# Patient Record
Sex: MALE | Race: WHITE | NOT HISPANIC OR LATINO
[De-identification: names, ages, dates, MRNs, and addresses within clinical notes are randomized per-mention and may not be internally consistent; named-entity substitution may affect disease eponyms.]

---

## 2017-07-27 ENCOUNTER — APPOINTMENT (OUTPATIENT)
Dept: GASTROENTEROLOGY | Facility: CLINIC | Age: 82
End: 2017-07-27
Payer: COMMERCIAL

## 2017-07-27 VITALS
BODY MASS INDEX: 34.53 KG/M2 | TEMPERATURE: 97.7 F | DIASTOLIC BLOOD PRESSURE: 70 MMHG | WEIGHT: 220 LBS | HEIGHT: 67 IN | SYSTOLIC BLOOD PRESSURE: 120 MMHG

## 2017-07-27 DIAGNOSIS — Z86.79 PERSONAL HISTORY OF OTHER DISEASES OF THE CIRCULATORY SYSTEM: ICD-10-CM

## 2017-07-27 DIAGNOSIS — Z23 ENCOUNTER FOR IMMUNIZATION: ICD-10-CM

## 2017-07-27 DIAGNOSIS — F15.90 OTHER STIMULANT USE, UNSPECIFIED, UNCOMPLICATED: ICD-10-CM

## 2017-07-27 PROCEDURE — 99204 OFFICE O/P NEW MOD 45 MIN: CPT

## 2017-08-08 ENCOUNTER — APPOINTMENT (OUTPATIENT)
Dept: GASTROENTEROLOGY | Facility: HOSPITAL | Age: 82
End: 2017-08-08
Payer: COMMERCIAL

## 2017-08-08 ENCOUNTER — RESULT REVIEW (OUTPATIENT)
Age: 82
End: 2017-08-08

## 2017-08-08 ENCOUNTER — OUTPATIENT (OUTPATIENT)
Dept: OUTPATIENT SERVICES | Facility: HOSPITAL | Age: 82
LOS: 1 days | Discharge: HOME | End: 2017-08-08
Payer: COMMERCIAL

## 2017-08-08 VITALS
TEMPERATURE: 97 F | SYSTOLIC BLOOD PRESSURE: 189 MMHG | DIASTOLIC BLOOD PRESSURE: 84 MMHG | RESPIRATION RATE: 17 BRPM | OXYGEN SATURATION: 97 % | HEART RATE: 77 BPM | HEIGHT: 67.5 IN | WEIGHT: 220.02 LBS

## 2017-08-08 VITALS
SYSTOLIC BLOOD PRESSURE: 152 MMHG | DIASTOLIC BLOOD PRESSURE: 76 MMHG | OXYGEN SATURATION: 100 % | HEART RATE: 62 BPM | RESPIRATION RATE: 15 BRPM

## 2017-08-08 DIAGNOSIS — I71.4 ABDOMINAL AORTIC ANEURYSM, WITHOUT RUPTURE: Chronic | ICD-10-CM

## 2017-08-08 DIAGNOSIS — Z90.79 ACQUIRED ABSENCE OF OTHER GENITAL ORGAN(S): Chronic | ICD-10-CM

## 2017-08-08 PROCEDURE — 88305 TISSUE EXAM BY PATHOLOGIST: CPT | Mod: 26

## 2017-08-08 PROCEDURE — 88312 SPECIAL STAINS GROUP 1: CPT | Mod: 26

## 2017-08-08 PROCEDURE — G0121 COLON CA SCRN NOT HI RSK IND: CPT | Mod: 53

## 2017-08-08 PROCEDURE — 43239 EGD BIOPSY SINGLE/MULTIPLE: CPT | Mod: 59

## 2017-08-08 PROCEDURE — G0105: CPT | Mod: 53

## 2017-08-08 RX ORDER — SODIUM CHLORIDE 9 MG/ML
1000 INJECTION, SOLUTION INTRAVENOUS
Qty: 0 | Refills: 0 | Status: DISCONTINUED | OUTPATIENT
Start: 2017-08-08 | End: 2017-08-08

## 2017-08-08 RX ADMIN — SODIUM CHLORIDE 75 MILLILITER(S): 9 INJECTION, SOLUTION INTRAVENOUS at 15:45

## 2017-08-08 NOTE — ASU DISCHARGE PLAN (ADULT/PEDIATRIC). - NOTIFY
Persistent Nausea and Vomiting/Inability to Tolerate Liquids or Foods/Fever greater than 101/Bleeding that does not stop

## 2017-08-10 LAB — SURGICAL PATHOLOGY FINAL REPORT - CH: SIGNIFICANT CHANGE UP

## 2017-08-11 DIAGNOSIS — K29.70 GASTRITIS, UNSPECIFIED, WITHOUT BLEEDING: ICD-10-CM

## 2017-08-11 DIAGNOSIS — R63.4 ABNORMAL WEIGHT LOSS: ICD-10-CM

## 2017-08-11 DIAGNOSIS — K29.80 DUODENITIS WITHOUT BLEEDING: ICD-10-CM

## 2017-08-30 ENCOUNTER — APPOINTMENT (OUTPATIENT)
Dept: GASTROENTEROLOGY | Facility: CLINIC | Age: 82
End: 2017-08-30
Payer: COMMERCIAL

## 2017-08-30 VITALS
SYSTOLIC BLOOD PRESSURE: 160 MMHG | HEIGHT: 67 IN | BODY MASS INDEX: 33.43 KG/M2 | DIASTOLIC BLOOD PRESSURE: 85 MMHG | TEMPERATURE: 97.5 F | WEIGHT: 213 LBS

## 2017-08-30 PROCEDURE — 99214 OFFICE O/P EST MOD 30 MIN: CPT

## 2017-11-06 ENCOUNTER — APPOINTMENT (OUTPATIENT)
Dept: GASTROENTEROLOGY | Facility: AMBULATORY MEDICAL SERVICES | Age: 82
End: 2017-11-06

## 2018-01-27 ENCOUNTER — INPATIENT (INPATIENT)
Facility: HOSPITAL | Age: 83
LOS: 11 days | Discharge: NURSING, REHAB OR LONG-TERM CARE | DRG: 235 | End: 2018-02-08
Attending: THORACIC SURGERY (CARDIOTHORACIC VASCULAR SURGERY) | Admitting: THORACIC SURGERY (CARDIOTHORACIC VASCULAR SURGERY)
Payer: COMMERCIAL

## 2018-01-27 VITALS — WEIGHT: 192.24 LBS | TEMPERATURE: 99 F

## 2018-01-27 DIAGNOSIS — Z98.890 OTHER SPECIFIED POSTPROCEDURAL STATES: Chronic | ICD-10-CM

## 2018-01-27 DIAGNOSIS — Z98.49 CATARACT EXTRACTION STATUS, UNSPECIFIED EYE: Chronic | ICD-10-CM

## 2018-01-27 DIAGNOSIS — Z90.79 ACQUIRED ABSENCE OF OTHER GENITAL ORGAN(S): Chronic | ICD-10-CM

## 2018-01-27 DIAGNOSIS — I71.4 ABDOMINAL AORTIC ANEURYSM, WITHOUT RUPTURE: Chronic | ICD-10-CM

## 2018-01-27 LAB
GLUCOSE BLDC GLUCOMTR-MCNC: 98 MG/DL — SIGNIFICANT CHANGE UP (ref 70–99)
HCT VFR BLD CALC: 44.1 % — SIGNIFICANT CHANGE UP (ref 39–50)
HGB BLD-MCNC: 14.6 G/DL — SIGNIFICANT CHANGE UP (ref 13–17)
MCHC RBC-ENTMCNC: 29 PG — SIGNIFICANT CHANGE UP (ref 27–34)
MCHC RBC-ENTMCNC: 33.1 G/DL — SIGNIFICANT CHANGE UP (ref 32–36)
MCV RBC AUTO: 87.7 FL — SIGNIFICANT CHANGE UP (ref 80–100)
PLATELET # BLD AUTO: 139 K/UL — LOW (ref 150–400)
RBC # BLD: 5.03 M/UL — SIGNIFICANT CHANGE UP (ref 4.2–5.8)
RBC # FLD: 14.5 % — SIGNIFICANT CHANGE UP (ref 10.3–16.9)
WBC # BLD: 11 K/UL — HIGH (ref 3.8–10.5)
WBC # FLD AUTO: 11 K/UL — HIGH (ref 3.8–10.5)

## 2018-01-27 PROCEDURE — 71045 X-RAY EXAM CHEST 1 VIEW: CPT | Mod: 26

## 2018-01-27 PROCEDURE — 99291 CRITICAL CARE FIRST HOUR: CPT

## 2018-01-27 PROCEDURE — 99292 CRITICAL CARE ADDL 30 MIN: CPT

## 2018-01-27 NOTE — H&P ADULT - PMH
Benign prostate hyperplasia    Hypercholesterolemia    Hypertension Benign prostate hyperplasia    Hemorrhoids    Hypercholesterolemia    Hypertension

## 2018-01-27 NOTE — H&P ADULT - NSHPREVIEWOFSYSTEMS_GEN_ALL_CORE
Review of Systems  CONSTITUTIONAL:  Denies Fevers / chills, sweats, fatigue, weight loss, weight gain                                      NEURO:  Denies parathesias, seizures, syncope, confusion                                                                                EYES:  Denies Blurry vision, discharge, pain, loss of vision                                                                                    ENMT:  Denies Difficulty hearing, vertigo, dysphagia, epistaxis, recent dental work                                       CV:  Denies Chest pain, palpitations, CHIANG, orthopnea                                                                                          RESPIRATORY:  Denies Wheezing, SOB, cough / sputum, hemoptysis                                                                GI:  Denies Nausea, vomiting, diarrhea, constipation, melena, difficulty swallowing                                               : Denies Hematuria, dysuria, urgency, incontinence                                                                                         MUSKULOSKELETAL:  Denies arthritis, joint swelling, muscle weakness                                                             SKIN/BREAST:  Denies rash, itching, hair loss, masses                                                                                            PSYCH:  Denies depression, anxiety, suicidal ideation                                                                                               HEME/LYMPH:  Denies bruises easily, enlarged lymph nodes, tender lymph nodes                                        ENDOCRINE:  Denies cold intolerance, heat intolerance, polydipsia Review of Systems  CONSTITUTIONAL:  Denies Fevers / chills, sweats, fatigue, weight loss, weight gain                                      NEURO:  Denies parathesias, seizures, syncope, confusion                                                                                EYES:  Denies Blurry vision, discharge, pain, loss of vision                                                                                    ENMT:  Denies Difficulty hearing, vertigo, dysphagia, epistaxis, recent dental work                                       CV:  Denies Chest pain, palpitations, CHIANG, orthopnea                                                                                          RESPIRATORY:  Denies Wheezing, SOB, cough / sputum, hemoptysis                                                                GI:  Denies Nausea, vomiting, diarrhea, melena, difficulty swallowing                admit constipation,                                              : Denies Hematuria, dysuria, urgency, incontinence                                                                                         MUSKULOSKELETAL:  Denies arthritis, joint swelling, muscle weakness                                                             SKIN/BREAST:  Denies rash, itching, hair loss, masses                                                                                            PSYCH:  Denies depression, anxiety, suicidal ideation                                                                                               HEME/LYMPH:  Denies bruises easily, enlarged lymph nodes, tender lymph nodes                                        ENDOCRINE:  Denies cold intolerance, heat intolerance, polydipsia Review of Systems  CONSTITUTIONAL:  Denies Fevers / chills, sweats, fatigue, weight loss, weight gain                                      NEURO:  Denies parathesias, seizures, syncope, confusion                                                                                EYES:  Denies Blurry vision, discharge, pain, loss of vision                                                                                    ENMT:  Denies Difficulty hearing, vertigo, dysphagia, epistaxis, recent dental work                                       CV:  Denies Chest pain, palpitations, CHIANG, orthopnea                                                                                          RESPIRATORY:  Denies Wheezing, SOB, cough / sputum, hemoptysis                                                                GI:  Denies Nausea, vomiting, diarrhea, melena, difficulty swallowing                Admit constipation,                                              : Denies Hematuria, dysuria, urgency, incontinence                                                                                         MUSKULOSKELETAL:  Denies arthritis, joint swelling, muscle weakness                                                             SKIN/BREAST:  Denies rash, itching, hair loss, masses                                                                                            PSYCH:  Denies depression, anxiety, suicidal ideation                                                                                               HEME/LYMPH:  Denies bruises easily, enlarged lymph nodes, tender lymph nodes                                        ENDOCRINE:  Denies cold intolerance, heat intolerance, polydipsia Review of Systems  CONSTITUTIONAL:  Denies Fevers / chills, sweats, fatigue, weight loss, weight gain                                      NEURO:  Denies parathesias, seizures, syncope, confusion                                                                                EYES:  Denies Blurry vision, discharge, pain, loss of vision                                                                                    ENMT:  Denies Difficulty hearing, vertigo, dysphagia, epistaxis, recent dental work                                       CV:  Denies Chest pain, palpitations, CHIANG, orthopnea                                                                                          RESPIRATORY:  Denies Wheezing, SOB, cough / sputum, hemoptysis                                                                GI:  Denies Nausea, vomiting, diarrhea, melena, difficulty swallowing                Admit constipation, BRBPR possibly 2/2 hemorrhoids                                             : Denies Hematuria, dysuria, urgency, incontinence                                                                                         MUSKULOSKELETAL:  Denies arthritis, joint swelling, muscle weakness                                                             SKIN/BREAST:  Denies rash, itching, hair loss, masses                                                                                            PSYCH:  Denies depression, anxiety, suicidal ideation                                                                                               HEME/LYMPH:  Denies bruises easily, enlarged lymph nodes, tender lymph nodes                                        ENDOCRINE:  Denies cold intolerance, heat intolerance, polydipsia

## 2018-01-27 NOTE — H&P ADULT - NSHPSOCIALHISTORY_GEN_ALL_CORE
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

## 2018-01-27 NOTE — H&P ADULT - NSHPLABSRESULTS_GEN_ALL_CORE
Cardiac Cath done showed pLAD 80% stenosis, mCirc diffuse 80% stenosis, OM1 99% in the third proximal segment, pRCA 100% stenosis, EF 50% apical hypokinesis, apical septal hypokinesis.

## 2018-01-27 NOTE — PROGRESS NOTE ADULT - SUBJECTIVE AND OBJECTIVE BOX
CTICU  CRITICAL  CARE  attending     Hand off received 					   Pertinent clinical, laboratory, radiographic, hemodynamic, echocardiographic, respiratory data, microbiologic data and chart were reviewed and analyzed frequently throughout the course of the day and night  Patient seen and examined with CTS/ SH attending at bedside  Pt is a 85y , Male, HEALTH ISSUES - PROBLEM Dx:      , FAMILY HISTORY:  PAST MEDICAL & SURGICAL HISTORY:  Hypercholesterolemia  Benign prostate hyperplasia  Hypertension  H/O transurethral resection of prostate  AAA (abdominal aortic aneurysm)    Patient is a 85y old  Male who presents with a chief complaint of 3 vessel CAD (27 Jan 2018 23:05)      14 system review was unremarkable  acute changes include acute respiratory failure  Vital signs, hemodynamic and respiratory parameters were reviewed from the bedside nursing flowsheet.  ICU Vital Signs Last 24 Hrs  T(C): 37.3 (27 Jan 2018 22:35), Max: 37.3 (27 Jan 2018 22:35)  T(F): 99.2 (27 Jan 2018 22:35), Max: 99.2 (27 Jan 2018 22:35)  HR: --  BP: --  BP(mean): --  ABP: --  ABP(mean): --  RR: --  SpO2: --    Adult Advanced Hemodynamics Last 24 Hrs  CVP(mm Hg): --  CVP(cm H2O): --  CO: --  CI: --  PA: --  PA(mean): --  PCWP: --  SVR: --  SVRI: --  PVR: --  PVRI: --,     Intake and output was reviewed and the fluid balance was calculated  Daily     Daily   I&O's Summary      All lines and drain sites were assessed  [facility]      POCT Blood Glucose.: 98 mg/dL (27 Jan 2018 22:57)    No acute change in mental status  Auscultation of the chest reveals equal bs  Abdomen is soft  Extremities are warm and well perfused  Wounds appear clean and unremarkable  Antibiotics are periop    labs            The current medications were reviewed   MEDICATIONS  (STANDING):    MEDICATIONS  (PRN):       PROBLEM LIST/ ASSESSMENT:  HEALTH ISSUES - PROBLEM Dx:      ,   Patient is a 85y old  Male who presents with a chief complaint of 3 vessel CAD (27 Jan 2018 23:05)     s/p   acute changes include acute respiratory failure    My plan includes :  close hemodynamic, ventilatory and drain monitoring and management per post op routine    Monitor for arrhythmias and monitor parameters for organ perfusion  monitor neurologic status  Head of the bed should remain elevated to 45 deg .   chest PT and IS will be encouraged  monitor adequacy of oxygenation and ventilation and attempt to wean oxygen  Nutritional goals will be met using po eventually , ensure adequate caloric intake and montior the same  Stress ulcer and VTE prophylaxis will be achieved    Glycemic control is satisfactory  Electrolytes have been repleted as necessary and wound care has been carried out. Pain control has been achieved.   agressive physical therapy and early mobility and ambulation goals will be met   The family was updated about the course and plan  CRITICAL CARE TIME SPENT in evaluation and management, reassessments, review and interpretation of labs and x-rays, ventilator and hemodynamic management, formulating a plan and coordinating care: ___90____ MIN.  Time does not include procedural time.    CTICU ATTENDING     					    Enoch Sharif MD CTICU  CRITICAL  CARE  attending     Hand off received 					   Pertinent clinical, laboratory, radiographic, hemodynamic, echocardiographic, respiratory data, microbiologic data and chart were reviewed and analyzed frequently throughout the course of the day and night  Patient seen and examined with CTS/ SH attending at bedside  Pt is a 85y , Male, admitted with NSTEMI; triple vessel disease     a poor historian with hypertension, hypercholesterolemia, BPH, s/p AAA repair,  BIBA to [facility] after feeling lightheaded and fell at home on Friday night. Lab work revealed troponin 23, no acute changes on EKG seen, patient was subsequently loaded with aspirin and plavix. Cardiac Cath done showed pLAD 80% stenosis, mCirc diffuse 80% stenosis, OM1 99% in the third proximal segment, pRCA 100% stenosis, EF 50% apical hypokinesis, apical septal hypokinesis.   Patient denies, syncope, chest pain, SOB, Orthopnea, palpitation, nausea, vomiting.  Blood culture sent was positive for gram negative rods. Patient was given a dose of Zosyn 3.375mg today before his transfer to [facility] ICU on heparin infusion , for evaluation for CABG.            , FAMILY HISTORY:  PAST MEDICAL & SURGICAL HISTORY:  Hypercholesterolemia  Benign prostate hyperplasia  Hypertension  H/O transurethral resection of prostate  AAA (abdominal aortic aneurysm)    Patient is a 85y old  Male who presents with a chief complaint of 3 vessel CAD (27 Jan 2018 23:05)      14 system review was unremarkable  acute changes include acute respiratory failure  Vital signs, hemodynamic and respiratory parameters were reviewed from the bedside nursing flowsheet.  ICU Vital Signs Last 24 Hrs  T(C): 37.3 (27 Jan 2018 22:35), Max: 37.3 (27 Jan 2018 22:35)  T(F): 99.2 (27 Jan 2018 22:35), Max: 99.2 (27 Jan 2018 22:35)  HR: --  BP: --  BP(mean): --  ABP: --  ABP(mean): --  RR: --  SpO2: --    Adult Advanced Hemodynamics Last 24 Hrs  CVP(mm Hg): --  CVP(cm H2O): --  CO: --  CI: --  PA: --  PA(mean): --  PCWP: --  SVR: --  SVRI: --  PVR: --  PVRI: --,     Intake and output was reviewed and the fluid balance was calculated  Daily     Daily   I&O's Summary      All lines and drain sites were assessed  Glycemic trend was [facility] BLOOD GLUCOSE      POCT Blood Glucose.: 98 mg/dL (27 Jan 2018 22:57)    No acute change in mental status  Auscultation of the chest reveals equal bs  Abdomen is soft  Extremities are warm and well perfused  Wounds appear clean and unremarkable  Antibiotics are periop    labs            The current medications were reviewed   MEDICATIONS  (STANDING):    MEDICATIONS  (PRN):       PROBLEM LIST/ ASSESSMENT:  HEALTH ISSUES - PROBLEM Dx:    NSTEMI  Unstable angina  CAD      ,   Patient is a 85y old  Male who presents with a chief complaint of 3 vessel CAD /NSTEMI      My plan includes :  close hemodynamic, ventilatory and drain monitoring and management per post op routine  Monitor for arrhythmias and monitor parameters for organ perfusion  monitor neurologic status  Head of the bed should remain elevated to 45 deg .   chest PT and IS will be encouraged  monitor adequacy of oxygenation and ventilation and attempt to wean oxygen  Nutritional goals will be met using po eventually , ensure adequate caloric intake and montior the same  Stress ulcer and VTE prophylaxis will be achieved    Glycemic control is satisfactory  Electrolytes have been repleted as necessary and wound care has been carried out. Pain control has been achieved.   agressive physical therapy and early mobility and ambulation goals will be met   The family was updated about the course and plan  CRITICAL CARE TIME SPENT in evaluation and management, reassessments, review and interpretation of labs and x-rays, ventilator and hemodynamic management, formulating a plan and coordinating care: ___90____ MIN.  Time does not include procedural time.    CTICU ATTENDING     					    Enoch Sharif MD

## 2018-01-27 NOTE — H&P ADULT - ASSESSMENT
This is an 85 year old male, a poor historian with hypertension, hypercholesterolemia, BPH, s/p AAA repair,  BIBA to [facility] after feeling lightheaded and fell at home on Friday night. Lab work revealed troponin 23, no acute changes on EKG seen, patient was subsequently loaded with aspirin and plavix. Cardiac Cath done showed pLAD 80% stenosis, mCirc diffuse 80% stenosis, OM1 99% in the third proximal segment, pRCA 100% stenosis, EF 50% apical hypokinesis, apical septal hypokinesis.   Patient denies, syncope, chest pain, SOB, Orthopnea, palpitation, nausea, vomiting.  Blood culture sent was positive for gram negative rods. Patient was given a dose of Zosyn 3.375mg today before his transfer to [facility] ICU on heparin infusion under Dr. Gilman for evaluation for CABG.    Plan:    Neurovascular: No delirium. No chest pain    Cardiovascular: Hemodynamically stable. HR controlled.  -BP. HR. EKG. TTE. Cardiac Panel. Lipid Panel. BNP.   -ASA. BBlocker. Statin.    -D/c heparin gtt per Dr. Gilman    Respiratory: 02 Sat = 98% on RA.  -If on oxygen wean to RA from for O2 Sat > 93%.  -Encourage C+DB and Use of IS 10x / hr while awake.  -CXR.  -PFT    GI: Stable.  -PPX.  -PO Diet.    Renal / :  -Monitor renal function. CARLOS creatine increase 1.5 to 1.7  -Monitor I/O's.    Endocrine:    -A1c.  -TSH.    Hematologic:  -CBC.  -Coagulation Panel.    ID:  -Temperature.  -CBC.  -blood culture x2 sets  -f/u positive blood culture for sensitivities at [facility]  -continue Zosyn 3.375mg q6h    Prophylaxis:  -DVT prophylaxis with 5000 SubQ Heparin q8h.  -SCD's    Disposition:  -ICU for frequent monitoring.

## 2018-01-27 NOTE — H&P ADULT - NSHPPHYSICALEXAM_GEN_ALL_CORE
Physical Exam  CONSTITUTIONAL:                                                              WNL  NEURO:                                                                       WNL                      EYES:                                                                                WNL  ENMT:                                                                               WNL  CV:                                                                                   WNL  RESPIRATORY:                                                                 WNL  GI:                                                                                     WNL  : LANE + / -                                                                  WNL  MUSKULOSKELETAL:                                                       WNL  SKIN / BREAST:                                                                  WNL  EXTREMITIES:                                                                     WNL  VASCULAR:                                                                        WNL Physical Exam    CONSTITUTIONAL: Patient seen bdside in [facility]                                                      NEURO:   Alert and Oriented. No gross deficits appreciated                      EYES:       PERRL,                                                            ENMT:     Supple, no lymphadenopathy  CV:     RRR, S1S2, no murmur, no gallop  RESPIRATORY: CTA bilateral, no wheeze, no rhonchi, no rales  GI:   soft, nontender, positive bowel sound  : LANE none  MUSKULOSKELETAL:   full ROM  SKIN / BREAST:  bilateral shin with healing scratch marks, no decubutus ulcer appreciated  EXTREMITIES: no edema, bilateral LE with hyperpigmentation 2/2 to vascular changes  VASCULAR:  no bruit bilateral carotids, all pulses 2+  bilateral (radial/femoral/PT/DP)

## 2018-01-27 NOTE — H&P ADULT - HISTORY OF PRESENT ILLNESS
This is an 85 year old male, a poor historian with hypertension, hypercholesterolemia, BPH, s/p AAA repair,  BIBA to [facility] after feeling lightheaded and fell at home on Friday night. Lab work revealed troponin 23, no acute changes on EKG seen, patient was subsequently loaded with aspirin and plavix. Cardiac Cath done showed pLAD 80% stenosis, mCirc diffuse 80% stenosis, OM1 99% in the third proximal segment, pRCA 100% stenosis, EF 50% apical hypokinesis, apical septal hypokinesis.   Patient denies, syncope, chest pain, SOB, Orthopnea, palpitation, nausea, vomiting.  Blood culture sent was positive for gram negative rods. Patient was given a dose of Zosyn 3.375mg today before his transfer to [facility] ICU on heparin infusion under Dr. Gilman for evaluation for CABG. This is an 85 year old male, a poor historian with hypertension, hypercholesterolemia, BPH, s/p AAA repair,  BIBA to [facility] after feeling lightheaded and fell at home on Friday night. Lab work revealed troponin 23, no acute changes on EKG seen, NSTEMI, patient was subsequently loaded with aspirin and plavix. Cardiac Cath done showed pLAD 80% stenosis, mCirc diffuse 80% stenosis, OM1 99% in the third proximal segment, pRCA 100% stenosis, EF 50% apical hypokinesis, apical septal hypokinesis.   Patient denies, syncope, chest pain, SOB, Orthopnea, palpitation, nausea, vomiting.  Blood culture sent was positive for gram negative rods. Patient was given a dose of Zosyn 3.375mg today before his transfer to [facility] ICU on heparin infusion under Dr. Gilman for evaluation for CABG.

## 2018-01-28 LAB
ALBUMIN SERPL ELPH-MCNC: 3.4 G/DL — SIGNIFICANT CHANGE UP (ref 3.3–5)
ALBUMIN SERPL ELPH-MCNC: 3.6 G/DL — SIGNIFICANT CHANGE UP (ref 3.3–5)
ALBUMIN SERPL ELPH-MCNC: 3.8 G/DL — SIGNIFICANT CHANGE UP (ref 3.3–5)
ALP SERPL-CCNC: 63 U/L — SIGNIFICANT CHANGE UP (ref 40–120)
ALP SERPL-CCNC: 74 U/L — SIGNIFICANT CHANGE UP (ref 40–120)
ALP SERPL-CCNC: 75 U/L — SIGNIFICANT CHANGE UP (ref 40–120)
ALT FLD-CCNC: 32 U/L — SIGNIFICANT CHANGE UP (ref 10–45)
ALT FLD-CCNC: 33 U/L — SIGNIFICANT CHANGE UP (ref 10–45)
ALT FLD-CCNC: 36 U/L — SIGNIFICANT CHANGE UP (ref 10–45)
ANION GAP SERPL CALC-SCNC: 13 MMOL/L — SIGNIFICANT CHANGE UP (ref 5–17)
ANION GAP SERPL CALC-SCNC: 14 MMOL/L — SIGNIFICANT CHANGE UP (ref 5–17)
ANION GAP SERPL CALC-SCNC: 16 MMOL/L — SIGNIFICANT CHANGE UP (ref 5–17)
APPEARANCE UR: CLEAR — SIGNIFICANT CHANGE UP
APTT BLD: 28.1 SEC — SIGNIFICANT CHANGE UP (ref 27.5–37.4)
APTT BLD: 31.4 SEC — SIGNIFICANT CHANGE UP (ref 27.5–37.4)
APTT BLD: 53.9 SEC — HIGH (ref 27.5–37.4)
AST SERPL-CCNC: 120 U/L — HIGH (ref 10–40)
AST SERPL-CCNC: 91 U/L — HIGH (ref 10–40)
AST SERPL-CCNC: 97 U/L — HIGH (ref 10–40)
BACTERIA # UR AUTO: PRESENT /HPF
BILIRUB DIRECT SERPL-MCNC: 0.4 MG/DL — HIGH (ref 0–0.2)
BILIRUB DIRECT SERPL-MCNC: 0.4 MG/DL — HIGH (ref 0–0.2)
BILIRUB INDIRECT FLD-MCNC: 1 MG/DL — SIGNIFICANT CHANGE UP (ref 0.2–1)
BILIRUB SERPL-MCNC: 1.4 MG/DL — HIGH (ref 0.2–1.2)
BILIRUB SERPL-MCNC: 1.5 MG/DL — HIGH (ref 0.2–1.2)
BILIRUB SERPL-MCNC: 1.7 MG/DL — HIGH (ref 0.2–1.2)
BILIRUB UR-MCNC: NEGATIVE — SIGNIFICANT CHANGE UP
BLD GP AB SCN SERPL QL: NEGATIVE — SIGNIFICANT CHANGE UP
BUN SERPL-MCNC: 35 MG/DL — HIGH (ref 7–23)
BUN SERPL-MCNC: 36 MG/DL — HIGH (ref 7–23)
BUN SERPL-MCNC: 38 MG/DL — HIGH (ref 7–23)
CALCIUM SERPL-MCNC: 8.8 MG/DL — SIGNIFICANT CHANGE UP (ref 8.4–10.5)
CALCIUM SERPL-MCNC: 9.5 MG/DL — SIGNIFICANT CHANGE UP (ref 8.4–10.5)
CALCIUM SERPL-MCNC: 9.9 MG/DL — SIGNIFICANT CHANGE UP (ref 8.4–10.5)
CHLORIDE SERPL-SCNC: 100 MMOL/L — SIGNIFICANT CHANGE UP (ref 96–108)
CHLORIDE SERPL-SCNC: 98 MMOL/L — SIGNIFICANT CHANGE UP (ref 96–108)
CHLORIDE SERPL-SCNC: 99 MMOL/L — SIGNIFICANT CHANGE UP (ref 96–108)
CHOLEST SERPL-MCNC: 110 MG/DL — SIGNIFICANT CHANGE UP (ref 10–199)
CK MB CFR SERPL CALC: 24.4 NG/ML — HIGH (ref 0–6.7)
CK SERPL-CCNC: 384 U/L — HIGH (ref 30–200)
CK SERPL-CCNC: 527 U/L — HIGH (ref 30–200)
CO2 SERPL-SCNC: 25 MMOL/L — SIGNIFICANT CHANGE UP (ref 22–31)
COLOR SPEC: YELLOW — SIGNIFICANT CHANGE UP
COMMENT - URINE: SIGNIFICANT CHANGE UP
CREAT SERPL-MCNC: 1.76 MG/DL — HIGH (ref 0.5–1.3)
CREAT SERPL-MCNC: 1.99 MG/DL — HIGH (ref 0.5–1.3)
CREAT SERPL-MCNC: 2.05 MG/DL — HIGH (ref 0.5–1.3)
DIFF PNL FLD: (no result)
EPI CELLS # UR: SIGNIFICANT CHANGE UP /HPF (ref 0–5)
GLUCOSE BLDC GLUCOMTR-MCNC: 113 MG/DL — HIGH (ref 70–99)
GLUCOSE SERPL-MCNC: 105 MG/DL — HIGH (ref 70–99)
GLUCOSE SERPL-MCNC: 106 MG/DL — HIGH (ref 70–99)
GLUCOSE SERPL-MCNC: 126 MG/DL — HIGH (ref 70–99)
GLUCOSE UR QL: NEGATIVE — SIGNIFICANT CHANGE UP
HBA1C BLD-MCNC: 5 % — SIGNIFICANT CHANGE UP (ref 4–5.6)
HCT VFR BLD CALC: 39.2 % — SIGNIFICANT CHANGE UP (ref 39–50)
HCT VFR BLD CALC: 41.1 % — SIGNIFICANT CHANGE UP (ref 39–50)
HDLC SERPL-MCNC: 41 MG/DL — SIGNIFICANT CHANGE UP (ref 40–125)
HGB BLD-MCNC: 13.3 G/DL — SIGNIFICANT CHANGE UP (ref 13–17)
HGB BLD-MCNC: 14.3 G/DL — SIGNIFICANT CHANGE UP (ref 13–17)
INR BLD: 1.18 — HIGH (ref 0.88–1.16)
INR BLD: 1.38 — HIGH (ref 0.88–1.16)
INR BLD: 1.41 — HIGH (ref 0.88–1.16)
KETONES UR-MCNC: NEGATIVE — SIGNIFICANT CHANGE UP
LEUKOCYTE ESTERASE UR-ACNC: NEGATIVE — SIGNIFICANT CHANGE UP
LIPID PNL WITH DIRECT LDL SERPL: 49 MG/DL — SIGNIFICANT CHANGE UP
MAGNESIUM SERPL-MCNC: 1.8 MG/DL — SIGNIFICANT CHANGE UP (ref 1.6–2.6)
MAGNESIUM SERPL-MCNC: 2 MG/DL — SIGNIFICANT CHANGE UP (ref 1.6–2.6)
MCHC RBC-ENTMCNC: 29.7 PG — SIGNIFICANT CHANGE UP (ref 27–34)
MCHC RBC-ENTMCNC: 30.4 PG — SIGNIFICANT CHANGE UP (ref 27–34)
MCHC RBC-ENTMCNC: 33.9 G/DL — SIGNIFICANT CHANGE UP (ref 32–36)
MCHC RBC-ENTMCNC: 34.8 G/DL — SIGNIFICANT CHANGE UP (ref 32–36)
MCV RBC AUTO: 87.4 FL — SIGNIFICANT CHANGE UP (ref 80–100)
MCV RBC AUTO: 87.5 FL — SIGNIFICANT CHANGE UP (ref 80–100)
NITRITE UR-MCNC: NEGATIVE — SIGNIFICANT CHANGE UP
NT-PROBNP SERPL-SCNC: HIGH PG/ML (ref 0–300)
PH UR: 5.5 — SIGNIFICANT CHANGE UP (ref 5–8)
PHOSPHATE SERPL-MCNC: 2.8 MG/DL — SIGNIFICANT CHANGE UP (ref 2.5–4.5)
PHOSPHATE SERPL-MCNC: 3.2 MG/DL — SIGNIFICANT CHANGE UP (ref 2.5–4.5)
PLATELET # BLD AUTO: 103 K/UL — LOW (ref 150–400)
PLATELET # BLD AUTO: 119 K/UL — LOW (ref 150–400)
POTASSIUM SERPL-MCNC: 3.6 MMOL/L — SIGNIFICANT CHANGE UP (ref 3.5–5.3)
POTASSIUM SERPL-MCNC: 4.1 MMOL/L — SIGNIFICANT CHANGE UP (ref 3.5–5.3)
POTASSIUM SERPL-MCNC: 4.2 MMOL/L — SIGNIFICANT CHANGE UP (ref 3.5–5.3)
POTASSIUM SERPL-SCNC: 3.6 MMOL/L — SIGNIFICANT CHANGE UP (ref 3.5–5.3)
POTASSIUM SERPL-SCNC: 4.1 MMOL/L — SIGNIFICANT CHANGE UP (ref 3.5–5.3)
POTASSIUM SERPL-SCNC: 4.2 MMOL/L — SIGNIFICANT CHANGE UP (ref 3.5–5.3)
PROT SERPL-MCNC: 6.3 G/DL — SIGNIFICANT CHANGE UP (ref 6–8.3)
PROT SERPL-MCNC: 7.2 G/DL — SIGNIFICANT CHANGE UP (ref 6–8.3)
PROT SERPL-MCNC: 7.5 G/DL — SIGNIFICANT CHANGE UP (ref 6–8.3)
PROT UR-MCNC: 100 MG/DL
PROTHROM AB SERPL-ACNC: 13.1 SEC — HIGH (ref 9.8–12.7)
PROTHROM AB SERPL-ACNC: 15.4 SEC — HIGH (ref 9.8–12.7)
PROTHROM AB SERPL-ACNC: 15.8 SEC — HIGH (ref 9.8–12.7)
RBC # BLD: 4.48 M/UL — SIGNIFICANT CHANGE UP (ref 4.2–5.8)
RBC # BLD: 4.7 M/UL — SIGNIFICANT CHANGE UP (ref 4.2–5.8)
RBC # FLD: 14.3 % — SIGNIFICANT CHANGE UP (ref 10.3–16.9)
RBC # FLD: 14.4 % — SIGNIFICANT CHANGE UP (ref 10.3–16.9)
RBC CASTS # UR COMP ASSIST: > 10 /HPF
RH IG SCN BLD-IMP: POSITIVE — SIGNIFICANT CHANGE UP
RH IG SCN BLD-IMP: POSITIVE — SIGNIFICANT CHANGE UP
SODIUM SERPL-SCNC: 138 MMOL/L — SIGNIFICANT CHANGE UP (ref 135–145)
SODIUM SERPL-SCNC: 138 MMOL/L — SIGNIFICANT CHANGE UP (ref 135–145)
SODIUM SERPL-SCNC: 139 MMOL/L — SIGNIFICANT CHANGE UP (ref 135–145)
SP GR SPEC: 1.01 — SIGNIFICANT CHANGE UP (ref 1–1.03)
TOTAL CHOLESTEROL/HDL RATIO MEASUREMENT: 2.7 RATIO — LOW (ref 3.4–9.6)
TRIGL SERPL-MCNC: 100 MG/DL — SIGNIFICANT CHANGE UP (ref 10–149)
TROPONIN T SERPL-MCNC: 3.74 NG/ML — CRITICAL HIGH (ref 0–0.01)
TROPONIN T SERPL-MCNC: 4 NG/ML — CRITICAL HIGH (ref 0–0.01)
TSH SERPL-MCNC: 0.28 UIU/ML — LOW (ref 0.35–4.94)
UROBILINOGEN FLD QL: 1 E.U./DL — SIGNIFICANT CHANGE UP
WBC # BLD: 7.6 K/UL — SIGNIFICANT CHANGE UP (ref 3.8–10.5)
WBC # BLD: 7.7 K/UL — SIGNIFICANT CHANGE UP (ref 3.8–10.5)
WBC # FLD AUTO: 7.6 K/UL — SIGNIFICANT CHANGE UP (ref 3.8–10.5)
WBC # FLD AUTO: 7.7 K/UL — SIGNIFICANT CHANGE UP (ref 3.8–10.5)
WBC UR QL: < 5 /HPF — SIGNIFICANT CHANGE UP

## 2018-01-28 PROCEDURE — 99291 CRITICAL CARE FIRST HOUR: CPT

## 2018-01-28 PROCEDURE — 93880 EXTRACRANIAL BILAT STUDY: CPT | Mod: 26

## 2018-01-28 PROCEDURE — 93010 ELECTROCARDIOGRAM REPORT: CPT | Mod: 76

## 2018-01-28 PROCEDURE — 76705 ECHO EXAM OF ABDOMEN: CPT | Mod: 26

## 2018-01-28 RX ORDER — HALOPERIDOL DECANOATE 100 MG/ML
2.5 INJECTION INTRAMUSCULAR ONCE
Qty: 0 | Refills: 0 | Status: COMPLETED | OUTPATIENT
Start: 2018-01-28 | End: 2018-01-28

## 2018-01-28 RX ORDER — HEPARIN SODIUM 5000 [USP'U]/ML
5000 INJECTION INTRAVENOUS; SUBCUTANEOUS EVERY 8 HOURS
Qty: 0 | Refills: 0 | Status: DISCONTINUED | OUTPATIENT
Start: 2018-01-28 | End: 2018-02-08

## 2018-01-28 RX ORDER — HYDRALAZINE HCL 50 MG
10 TABLET ORAL ONCE
Qty: 0 | Refills: 0 | Status: COMPLETED | OUTPATIENT
Start: 2018-01-28 | End: 2018-01-28

## 2018-01-28 RX ORDER — DEXMEDETOMIDINE HYDROCHLORIDE IN 0.9% SODIUM CHLORIDE 4 UG/ML
0.2 INJECTION INTRAVENOUS
Qty: 200 | Refills: 0 | Status: DISCONTINUED | OUTPATIENT
Start: 2018-01-28 | End: 2018-01-28

## 2018-01-28 RX ORDER — MAGNESIUM OXIDE 400 MG ORAL TABLET 241.3 MG
800 TABLET ORAL ONCE
Qty: 0 | Refills: 0 | Status: COMPLETED | OUTPATIENT
Start: 2018-01-28 | End: 2018-01-28

## 2018-01-28 RX ORDER — DOXAZOSIN MESYLATE 4 MG
4 TABLET ORAL AT BEDTIME
Qty: 0 | Refills: 0 | Status: DISCONTINUED | OUTPATIENT
Start: 2018-01-28 | End: 2018-01-28

## 2018-01-28 RX ORDER — PANTOPRAZOLE SODIUM 20 MG/1
40 TABLET, DELAYED RELEASE ORAL
Qty: 0 | Refills: 0 | Status: DISCONTINUED | OUTPATIENT
Start: 2018-01-28 | End: 2018-02-08

## 2018-01-28 RX ORDER — PIPERACILLIN AND TAZOBACTAM 4; .5 G/20ML; G/20ML
3.38 INJECTION, POWDER, LYOPHILIZED, FOR SOLUTION INTRAVENOUS EVERY 6 HOURS
Qty: 0 | Refills: 0 | Status: DISCONTINUED | OUTPATIENT
Start: 2018-01-28 | End: 2018-02-03

## 2018-01-28 RX ORDER — MULTIVIT-MIN/FERROUS GLUCONATE 9 MG/15 ML
1 LIQUID (ML) ORAL
Qty: 0 | Refills: 0 | COMMUNITY

## 2018-01-28 RX ORDER — ATORVASTATIN CALCIUM 80 MG/1
80 TABLET, FILM COATED ORAL AT BEDTIME
Qty: 0 | Refills: 0 | Status: DISCONTINUED | OUTPATIENT
Start: 2018-01-28 | End: 2018-02-08

## 2018-01-28 RX ORDER — AMLODIPINE BESYLATE 2.5 MG/1
5 TABLET ORAL ONCE
Qty: 0 | Refills: 0 | Status: COMPLETED | OUTPATIENT
Start: 2018-01-28 | End: 2018-01-28

## 2018-01-28 RX ORDER — ASPIRIN/CALCIUM CARB/MAGNESIUM 324 MG
81 TABLET ORAL DAILY
Qty: 0 | Refills: 0 | Status: DISCONTINUED | OUTPATIENT
Start: 2018-01-28 | End: 2018-02-08

## 2018-01-28 RX ORDER — POTASSIUM CHLORIDE 20 MEQ
40 PACKET (EA) ORAL ONCE
Qty: 0 | Refills: 0 | Status: COMPLETED | OUTPATIENT
Start: 2018-01-28 | End: 2018-01-28

## 2018-01-28 RX ORDER — ALPRAZOLAM 0.25 MG
0.5 TABLET ORAL ONCE
Qty: 0 | Refills: 0 | Status: DISCONTINUED | OUTPATIENT
Start: 2018-01-28 | End: 2018-01-28

## 2018-01-28 RX ORDER — METOPROLOL TARTRATE 50 MG
12.5 TABLET ORAL EVERY 12 HOURS
Qty: 0 | Refills: 0 | Status: DISCONTINUED | OUTPATIENT
Start: 2018-01-28 | End: 2018-01-28

## 2018-01-28 RX ADMIN — HEPARIN SODIUM 5000 UNIT(S): 5000 INJECTION INTRAVENOUS; SUBCUTANEOUS at 05:55

## 2018-01-28 RX ADMIN — DEXMEDETOMIDINE HYDROCHLORIDE IN 0.9% SODIUM CHLORIDE 4.36 MICROGRAM(S)/KG/HR: 4 INJECTION INTRAVENOUS at 03:49

## 2018-01-28 RX ADMIN — Medication 12.5 MILLIGRAM(S): at 00:26

## 2018-01-28 RX ADMIN — Medication 81 MILLIGRAM(S): at 11:13

## 2018-01-28 RX ADMIN — PIPERACILLIN AND TAZOBACTAM 200 GRAM(S): 4; .5 INJECTION, POWDER, LYOPHILIZED, FOR SOLUTION INTRAVENOUS at 03:49

## 2018-01-28 RX ADMIN — AMLODIPINE BESYLATE 5 MILLIGRAM(S): 2.5 TABLET ORAL at 23:30

## 2018-01-28 RX ADMIN — MAGNESIUM OXIDE 400 MG ORAL TABLET 800 MILLIGRAM(S): 241.3 TABLET ORAL at 00:58

## 2018-01-28 RX ADMIN — DEXMEDETOMIDINE HYDROCHLORIDE IN 0.9% SODIUM CHLORIDE 4.36 MICROGRAM(S)/KG/HR: 4 INJECTION INTRAVENOUS at 05:57

## 2018-01-28 RX ADMIN — Medication 0.5 MILLIGRAM(S): at 22:33

## 2018-01-28 RX ADMIN — PIPERACILLIN AND TAZOBACTAM 200 GRAM(S): 4; .5 INJECTION, POWDER, LYOPHILIZED, FOR SOLUTION INTRAVENOUS at 11:13

## 2018-01-28 RX ADMIN — HALOPERIDOL DECANOATE 2.5 MILLIGRAM(S): 100 INJECTION INTRAMUSCULAR at 05:56

## 2018-01-28 RX ADMIN — Medication 10 MILLIGRAM(S): at 21:17

## 2018-01-28 RX ADMIN — ATORVASTATIN CALCIUM 80 MILLIGRAM(S): 80 TABLET, FILM COATED ORAL at 21:06

## 2018-01-28 RX ADMIN — PANTOPRAZOLE SODIUM 40 MILLIGRAM(S): 20 TABLET, DELAYED RELEASE ORAL at 11:13

## 2018-01-28 RX ADMIN — PIPERACILLIN AND TAZOBACTAM 200 GRAM(S): 4; .5 INJECTION, POWDER, LYOPHILIZED, FOR SOLUTION INTRAVENOUS at 15:12

## 2018-01-28 RX ADMIN — Medication 10 MILLIGRAM(S): at 22:34

## 2018-01-28 RX ADMIN — ATORVASTATIN CALCIUM 80 MILLIGRAM(S): 80 TABLET, FILM COATED ORAL at 00:25

## 2018-01-28 RX ADMIN — PIPERACILLIN AND TAZOBACTAM 200 GRAM(S): 4; .5 INJECTION, POWDER, LYOPHILIZED, FOR SOLUTION INTRAVENOUS at 21:06

## 2018-01-28 RX ADMIN — Medication 40 MILLIEQUIVALENT(S): at 00:56

## 2018-01-28 RX ADMIN — HEPARIN SODIUM 5000 UNIT(S): 5000 INJECTION INTRAVENOUS; SUBCUTANEOUS at 14:54

## 2018-01-28 RX ADMIN — HEPARIN SODIUM 5000 UNIT(S): 5000 INJECTION INTRAVENOUS; SUBCUTANEOUS at 21:06

## 2018-01-28 NOTE — PROGRESS NOTE ADULT - ASSESSMENT
Problems  1. GNR bacteremia  2. NSTEMI   3. CARLOS     Plan   pt transferred to [facility] for surgical intervention for CAD  He will need management of Bactermia first  will follow cultures from [facility]  workup for source of bacteremia underway.   CARLOS - likely multifactorial - ATN and contrast induced     Critical care time spent 30 min

## 2018-01-28 NOTE — PROGRESS NOTE ADULT - SUBJECTIVE AND OBJECTIVE BOX
PMH :  TRIPLE VESSEL DISEASE  NSTEMI  No h/o HF  Unknown h/o HF  Family history of heart disease (Father)  No pertinent family history in first degree relatives  Handoff  Hemorrhoids  Hypercholesterolemia  Benign prostate hyperplasia  Hypertension  History of tonsillectomy  Status post cataract extraction  H/O transurethral resection of prostate  AAA (abdominal aortic aneurysm)    ROS  All other systems reviewed and negative.    ICU Vital Signs Last 24 Hrs  T(C): 36.9 (01-28-18 @ 17:03), Max: 37.3 (01-27-18 @ 22:35)  T(F): 98.5 (01-28-18 @ 17:03), Max: 99.2 (01-27-18 @ 22:35)  HR: 62 (01-28-18 @ 17:00) (52 - 74)  BP: 145/68 (01-28-18 @ 17:00) (77/45 - 156/61)  BP(mean): 97 (01-28-18 @ 17:00) (55 - 112)  ABP: --  ABP(mean): --  RR: 23 (01-28-18 @ 17:00) (20 - 29)  SpO2: 99% (01-28-18 @ 17:00) (84% - 99%)    I&O's Summary    27 Jan 2018 07:01  -  28 Jan 2018 07:00  --------------------------------------------------------  IN: 250.6 mL / OUT: 250 mL / NET: 0.6 mL    28 Jan 2018 07:01  -  28 Jan 2018 18:18  --------------------------------------------------------  IN: 1000 mL / OUT: 370 mL / NET: 630 mL                                14.3   7.7   )-----------( 119      ( 28 Jan 2018 14:08 )             41.1     28 Jan 2018 14:08    138    |  99     |  38     ----------------------------<  106    4.2     |  25     |  2.05     Ca    9.5        28 Jan 2018 14:08  Phos  3.2       28 Jan 2018 06:04  Mg     1.8       28 Jan 2018 06:04    TPro  7.2    /  Alb  3.6    /  TBili  1.4    /  DBili  0.4    /  AST  97     /  ALT  36     /  AlkPhos  75     28 Jan 2018 14:08    PT/INR - ( 28 Jan 2018 14:08 )   PT: 13.1 sec;   INR: 1.18          PTT - ( 28 Jan 2018 14:08 )  PTT:31.4 sec  MEDICATIONS  (STANDING):  aspirin enteric coated 81 milliGRAM(s) Oral daily  atorvastatin 80 milliGRAM(s) Oral at bedtime  heparin  Injectable 5000 Unit(s) SubCutaneous every 8 hours  pantoprazole    Tablet 40 milliGRAM(s) Oral before breakfast  piperacillin/tazobactam IVPB. 3.375 Gram(s) IV Intermittent every 6 hours    Home Medications:  Cardura 4 mg oral tablet: 1 tab(s) orally once a day (28 Jan 2018 00:09)  Centrum Silver Men&#x27;s: 1    (28 Jan 2018 00:09)  simvastatin 80 mg oral tablet: 1 tab(s) orally once a day (at bedtime) (28 Jan 2018 00:09)  verapamil 240 mg/24 hours oral capsule, extended release: 1 cap(s) orally once a day (28 Jan 2018 00:09)    PHYSICAL EXAM:  Gen : no acute distress  Neck: No LAD, No JVD  Respiratory: decreased in the bases  Cardiovascular: S1 and S2, RRR, no M/G/R  Gastrointestinal: BS+, soft, NT/ND  Extremities: No peripheral edema  Vascular: 2+ peripheral pulses  Neurological: A/O x 3, no focal deficits  Incision: clean dry/ no sign of infection  Lines: no sign of infection Transferred for [facility] for management of NSTEMI/3 vessel CAD  84yo admitted on 1/27 with weakness/gait imbalance w/u at [facility] + for Trop 35 pt s/p C   infectious w/u overnight now showing gram negative hector bacteremia  Pt confused overnight s/p enhanced supervision.  Mentation this AM normal      PMH :  TRIPLE VESSEL DISEASE  NSTEMI  Hemorrhoids  Hypercholesterolemia  Benign prostate hyperplasia  Hypertension  H/O transurethral resection of prostate  AAA (abdominal aortic aneurysm)    ROS no complaint   All other systems reviewed and negative.    ICU Vital Signs Last 24 Hrs  T(C): 36.9 (01-28-18 @ 17:03), Max: 37.3 (01-27-18 @ 22:35)  T(F): 98.5 (01-28-18 @ 17:03), Max: 99.2 (01-27-18 @ 22:35)  HR: 62 (01-28-18 @ 17:00) (52 - 74)  BP: 145/68 (01-28-18 @ 17:00) (77/45 - 156/61)  BP(mean): 97 (01-28-18 @ 17:00) (55 - 112)  RR: 23 (01-28-18 @ 17:00) (20 - 29)  SpO2: 99% (01-28-18 @ 17:00) (84% - 99%)    I&O's Summary    27 Jan 2018 07:01  -  28 Jan 2018 07:00  --------------------------------------------------------  IN: 250.6 mL / OUT: 250 mL / NET: 0.6 mL    28 Jan 2018 07:01  -  28 Jan 2018 18:18  --------------------------------------------------------  IN: 1000 mL / OUT: 370 mL / NET: 630 mL                        14.3   7.7   )-----------( 119      ( 28 Jan 2018 14:08 )             41.1     28 Jan 2018 14:08    138    |  99     |  38     ----------------------------<  106    4.2     |  25     |  2.05     Ca    9.5        28 Jan 2018 14:08  Phos  3.2       28 Jan 2018 06:04  Mg     1.8       28 Jan 2018 06:04    TPro  7.2    /  Alb  3.6    /  TBili  1.4    /  DBili  0.4    /  AST  97     /  ALT  36     /  AlkPhos  75     28 Jan 2018 14:08    PT/INR - ( 28 Jan 2018 14:08 )   PT: 13.1 sec;   INR: 1.18      PTT - ( 28 Jan 2018 14:08 )  PTT:31.4 sec    MEDICATIONS  (STANDING):  aspirin enteric coated 81 milliGRAM(s) Oral daily  atorvastatin 80 milliGRAM(s) Oral at bedtime  heparin  Injectable 5000 Unit(s) SubCutaneous every 8 hours  pantoprazole    Tablet 40 milliGRAM(s) Oral before breakfast  piperacillin/tazobactam IVPB. 3.375 Gram(s) IV Intermittent every 6 hours    Home Medications:  Cardura 4 mg oral tablet: 1 tab(s) orally once a day (28 Jan 2018 00:09)  Centrum Silver Men&#x27;s: 1    (28 Jan 2018 00:09)  simvastatin 80 mg oral tablet: 1 tab(s) orally once a day (at bedtime) (28 Jan 2018 00:09)  verapamil 240 mg/24 hours oral capsule, extended release: 1 cap(s) orally once a day (28 Jan 2018 00:09)    PHYSICAL EXAM:  Gen : no acute distress  Neck: No LAD, No JVD  Respiratory: decreased in the bases  Cardiovascular: S1 and S2, RRR, no M/G/R  Gastrointestinal: BS+, soft, NT/ND  Extremities: No peripheral edema  Vascular: 2+ peripheral pulses  Neurological: A/O x 3, no focal deficits

## 2018-01-29 LAB
ALBUMIN SERPL ELPH-MCNC: 3.4 G/DL — SIGNIFICANT CHANGE UP (ref 3.3–5)
ALP SERPL-CCNC: 71 U/L — SIGNIFICANT CHANGE UP (ref 40–120)
ALT FLD-CCNC: 31 U/L — SIGNIFICANT CHANGE UP (ref 10–45)
ANION GAP SERPL CALC-SCNC: 13 MMOL/L — SIGNIFICANT CHANGE UP (ref 5–17)
ANION GAP SERPL CALC-SCNC: 15 MMOL/L — SIGNIFICANT CHANGE UP (ref 5–17)
APTT BLD: 30.3 SEC — SIGNIFICANT CHANGE UP (ref 27.5–37.4)
AST SERPL-CCNC: 64 U/L — HIGH (ref 10–40)
BILIRUB SERPL-MCNC: 1.3 MG/DL — HIGH (ref 0.2–1.2)
BUN SERPL-MCNC: 36 MG/DL — HIGH (ref 7–23)
BUN SERPL-MCNC: 37 MG/DL — HIGH (ref 7–23)
CALCIUM SERPL-MCNC: 9.3 MG/DL — SIGNIFICANT CHANGE UP (ref 8.4–10.5)
CALCIUM SERPL-MCNC: 9.3 MG/DL — SIGNIFICANT CHANGE UP (ref 8.4–10.5)
CHLORIDE SERPL-SCNC: 101 MMOL/L — SIGNIFICANT CHANGE UP (ref 96–108)
CHLORIDE SERPL-SCNC: 99 MMOL/L — SIGNIFICANT CHANGE UP (ref 96–108)
CK MB CFR SERPL CALC: 9.9 NG/ML — HIGH (ref 0–6.7)
CK SERPL-CCNC: 134 U/L — SIGNIFICANT CHANGE UP (ref 30–200)
CK SERPL-CCNC: 200 U/L — SIGNIFICANT CHANGE UP (ref 30–200)
CO2 SERPL-SCNC: 25 MMOL/L — SIGNIFICANT CHANGE UP (ref 22–31)
CO2 SERPL-SCNC: 25 MMOL/L — SIGNIFICANT CHANGE UP (ref 22–31)
CREAT SERPL-MCNC: 2 MG/DL — HIGH (ref 0.5–1.3)
CREAT SERPL-MCNC: 2.04 MG/DL — HIGH (ref 0.5–1.3)
GLUCOSE SERPL-MCNC: 86 MG/DL — SIGNIFICANT CHANGE UP (ref 70–99)
GLUCOSE SERPL-MCNC: 98 MG/DL — SIGNIFICANT CHANGE UP (ref 70–99)
HCT VFR BLD CALC: 40.3 % — SIGNIFICANT CHANGE UP (ref 39–50)
HCT VFR BLD CALC: 40.4 % — SIGNIFICANT CHANGE UP (ref 39–50)
HGB BLD-MCNC: 13.5 G/DL — SIGNIFICANT CHANGE UP (ref 13–17)
HGB BLD-MCNC: 13.7 G/DL — SIGNIFICANT CHANGE UP (ref 13–17)
INR BLD: 1.15 — SIGNIFICANT CHANGE UP (ref 0.88–1.16)
MAGNESIUM SERPL-MCNC: 2 MG/DL — SIGNIFICANT CHANGE UP (ref 1.6–2.6)
MCHC RBC-ENTMCNC: 29.1 PG — SIGNIFICANT CHANGE UP (ref 27–34)
MCHC RBC-ENTMCNC: 29.5 PG — SIGNIFICANT CHANGE UP (ref 27–34)
MCHC RBC-ENTMCNC: 33.5 G/DL — SIGNIFICANT CHANGE UP (ref 32–36)
MCHC RBC-ENTMCNC: 33.9 G/DL — SIGNIFICANT CHANGE UP (ref 32–36)
MCV RBC AUTO: 86.9 FL — SIGNIFICANT CHANGE UP (ref 80–100)
MCV RBC AUTO: 87.1 FL — SIGNIFICANT CHANGE UP (ref 80–100)
PHOSPHATE SERPL-MCNC: 3.2 MG/DL — SIGNIFICANT CHANGE UP (ref 2.5–4.5)
PLATELET # BLD AUTO: 108 K/UL — LOW (ref 150–400)
PLATELET # BLD AUTO: 130 K/UL — LOW (ref 150–400)
POTASSIUM SERPL-MCNC: 4.2 MMOL/L — SIGNIFICANT CHANGE UP (ref 3.5–5.3)
POTASSIUM SERPL-MCNC: 4.4 MMOL/L — SIGNIFICANT CHANGE UP (ref 3.5–5.3)
POTASSIUM SERPL-SCNC: 4.2 MMOL/L — SIGNIFICANT CHANGE UP (ref 3.5–5.3)
POTASSIUM SERPL-SCNC: 4.4 MMOL/L — SIGNIFICANT CHANGE UP (ref 3.5–5.3)
PROT SERPL-MCNC: 6.8 G/DL — SIGNIFICANT CHANGE UP (ref 6–8.3)
PROTHROM AB SERPL-ACNC: 12.8 SEC — HIGH (ref 9.8–12.7)
RBC # BLD: 4.64 M/UL — SIGNIFICANT CHANGE UP (ref 4.2–5.8)
RBC # BLD: 4.64 M/UL — SIGNIFICANT CHANGE UP (ref 4.2–5.8)
RBC # FLD: 14.3 % — SIGNIFICANT CHANGE UP (ref 10.3–16.9)
RBC # FLD: 14.3 % — SIGNIFICANT CHANGE UP (ref 10.3–16.9)
SODIUM SERPL-SCNC: 139 MMOL/L — SIGNIFICANT CHANGE UP (ref 135–145)
SODIUM SERPL-SCNC: 139 MMOL/L — SIGNIFICANT CHANGE UP (ref 135–145)
T3FREE SERPL-MCNC: 1.72 PG/ML — SIGNIFICANT CHANGE UP (ref 1.71–3.71)
T4 FREE SERPL-MCNC: 1.07 NG/DL — SIGNIFICANT CHANGE UP (ref 0.7–1.48)
TROPONIN T SERPL-MCNC: 2.12 NG/ML — CRITICAL HIGH (ref 0–0.01)
TROPONIN T SERPL-MCNC: 3.3 NG/ML — CRITICAL HIGH (ref 0–0.01)
WBC # BLD: 5.4 K/UL — SIGNIFICANT CHANGE UP (ref 3.8–10.5)
WBC # BLD: 7 K/UL — SIGNIFICANT CHANGE UP (ref 3.8–10.5)
WBC # FLD AUTO: 5.4 K/UL — SIGNIFICANT CHANGE UP (ref 3.8–10.5)
WBC # FLD AUTO: 7 K/UL — SIGNIFICANT CHANGE UP (ref 3.8–10.5)

## 2018-01-29 PROCEDURE — 71045 X-RAY EXAM CHEST 1 VIEW: CPT | Mod: 26

## 2018-01-29 PROCEDURE — 71250 CT THORAX DX C-: CPT | Mod: 26

## 2018-01-29 PROCEDURE — 93306 TTE W/DOPPLER COMPLETE: CPT | Mod: 26

## 2018-01-29 PROCEDURE — 70450 CT HEAD/BRAIN W/O DYE: CPT | Mod: 26

## 2018-01-29 PROCEDURE — 94010 BREATHING CAPACITY TEST: CPT | Mod: 26

## 2018-01-29 PROCEDURE — 74176 CT ABD & PELVIS W/O CONTRAST: CPT | Mod: 26

## 2018-01-29 RX ORDER — POLYETHYLENE GLYCOL 3350 17 G/17G
17 POWDER, FOR SOLUTION ORAL DAILY
Qty: 0 | Refills: 0 | Status: DISCONTINUED | OUTPATIENT
Start: 2018-01-29 | End: 2018-02-08

## 2018-01-29 RX ORDER — KETOROLAC TROMETHAMINE 0.5 %
1 DROPS OPHTHALMIC (EYE) THREE TIMES A DAY
Qty: 0 | Refills: 0 | Status: DISCONTINUED | OUTPATIENT
Start: 2018-01-29 | End: 2018-02-08

## 2018-01-29 RX ORDER — HYDRALAZINE HCL 50 MG
10 TABLET ORAL ONCE
Qty: 0 | Refills: 0 | Status: COMPLETED | OUTPATIENT
Start: 2018-01-29 | End: 2018-01-29

## 2018-01-29 RX ORDER — OFLOXACIN 0.3 %
1 DROPS OPHTHALMIC (EYE) THREE TIMES A DAY
Qty: 0 | Refills: 0 | Status: DISCONTINUED | OUTPATIENT
Start: 2018-01-29 | End: 2018-02-08

## 2018-01-29 RX ORDER — SENNA PLUS 8.6 MG/1
2 TABLET ORAL AT BEDTIME
Qty: 0 | Refills: 0 | Status: DISCONTINUED | OUTPATIENT
Start: 2018-01-29 | End: 2018-02-08

## 2018-01-29 RX ORDER — SODIUM CHLORIDE 9 MG/ML
1000 INJECTION INTRAMUSCULAR; INTRAVENOUS; SUBCUTANEOUS
Qty: 0 | Refills: 0 | Status: DISCONTINUED | OUTPATIENT
Start: 2018-01-29 | End: 2018-01-31

## 2018-01-29 RX ORDER — DOXAZOSIN MESYLATE 4 MG
4 TABLET ORAL AT BEDTIME
Qty: 0 | Refills: 0 | Status: DISCONTINUED | OUTPATIENT
Start: 2018-01-29 | End: 2018-02-08

## 2018-01-29 RX ORDER — DOCUSATE SODIUM 100 MG
100 CAPSULE ORAL
Qty: 0 | Refills: 0 | Status: DISCONTINUED | OUTPATIENT
Start: 2018-01-29 | End: 2018-02-08

## 2018-01-29 RX ORDER — SODIUM CHLORIDE 9 MG/ML
3 INJECTION INTRAMUSCULAR; INTRAVENOUS; SUBCUTANEOUS EVERY 8 HOURS
Qty: 0 | Refills: 0 | Status: DISCONTINUED | OUTPATIENT
Start: 2018-01-29 | End: 2018-02-04

## 2018-01-29 RX ORDER — METOPROLOL TARTRATE 50 MG
12.5 TABLET ORAL EVERY 6 HOURS
Qty: 0 | Refills: 0 | Status: DISCONTINUED | OUTPATIENT
Start: 2018-01-29 | End: 2018-01-31

## 2018-01-29 RX ORDER — DIATRIZOATE MEGLUMINE 180 MG/ML
30 INJECTION, SOLUTION INTRAVESICAL ONCE
Qty: 0 | Refills: 0 | Status: COMPLETED | OUTPATIENT
Start: 2018-01-29 | End: 2018-01-29

## 2018-01-29 RX ORDER — PREDNISOLONE SODIUM PHOSPHATE 1 %
1 DROPS OPHTHALMIC (EYE) THREE TIMES A DAY
Qty: 0 | Refills: 0 | Status: DISCONTINUED | OUTPATIENT
Start: 2018-01-29 | End: 2018-02-08

## 2018-01-29 RX ADMIN — HEPARIN SODIUM 5000 UNIT(S): 5000 INJECTION INTRAVENOUS; SUBCUTANEOUS at 22:44

## 2018-01-29 RX ADMIN — Medication 1 DROP(S): at 15:06

## 2018-01-29 RX ADMIN — Medication 1 DROP(S): at 15:05

## 2018-01-29 RX ADMIN — PIPERACILLIN AND TAZOBACTAM 200 GRAM(S): 4; .5 INJECTION, POWDER, LYOPHILIZED, FOR SOLUTION INTRAVENOUS at 22:44

## 2018-01-29 RX ADMIN — PIPERACILLIN AND TAZOBACTAM 200 GRAM(S): 4; .5 INJECTION, POWDER, LYOPHILIZED, FOR SOLUTION INTRAVENOUS at 15:05

## 2018-01-29 RX ADMIN — PIPERACILLIN AND TAZOBACTAM 200 GRAM(S): 4; .5 INJECTION, POWDER, LYOPHILIZED, FOR SOLUTION INTRAVENOUS at 04:21

## 2018-01-29 RX ADMIN — HEPARIN SODIUM 5000 UNIT(S): 5000 INJECTION INTRAVENOUS; SUBCUTANEOUS at 06:39

## 2018-01-29 RX ADMIN — Medication 12.5 MILLIGRAM(S): at 10:46

## 2018-01-29 RX ADMIN — Medication 1 DROP(S): at 22:45

## 2018-01-29 RX ADMIN — Medication 81 MILLIGRAM(S): at 10:46

## 2018-01-29 RX ADMIN — SODIUM CHLORIDE 3 MILLILITER(S): 9 INJECTION INTRAMUSCULAR; INTRAVENOUS; SUBCUTANEOUS at 22:38

## 2018-01-29 RX ADMIN — PIPERACILLIN AND TAZOBACTAM 200 GRAM(S): 4; .5 INJECTION, POWDER, LYOPHILIZED, FOR SOLUTION INTRAVENOUS at 10:47

## 2018-01-29 RX ADMIN — Medication 10 MILLIGRAM(S): at 00:30

## 2018-01-29 RX ADMIN — HEPARIN SODIUM 5000 UNIT(S): 5000 INJECTION INTRAVENOUS; SUBCUTANEOUS at 13:49

## 2018-01-29 RX ADMIN — PANTOPRAZOLE SODIUM 40 MILLIGRAM(S): 20 TABLET, DELAYED RELEASE ORAL at 06:39

## 2018-01-29 RX ADMIN — ATORVASTATIN CALCIUM 80 MILLIGRAM(S): 80 TABLET, FILM COATED ORAL at 22:45

## 2018-01-29 RX ADMIN — Medication 12.5 MILLIGRAM(S): at 19:06

## 2018-01-29 RX ADMIN — SODIUM CHLORIDE 3 MILLILITER(S): 9 INJECTION INTRAMUSCULAR; INTRAVENOUS; SUBCUTANEOUS at 13:47

## 2018-01-29 RX ADMIN — POLYETHYLENE GLYCOL 3350 17 GRAM(S): 17 POWDER, FOR SOLUTION ORAL at 13:49

## 2018-01-29 RX ADMIN — DIATRIZOATE MEGLUMINE 30 MILLILITER(S): 180 INJECTION, SOLUTION INTRAVESICAL at 14:06

## 2018-01-29 NOTE — PROGRESS NOTE ADULT - SUBJECTIVE AND OBJECTIVE BOX
Transfer from      Operation / Date:  admit  NSTEMI, 3VCAD, GNR bacteremia    SUBJECTIVE ASSESSMENT:  85y Male seen and examined. Patients only c/o no BM in 5 days, otherwise feels well.. Denies fever, chest pain, palpitations, SOB, abdominal pain, n/v/c.         Vital Signs Last 24 Hrs  T(C): 36.9 (2018 09:14), Max: 37.3 (2018 14:39)  T(F): 98.5 (2018 09:14), Max: 99.1 (2018 14:39)  HR: 80 (2018 11:00) (56 - 96)  BP: 153/75 (2018 11:00) (126/63 - 184/71)  BP(mean): 103 (2018 11:00) (83 - 138)  RR: 26 (2018 11:00) (20 - 29)  SpO2: 97% (2018 11:00) (96% - 99%)  I&O's Detail    2018 07:01  -  2018 07:00  --------------------------------------------------------  IN:    0.9% NaCl: 700 mL    IV PiggyBack: 600 mL    Oral Fluid: 900 mL  Total IN: 2200 mL    OUT:    Indwelling Catheter - Urethral: 2330 mL  Total OUT: 2330 mL    Total NET: -130 mL      2018 07:01  -  2018 11:51  --------------------------------------------------------  IN:    0.9% NaCl: 250 mL    IV PiggyBack: 100 mL    Oral Fluid: 240 mL  Total IN: 590 mL    OUT:    Indwelling Catheter - Urethral: 170 mL  Total OUT: 170 mL    Total NET: 420 mL          CHEST TUBE:  NO   CHAD DRAIN: No.  EPICARDIAL WIRES:No.  TIE DOWNS:No.  LANE: Yes    PHYSICAL EXAM:    General:  Sitting comfortably in chair, no acute distress    Neurological: awake alert oriented, no neuro deficits, CN grossly intact    Cardiovascular: S1S2, RRR, no murmurs appreciated    Respiratory: CTA b/l, no wheeze/rhonchi/rales    Gastrointestinal: +BS, soft nontender nondistended    Extremities: warm and well perfused, no edema, no calf tenderness    Vascular: 2+ radial b/l, 1+ DP b/l    LABS:                        13.7   7.0   )-----------( 108      ( 2018 02:41 )             40.4       COUMADIN:  No    PT/INR - ( 2018 02:41 )   PT: 12.8 sec;   INR: 1.15          PTT - ( 2018 02:41 )  PTT:30.3 sec        139  |  101  |  37<H>  ----------------------------<  86  4.2   |  25  |  2.00<H>    Ca    9.3      2018 02:41  Phos  3.2       Mg     2.0         TPro  6.8  /  Alb  3.4  /  TBili  1.3<H>  /  DBili  x   /  AST  64<H>  /  ALT  31  /  AlkPhos  71        Urinalysis Basic - ( 2018 08:14 )    Color: Yellow / Appearance: Clear / S.010 / pH: x  Gluc: x / Ketone: NEGATIVE  / Bili: Negative / Urobili: 1.0 E.U./dL   Blood: x / Protein: 100 mg/dL / Nitrite: NEGATIVE   Leuk Esterase: NEGATIVE / RBC: > 10 /HPF / WBC < 5 /HPF   Sq Epi: x / Non Sq Epi: 0-5 /HPF / Bacteria: Present /HPF        MEDICATIONS  (STANDING):  aspirin enteric coated 81 milliGRAM(s) Oral daily  atorvastatin 80 milliGRAM(s) Oral at bedtime  diatrizoate meglumine/diatrizoate sodium. 30 milliLiter(s) Oral once  doxazosin 4 milliGRAM(s) Oral at bedtime  heparin  Injectable 5000 Unit(s) SubCutaneous every 8 hours  metoprolol     tartrate 12.5 milliGRAM(s) Oral every 6 hours  pantoprazole    Tablet 40 milliGRAM(s) Oral before breakfast  piperacillin/tazobactam IVPB. 3.375 Gram(s) IV Intermittent every 6 hours  polyethylene glycol 3350 17 Gram(s) Oral daily  sodium chloride 0.9% lock flush 3 milliLiter(s) IV Push every 8 hours    MEDICATIONS  (PRN):        RADIOLOGY & ADDITIONAL TESTS:  < from: Xray Chest 1 View AP -PORTABLE-Routine (18 @ 04:00) >  An AP portable view of the chest is compared to 2018. There is mild   prominence of the pulmonary vascularity, new finding. Linear atelectasis   in the left lobe of the liver again seen. Upper thoracic scoliosis.   Degenerative change of the left glenohumeral joint and left AC joint.    IMPRESSION:  Mild prominence of the pulmonary vascularity, new finding since prior   study.    < end of copied text >    < from: US Abdomen Limited (18 @ 19:48) >    IMPRESSION:    1. 0.9 cm round structure, most likely a stone, in the gallbladder neck.   Other round structures along the gallbladder wall may represent polyps.   Nonspecific, mild gallbladder wall thickening.  2. 1.9 cm exophytic complex cyst in the lower pole of the right kidney.  3. Possible small right pleural effusion.    < end of copied text >

## 2018-01-29 NOTE — ADVANCED PRACTICE NURSE CONSULT - RECOMMEDATIONS
Recommend moisture barrier ointment to site, do not cover. Spoke with MESHA Huber and house staff, pt's family.

## 2018-01-29 NOTE — ADVANCED PRACTICE NURSE CONSULT - ASSESSMENT
This is an 85 year old male, a poor historian with hypertension, hypercholesterolemia, BPH, s/p AAA repair,  BIBA to [facility] after feeling lightheaded and fell at home on Friday night. Lab work revealed troponin 23, no acute changes on EKG seen, patient was subsequently loaded with aspirin and plavix. Cardiac Cath done showed pLAD 80% stenosis, mCirc diffuse 80% stenosis, OM1 99% in the third proximal segment, pRCA 100% stenosis, EF 50% apical hypokinesis, apical septal hypokinesis.   Patient denies, syncope, chest pain, SOB, Orthopnea, palpitation, nausea, vomiting. Pt has moisture-associated dermatitis in gluteal cleft, no pressure injury. Area whitish in color, pt complains of pain, sitting on waffle cushion for pressure redistribution.

## 2018-01-29 NOTE — PROGRESS NOTE ADULT - ASSESSMENT
85 year old M, a poor historian with HTN HLD, BPH, s/p AAA repair (2008 at [place]),  BIBA to [facility] after feeling lightheaded and fell at home on Friday night. Lab work revealed troponin 23, no acute changes on EKG seen, NSTEMI, patient was subsequently loaded with aspirin and plavix. Cardiac Cath done showed pLAD 80% stenosis, mCirc diffuse 80% stenosis, OM1 99% in the third proximal segment, pRCA 100% stenosis, EF 50% apical hypokinesis, apical septal hypokinesis.  Blood culture sent was positive for gram negative rods. Patient was given a dose of Zosyn 3.375mg today before his transfer to [facility] ICU on heparin infusion under Dr. Gilman for evaluation for CABG. U/S abdomen over weekend suspicious for gall stone with mild thickening.  CT chest/head/abd today.    A/P:  Neurovascular: No delirium.   -CT head non con today, for agitation on admission    Cardiovascular: Hemodynamically stable. HR controlled.  -Hx HTN, HLD, hx AAA repair, NSTEMI, 3VCAD  -pre-op for CABG vs MIDCAB  -CT chest non con today  -Echo today, f/u results  -continue Asa 81mg daily, atorvastatin 80mg QHS, metoprolol 12.5 mg q12   -monitor HR/BP/tele    Respiratory: 02 Sat = 97% on RA.  -Encourage C+DB and Use of IS 10x / hr while awake.  -CXR- congestion, no obvious PTX/pleural effusion/infiltrate  -f/u AM CXR    GI: Stable. +/- Gall stone with thickening, f/u with Dr. Dee in GI should be consulted  -NPO for CT abd  -cont protonix for GI PPX.  -constipation- bowel regimen and miralax    Renal / : Cr 2.0, f/u Cr in afternoon  -Monitor renal function.  -Monitor I/O's.    Endocrine:    -A1c 5.0- monitor FS  -TSH low, f/u T3/T4    Hematologic: H&H stable  -f/u AM CBC.    ID: afebrile, WBC 7.0  -+ blood cx from OSH + GNR, continue zosyn. Getting Culture/sens from OSH.  -Blood cx here NGTD, U/A negative  -Observe for SIRS/Sepsis Syndrome.    Prophylaxis:  -DVT prophylaxis with 5000 SubQ Heparin q8h.  -SCD's    Disposition:  -pre-op 85 year old M, a poor historian with HTN HLD, BPH, s/p AAA repair (2008 at [place]),  BIBA to [facility] after feeling lightheaded and fell at home on Friday night. Lab work revealed troponin 23, no acute changes on EKG seen, NSTEMI, patient was subsequently loaded with aspirin and plavix. Cardiac Cath done showed pLAD 80% stenosis, mCirc diffuse 80% stenosis, OM1 99% in the third proximal segment, pRCA 100% stenosis, EF 50% apical hypokinesis, apical septal hypokinesis.  Blood culture sent was positive for gram negative rods. Patient was given a dose of Zosyn 3.375mg today before his transfer to [facility] ICU on heparin infusion under Dr. Gilman for evaluation for CABG. U/S abdomen over weekend suspicious for gall stone with mild thickening.  CT chest/head/abd today.    A/P:  Neurovascular: No delirium.   -CT head non con today, for agitation on admission    Cardiovascular: Hemodynamically stable. HR controlled.  -Hx HTN, HLD, hx AAA repair, NSTEMI, 3VCAD  -pre-op for CABG vs MIDCAB  -CT chest non con today  -Echo today, f/u results  -continue Asa 81mg daily, atorvastatin 80mg QHS, metoprolol 12.5 mg q12   -monitor HR/BP/tele    Respiratory: 02 Sat = 97% on RA.  -Encourage C+DB and Use of IS 10x / hr while awake.  -CXR- congestion, no obvious PTX/pleural effusion/infiltrate  -f/u AM CXR    GI: Stable. +/- Gall stone with thickening, f/u with Dr. Dee in GI should be consulted  -NPO for CT abd  -cont protonix for GI PPX.  -constipation- bowel regimen and miralax    Renal / : Cr 2.0, f/u Cr in afternoon  -Monitor renal function.  -Monitor I/O's.  -+ taylor for incontinence   -cont doxazosin 5mg qhs BPH  Endocrine:    -A1c 5.0- monitor FS  -TSH low, f/u T3/T4    Hematologic: H&H stable  -f/u AM CBC.    ID: afebrile, WBC 7.0  -+ blood cx from OSH + GNR, continue zosyn. Getting Culture/sens from OSH.  -Blood cx here NGTD, U/A negative  -Observe for SIRS/Sepsis Syndrome.    Prophylaxis:  -DVT prophylaxis with 5000 SubQ Heparin q8h.  -SCD's    Disposition:  -pre-op

## 2018-01-30 LAB
ALBUMIN SERPL ELPH-MCNC: 3.3 G/DL — SIGNIFICANT CHANGE UP (ref 3.3–5)
ALP SERPL-CCNC: 72 U/L — SIGNIFICANT CHANGE UP (ref 40–120)
ALT FLD-CCNC: 24 U/L — SIGNIFICANT CHANGE UP (ref 10–45)
ANION GAP SERPL CALC-SCNC: 12 MMOL/L — SIGNIFICANT CHANGE UP (ref 5–17)
AST SERPL-CCNC: 36 U/L — SIGNIFICANT CHANGE UP (ref 10–40)
BILIRUB SERPL-MCNC: 0.8 MG/DL — SIGNIFICANT CHANGE UP (ref 0.2–1.2)
BUN SERPL-MCNC: 31 MG/DL — HIGH (ref 7–23)
CALCIUM SERPL-MCNC: 9.4 MG/DL — SIGNIFICANT CHANGE UP (ref 8.4–10.5)
CHLORIDE SERPL-SCNC: 104 MMOL/L — SIGNIFICANT CHANGE UP (ref 96–108)
CO2 SERPL-SCNC: 24 MMOL/L — SIGNIFICANT CHANGE UP (ref 22–31)
CREAT SERPL-MCNC: 1.99 MG/DL — HIGH (ref 0.5–1.3)
GLUCOSE SERPL-MCNC: 98 MG/DL — SIGNIFICANT CHANGE UP (ref 70–99)
HCT VFR BLD CALC: 37.8 % — LOW (ref 39–50)
HGB BLD-MCNC: 12.8 G/DL — LOW (ref 13–17)
MAGNESIUM SERPL-MCNC: 2.3 MG/DL — SIGNIFICANT CHANGE UP (ref 1.6–2.6)
MCHC RBC-ENTMCNC: 29.1 PG — SIGNIFICANT CHANGE UP (ref 27–34)
MCHC RBC-ENTMCNC: 33.9 G/DL — SIGNIFICANT CHANGE UP (ref 32–36)
MCV RBC AUTO: 85.9 FL — SIGNIFICANT CHANGE UP (ref 80–100)
PLATELET # BLD AUTO: 133 K/UL — LOW (ref 150–400)
POTASSIUM SERPL-MCNC: 4.1 MMOL/L — SIGNIFICANT CHANGE UP (ref 3.5–5.3)
POTASSIUM SERPL-SCNC: 4.1 MMOL/L — SIGNIFICANT CHANGE UP (ref 3.5–5.3)
PROT SERPL-MCNC: 6.5 G/DL — SIGNIFICANT CHANGE UP (ref 6–8.3)
RBC # BLD: 4.4 M/UL — SIGNIFICANT CHANGE UP (ref 4.2–5.8)
RBC # FLD: 14.4 % — SIGNIFICANT CHANGE UP (ref 10.3–16.9)
SODIUM SERPL-SCNC: 140 MMOL/L — SIGNIFICANT CHANGE UP (ref 135–145)
TROPONIN T SERPL-MCNC: 2.28 NG/ML — CRITICAL HIGH (ref 0–0.01)
WBC # BLD: 5.1 K/UL — SIGNIFICANT CHANGE UP (ref 3.8–10.5)
WBC # FLD AUTO: 5.1 K/UL — SIGNIFICANT CHANGE UP (ref 3.8–10.5)

## 2018-01-30 PROCEDURE — 71045 X-RAY EXAM CHEST 1 VIEW: CPT | Mod: 26

## 2018-01-30 RX ORDER — HYDRALAZINE HCL 50 MG
10 TABLET ORAL ONCE
Qty: 0 | Refills: 0 | Status: COMPLETED | OUTPATIENT
Start: 2018-01-30 | End: 2018-01-30

## 2018-01-30 RX ORDER — CHLORHEXIDINE GLUCONATE 213 G/1000ML
1 SOLUTION TOPICAL ONCE
Qty: 0 | Refills: 0 | Status: COMPLETED | OUTPATIENT
Start: 2018-01-30 | End: 2018-01-30

## 2018-01-30 RX ORDER — ACETAMINOPHEN 500 MG
650 TABLET ORAL EVERY 6 HOURS
Qty: 0 | Refills: 0 | Status: DISCONTINUED | OUTPATIENT
Start: 2018-01-30 | End: 2018-02-08

## 2018-01-30 RX ORDER — CHLORHEXIDINE GLUCONATE 213 G/1000ML
1 SOLUTION TOPICAL ONCE
Qty: 0 | Refills: 0 | Status: COMPLETED | OUTPATIENT
Start: 2018-01-30 | End: 2018-01-31

## 2018-01-30 RX ORDER — SODIUM CHLORIDE 9 MG/ML
1000 INJECTION INTRAMUSCULAR; INTRAVENOUS; SUBCUTANEOUS
Qty: 0 | Refills: 0 | Status: DISCONTINUED | OUTPATIENT
Start: 2018-01-30 | End: 2018-02-08

## 2018-01-30 RX ORDER — CHLORHEXIDINE GLUCONATE 213 G/1000ML
10 SOLUTION TOPICAL ONCE
Qty: 0 | Refills: 0 | Status: DISCONTINUED | OUTPATIENT
Start: 2018-01-30 | End: 2018-01-31

## 2018-01-30 RX ORDER — CHLORHEXIDINE GLUCONATE 213 G/1000ML
1 SOLUTION TOPICAL ONCE
Qty: 0 | Refills: 0 | Status: COMPLETED | OUTPATIENT
Start: 2018-01-31 | End: 2018-01-31

## 2018-01-30 RX ADMIN — Medication 12.5 MILLIGRAM(S): at 00:08

## 2018-01-30 RX ADMIN — HEPARIN SODIUM 5000 UNIT(S): 5000 INJECTION INTRAVENOUS; SUBCUTANEOUS at 14:14

## 2018-01-30 RX ADMIN — Medication 4 MILLIGRAM(S): at 21:12

## 2018-01-30 RX ADMIN — Medication 1 DROP(S): at 06:23

## 2018-01-30 RX ADMIN — SODIUM CHLORIDE 3 MILLILITER(S): 9 INJECTION INTRAMUSCULAR; INTRAVENOUS; SUBCUTANEOUS at 14:54

## 2018-01-30 RX ADMIN — Medication 1 DROP(S): at 21:13

## 2018-01-30 RX ADMIN — PIPERACILLIN AND TAZOBACTAM 200 GRAM(S): 4; .5 INJECTION, POWDER, LYOPHILIZED, FOR SOLUTION INTRAVENOUS at 06:22

## 2018-01-30 RX ADMIN — CHLORHEXIDINE GLUCONATE 1 APPLICATION(S): 213 SOLUTION TOPICAL at 22:20

## 2018-01-30 RX ADMIN — Medication 10 MILLIGRAM(S): at 05:45

## 2018-01-30 RX ADMIN — Medication 100 MILLIGRAM(S): at 18:17

## 2018-01-30 RX ADMIN — HEPARIN SODIUM 5000 UNIT(S): 5000 INJECTION INTRAVENOUS; SUBCUTANEOUS at 21:13

## 2018-01-30 RX ADMIN — HEPARIN SODIUM 5000 UNIT(S): 5000 INJECTION INTRAVENOUS; SUBCUTANEOUS at 06:22

## 2018-01-30 RX ADMIN — Medication 1 DROP(S): at 14:15

## 2018-01-30 RX ADMIN — Medication 1 DROP(S): at 06:22

## 2018-01-30 RX ADMIN — Medication 650 MILLIGRAM(S): at 19:00

## 2018-01-30 RX ADMIN — Medication 4 MILLIGRAM(S): at 00:07

## 2018-01-30 RX ADMIN — Medication 12.5 MILLIGRAM(S): at 18:17

## 2018-01-30 RX ADMIN — Medication 650 MILLIGRAM(S): at 18:18

## 2018-01-30 RX ADMIN — SODIUM CHLORIDE 3 MILLILITER(S): 9 INJECTION INTRAMUSCULAR; INTRAVENOUS; SUBCUTANEOUS at 06:23

## 2018-01-30 RX ADMIN — SODIUM CHLORIDE 3 MILLILITER(S): 9 INJECTION INTRAMUSCULAR; INTRAVENOUS; SUBCUTANEOUS at 21:13

## 2018-01-30 RX ADMIN — PIPERACILLIN AND TAZOBACTAM 200 GRAM(S): 4; .5 INJECTION, POWDER, LYOPHILIZED, FOR SOLUTION INTRAVENOUS at 18:17

## 2018-01-30 RX ADMIN — ATORVASTATIN CALCIUM 80 MILLIGRAM(S): 80 TABLET, FILM COATED ORAL at 21:12

## 2018-01-30 RX ADMIN — SENNA PLUS 2 TABLET(S): 8.6 TABLET ORAL at 21:12

## 2018-01-30 RX ADMIN — Medication 12.5 MILLIGRAM(S): at 13:25

## 2018-01-30 RX ADMIN — Medication 81 MILLIGRAM(S): at 13:24

## 2018-01-30 RX ADMIN — SODIUM CHLORIDE 65 MILLILITER(S): 9 INJECTION INTRAMUSCULAR; INTRAVENOUS; SUBCUTANEOUS at 21:15

## 2018-01-30 RX ADMIN — PIPERACILLIN AND TAZOBACTAM 200 GRAM(S): 4; .5 INJECTION, POWDER, LYOPHILIZED, FOR SOLUTION INTRAVENOUS at 13:29

## 2018-01-30 RX ADMIN — Medication 12.5 MILLIGRAM(S): at 06:22

## 2018-01-30 RX ADMIN — PIPERACILLIN AND TAZOBACTAM 200 GRAM(S): 4; .5 INJECTION, POWDER, LYOPHILIZED, FOR SOLUTION INTRAVENOUS at 23:51

## 2018-01-30 NOTE — PROGRESS NOTE ADULT - SUBJECTIVE AND OBJECTIVE BOX
Planned Date of Surgery:       1/30/18                                                                                                           Surgeon: Dr. Galvez    Procedure: MIDCAB    HPI:  This is an 85 year old male, a poor historian with hypertension, hypercholesterolemia, BPH, s/p AAA repair,  BIBA to [facility] after feeling lightheaded and fell at home on Friday night. Lab work revealed troponin 23, no acute changes on EKG seen, NSTEMI, patient was subsequently loaded with aspirin and plavix. Cardiac Cath done showed pLAD 80% stenosis, mCirc diffuse 80% stenosis, OM1 99% in the third proximal segment, pRCA 100% stenosis, EF 50% apical hypokinesis, apical septal hypokinesis.   Patient denies, syncope, chest pain, SOB, Orthopnea, palpitation, nausea, vomiting.  Blood culture sent was positive for gram negative rods. Patient was given a dose of Zosyn 3.375mg today before his transfer to [facility] ICU on heparin infusion under Dr. Gilman for evaluation for CABG.    PAST MEDICAL & SURGICAL HISTORY:  Hemorrhoids  Hypercholesterolemia  Benign prostate hyperplasia  Hypertension  History of tonsillectomy  Status post cataract extraction: bilateral eyes  AAA (abdominal aortic aneurysm)      No Known Allergies      MEDICATIONS  (STANDING):  aspirin enteric coated 81 milliGRAM(s) Oral daily  atorvastatin 80 milliGRAM(s) Oral at bedtime  chlorhexidine 0.12% Liquid 10 milliLiter(s) Swish and Spit once  chlorhexidine 4% Liquid 1 Application(s) Topical once  chlorhexidine 4% Liquid 1 Application(s) Topical once  docusate sodium 100 milliGRAM(s) Oral two times a day  doxazosin 4 milliGRAM(s) Oral at bedtime  heparin  Injectable 5000 Unit(s) SubCutaneous every 8 hours  ketorolac 0.5% Ophthalmic Solution 1 Drop(s) Right EYE three times a day  metoprolol     tartrate 12.5 milliGRAM(s) Oral every 6 hours  ofloxacin 0.3% Solution 1 Drop(s) Right EYE three times a day  pantoprazole    Tablet 40 milliGRAM(s) Oral before breakfast  piperacillin/tazobactam IVPB. 3.375 Gram(s) IV Intermittent every 6 hours  polyethylene glycol 3350 17 Gram(s) Oral daily  prednisoLONE  forte 1% Suspension 1 Drop(s) Right EYE three times a day  senna 2 Tablet(s) Oral at bedtime  sodium chloride 0.9% lock flush 3 milliLiter(s) IV Push every 8 hours  sodium chloride 0.9%. 1000 milliLiter(s) (65 mL/Hr) IV Continuous <Continuous>    MEDICATIONS  (PRN):      On Beta Blocker? YES    Labs:                        12.8   5.1   )-----------( 133      ( 30 Jan 2018 10:39 )             37.8     01-30    140  |  104  |  31<H>  ----------------------------<  98  4.1   |  24  |  1.99<H>    Ca    9.4      30 Jan 2018 10:38  Phos  3.2     01-29  Mg     2.3     01-30    TPro  6.5  /  Alb  3.3  /  TBili  0.8  /  DBili  x   /  AST  36  /  ALT  24  /  AlkPhos  72  01-30    PT/INR - ( 29 Jan 2018 02:41 )   PT: 12.8 sec;   INR: 1.15          PTT - ( 29 Jan 2018 02:41 )  PTT:30.3 sec    Hemoglobin A1C, Whole Blood: 5.0 % (01-27-18 @ 23:37)      CARDIAC MARKERS ( 30 Jan 2018 10:38 )  x     / 2.28 ng/mL / x     / x     / x      CARDIAC MARKERS ( 29 Jan 2018 18:19 )  x     / 3.30 ng/mL / 134 U/L / x     / 9.9 ng/mL  CARDIAC MARKERS ( 29 Jan 2018 02:41 )  x     / 2.12 ng/mL / 200 U/L / x     / x              Hgb A1C: 5.0    EKG:  < from: 12 Lead ECG (01.28.18 @ 18:38) >    Ventricular Rate 64 BPM    Atrial Rate 70 BPM    P-R Interval 146 ms    QRS Duration 138 ms    Q-T Interval 500 ms    QTC Calculation(Bezet) 515 ms    P Axis 55 degrees    R Axis 29 degrees    T Axis 22 degrees    Diagnosis Line Normal sinus rhythm  Right bundle branch block  Abnormal ECG    Confirmed by NELIA GONZALEZ, [facility] ([id number]) on 1/29/2018 7:16:18 AM    < end of copied text >    CXR:  < from: Xray Chest 1 View AP -PORTABLE-Routine (01.29.18 @ 04:00) >  An AP portable view of the chest is compared to 1/27/2018. There is mild   prominence of the pulmonary vascularity, new finding. Linear atelectasis   in the left lobe of the liver again seen. Upper thoracic scoliosis.   Degenerative change of the left glenohumeral joint and left AC joint.    IMPRESSION:  Mild prominence of the pulmonary vascularity, new finding since prior   study.    < end of copied text >      CT Scans:  < from: CT Head No Cont (01.29.18 @ 18:58) >  IMPRESSION: Right basal ganglia/corona radiata subacute to chronic   infarct. Moderate small vessel ischemic disease. No intracranial   hemorrhage.    < end of copied text >        Cath Report:    Echo:    PFT's:    Carotid Duplex:    Consult in Chart?  YES / NO  Consent in Chart? YES / NO  Pre-op Orders Placed? YES / NO  Blood Prodeucts Ordered? YES / NO  NPO ordered? YES / NO Planned Date of Surgery:       1/30/18                                                                                                           Surgeon: Dr. Galvez    Procedure: MIDCAB    HPI:  This is an 85 year old male, a poor historian with hypertension, hypercholesterolemia, BPH, s/p AAA repair,  BIBA to [facility] after feeling lightheaded and fell at home on Friday night. Lab work revealed troponin 23, no acute changes on EKG seen, NSTEMI, patient was subsequently loaded with aspirin and plavix. Cardiac Cath done showed pLAD 80% stenosis, mCirc diffuse 80% stenosis, OM1 99% in the third proximal segment, pRCA 100% stenosis, EF 50% apical hypokinesis, apical septal hypokinesis.   Patient denies, syncope, chest pain, SOB, Orthopnea, palpitation, nausea, vomiting.  Blood culture sent was positive for gram negative rods. Patient was given a dose of Zosyn 3.375mg today before his transfer to [facility] ICU on heparin infusion under Dr. Gilman for evaluation for CABG.    PAST MEDICAL & SURGICAL HISTORY:  Hemorrhoids  Hypercholesterolemia  Benign prostate hyperplasia  Hypertension  History of tonsillectomy  Status post cataract extraction: bilateral eyes  AAA (abdominal aortic aneurysm)      No Known Allergies      MEDICATIONS  (STANDING):  aspirin enteric coated 81 milliGRAM(s) Oral daily  atorvastatin 80 milliGRAM(s) Oral at bedtime  chlorhexidine 0.12% Liquid 10 milliLiter(s) Swish and Spit once  chlorhexidine 4% Liquid 1 Application(s) Topical once  chlorhexidine 4% Liquid 1 Application(s) Topical once  docusate sodium 100 milliGRAM(s) Oral two times a day  doxazosin 4 milliGRAM(s) Oral at bedtime  heparin  Injectable 5000 Unit(s) SubCutaneous every 8 hours  ketorolac 0.5% Ophthalmic Solution 1 Drop(s) Right EYE three times a day  metoprolol     tartrate 12.5 milliGRAM(s) Oral every 6 hours  ofloxacin 0.3% Solution 1 Drop(s) Right EYE three times a day  pantoprazole    Tablet 40 milliGRAM(s) Oral before breakfast  piperacillin/tazobactam IVPB. 3.375 Gram(s) IV Intermittent every 6 hours  polyethylene glycol 3350 17 Gram(s) Oral daily  prednisoLONE  forte 1% Suspension 1 Drop(s) Right EYE three times a day  senna 2 Tablet(s) Oral at bedtime  sodium chloride 0.9% lock flush 3 milliLiter(s) IV Push every 8 hours  sodium chloride 0.9%. 1000 milliLiter(s) (65 mL/Hr) IV Continuous <Continuous>    MEDICATIONS  (PRN):      On Beta Blocker? YES    Labs:                        12.8   5.1   )-----------( 133      ( 30 Jan 2018 10:39 )             37.8     01-30    140  |  104  |  31<H>  ----------------------------<  98  4.1   |  24  |  1.99<H>    Ca    9.4      30 Jan 2018 10:38  Phos  3.2     01-29  Mg     2.3     01-30    TPro  6.5  /  Alb  3.3  /  TBili  0.8  /  DBili  x   /  AST  36  /  ALT  24  /  AlkPhos  72  01-30    PT/INR - ( 29 Jan 2018 02:41 )   PT: 12.8 sec;   INR: 1.15          PTT - ( 29 Jan 2018 02:41 )  PTT:30.3 sec    Hemoglobin A1C, Whole Blood: 5.0 % (01-27-18 @ 23:37)      CARDIAC MARKERS ( 30 Jan 2018 10:38 )  x     / 2.28 ng/mL / x     / x     / x      CARDIAC MARKERS ( 29 Jan 2018 18:19 )  x     / 3.30 ng/mL / 134 U/L / x     / 9.9 ng/mL  CARDIAC MARKERS ( 29 Jan 2018 02:41 )  x     / 2.12 ng/mL / 200 U/L / x     / x              Hgb A1C: 5.0    EKG:  < from: 12 Lead ECG (01.28.18 @ 18:38) >    Ventricular Rate 64 BPM    Atrial Rate 70 BPM    P-R Interval 146 ms    QRS Duration 138 ms    Q-T Interval 500 ms    QTC Calculation(Bezet) 515 ms    P Axis 55 degrees    R Axis 29 degrees    T Axis 22 degrees    Diagnosis Line Normal sinus rhythm  Right bundle branch block  Abnormal ECG    Confirmed by NELIA GONZALEZ, [facility] ([id number]) on 1/29/2018 7:16:18 AM    < end of copied text >    CXR:  < from: Xray Chest 1 View AP -PORTABLE-Routine (01.29.18 @ 04:00) >  An AP portable view of the chest is compared to 1/27/2018. There is mild   prominence of the pulmonary vascularity, new finding. Linear atelectasis   in the left lobe of the liver again seen. Upper thoracic scoliosis.   Degenerative change of the left glenohumeral joint and left AC joint.    IMPRESSION:  Mild prominence of the pulmonary vascularity, new finding since prior   study.    < end of copied text >      CT Scans:  < from: CT Head No Cont (01.29.18 @ 18:58) >  IMPRESSION: Right basal ganglia/corona radiata subacute to chronic   infarct. Moderate small vessel ischemic disease. No intracranial   hemorrhage.    < end of copied text >    < from: CT Chest No Cont (01.29.18 @ 18:58) >  IMPRESSION:  Probable mild sigmoid diverticulitis. No diverticular abscess or freeair.    1.9 cm right upper lobe subsolid, probably part solid nodule. Pulmonary   consultation and a follow-up chest CT in 3 months is recommended.    Severe emphysema.    Trace pericardial effusion.    Cholelithiasis.    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    Bilateral intermediate and high density renal lesions which may represent   solid renal neoplasms or hyperdense cysts. CT or MRI with renal mass   protocol is recommended..    Stranding of the Subcutaneous tissues of the buttocks at the level of the   sacrum/coccyx, correlation with physical exam is recommended to exclude   inflammation.    Status post endovascular stent grafting of 4.6 cm infrarenal abdominal   aortic aneurysm. Bilateral common iliac artery stent grafts. Severe   atherosclerotic calcifications of the thoracic and abdominal aorta and   bilateral common femoral arteries.    < end of copied text >        Cath Report: 2VCAD, reviewed by Dr. Dee and Dr. Galvez    Echo:  < from: Echocardiogram (01.29.18 @ 11:09) >  Left ventricular hypertrophy presentThe left ventricular ejection   fraction is   estimated to be 50-55%The right ventricle is not well visualized.Probably   normal right ventricular size and function.The left atrium is dilated.The   mitral inflow pattern is consistent with impaired left ventricular   relaxation   with mildly elevated left atrial pressure (8-14mmHg).  Calcified aortic   valve.No aortic regurgitation noted.No hemodynamically significant   valvular   aortic stenosis.Mitral valve thickening noted.No mitral regurgitation   noted.There is trace tricuspid regurgitation.There was insufficient TR   detected from which to calculate pulmonary artery systolic pressure.    There is   trace pulmonic regurgitation.The inferior vena cava was not well   visualized.There is no pericardial effusion.    < end of copied text >      PFT's:  FEV 50%, FVC 42%, FEV/%    Carotid Duplex:  < from: US Duplex Carotid Arteries Complete, Bilateral (01.28.18 @ 19:47) >  IMPRESSION:  Less than 50% stenosis of bilateral proximal internal carotid arteries,   based on plaque estimates.    < end of copied text >      Consult in Chart?  YES   Consent in Chart? YES   Pre-op Orders Placed? YES   Blood Prodeucts Ordered? YES   NPO ordered? YES

## 2018-01-31 ENCOUNTER — APPOINTMENT (OUTPATIENT)
Dept: CARDIOTHORACIC SURGERY | Facility: HOSPITAL | Age: 83
End: 2018-01-31

## 2018-01-31 DIAGNOSIS — N39.3 STRESS INCONTINENCE (FEMALE) (MALE): ICD-10-CM

## 2018-01-31 DIAGNOSIS — Z09 ENCOUNTER FOR FOLLOW-UP EXAMINATION AFTER COMPLETED TREATMENT FOR CONDITIONS OTHER THAN MALIGNANT NEOPLASM: ICD-10-CM

## 2018-01-31 DIAGNOSIS — I25.2 OLD MYOCARDIAL INFARCTION: ICD-10-CM

## 2018-01-31 DIAGNOSIS — Z12.11 ENCOUNTER FOR SCREENING FOR MALIGNANT NEOPLASM OF COLON: ICD-10-CM

## 2018-01-31 DIAGNOSIS — K92.1 MELENA: ICD-10-CM

## 2018-01-31 DIAGNOSIS — Z87.19 PERSONAL HISTORY OF OTHER DISEASES OF THE DIGESTIVE SYSTEM: ICD-10-CM

## 2018-01-31 DIAGNOSIS — Z87.438 PERSONAL HISTORY OF OTHER DISEASES OF MALE GENITAL ORGANS: ICD-10-CM

## 2018-01-31 DIAGNOSIS — Z87.898 PERSONAL HISTORY OF OTHER SPECIFIED CONDITIONS: ICD-10-CM

## 2018-01-31 DIAGNOSIS — Z86.79 PERSONAL HISTORY OF OTHER DISEASES OF THE CIRCULATORY SYSTEM: ICD-10-CM

## 2018-01-31 LAB
ALBUMIN SERPL ELPH-MCNC: 3.1 G/DL — LOW (ref 3.3–5)
ALBUMIN SERPL ELPH-MCNC: 3.3 G/DL — SIGNIFICANT CHANGE UP (ref 3.3–5)
ALP SERPL-CCNC: 102 U/L — SIGNIFICANT CHANGE UP (ref 40–120)
ALP SERPL-CCNC: 86 U/L — SIGNIFICANT CHANGE UP (ref 40–120)
ALT FLD-CCNC: 30 U/L — SIGNIFICANT CHANGE UP (ref 10–45)
ALT FLD-CCNC: 31 U/L — SIGNIFICANT CHANGE UP (ref 10–45)
ANION GAP SERPL CALC-SCNC: 13 MMOL/L — SIGNIFICANT CHANGE UP (ref 5–17)
ANION GAP SERPL CALC-SCNC: 13 MMOL/L — SIGNIFICANT CHANGE UP (ref 5–17)
ANION GAP SERPL CALC-SCNC: 14 MMOL/L — SIGNIFICANT CHANGE UP (ref 5–17)
APTT BLD: 30.5 SEC — SIGNIFICANT CHANGE UP (ref 27.5–37.4)
APTT BLD: 35.7 SEC — SIGNIFICANT CHANGE UP (ref 27.5–37.4)
APTT BLD: 36.6 SEC — SIGNIFICANT CHANGE UP (ref 27.5–37.4)
AST SERPL-CCNC: 38 U/L — SIGNIFICANT CHANGE UP (ref 10–40)
AST SERPL-CCNC: 40 U/L — SIGNIFICANT CHANGE UP (ref 10–40)
BASE EXCESS BLDA CALC-SCNC: -1.6 MMOL/L — SIGNIFICANT CHANGE UP (ref -2–3)
BASOPHILS NFR BLD AUTO: 0.4 % — SIGNIFICANT CHANGE UP (ref 0–2)
BILIRUB SERPL-MCNC: 0.5 MG/DL — SIGNIFICANT CHANGE UP (ref 0.2–1.2)
BILIRUB SERPL-MCNC: 0.6 MG/DL — SIGNIFICANT CHANGE UP (ref 0.2–1.2)
BUN SERPL-MCNC: 30 MG/DL — HIGH (ref 7–23)
BUN SERPL-MCNC: 31 MG/DL — HIGH (ref 7–23)
BUN SERPL-MCNC: 35 MG/DL — HIGH (ref 7–23)
CALCIUM SERPL-MCNC: 9 MG/DL — SIGNIFICANT CHANGE UP (ref 8.4–10.5)
CALCIUM SERPL-MCNC: 9.1 MG/DL — SIGNIFICANT CHANGE UP (ref 8.4–10.5)
CALCIUM SERPL-MCNC: 9.2 MG/DL — SIGNIFICANT CHANGE UP (ref 8.4–10.5)
CHLORIDE SERPL-SCNC: 106 MMOL/L — SIGNIFICANT CHANGE UP (ref 96–108)
CHLORIDE SERPL-SCNC: 106 MMOL/L — SIGNIFICANT CHANGE UP (ref 96–108)
CHLORIDE SERPL-SCNC: 108 MMOL/L — SIGNIFICANT CHANGE UP (ref 96–108)
CO2 SERPL-SCNC: 22 MMOL/L — SIGNIFICANT CHANGE UP (ref 22–31)
CO2 SERPL-SCNC: 22 MMOL/L — SIGNIFICANT CHANGE UP (ref 22–31)
CO2 SERPL-SCNC: 24 MMOL/L — SIGNIFICANT CHANGE UP (ref 22–31)
CREAT SERPL-MCNC: 1.76 MG/DL — HIGH (ref 0.5–1.3)
CREAT SERPL-MCNC: 1.92 MG/DL — HIGH (ref 0.5–1.3)
CREAT SERPL-MCNC: 2.13 MG/DL — HIGH (ref 0.5–1.3)
CULTURE RESULTS: NO GROWTH — SIGNIFICANT CHANGE UP
EOSINOPHIL NFR BLD AUTO: 2.9 % — SIGNIFICANT CHANGE UP (ref 0–6)
GAS PNL BLDA: SIGNIFICANT CHANGE UP
GLUCOSE BLDC GLUCOMTR-MCNC: 114 MG/DL — HIGH (ref 70–99)
GLUCOSE BLDC GLUCOMTR-MCNC: 90 MG/DL — SIGNIFICANT CHANGE UP (ref 70–99)
GLUCOSE BLDC GLUCOMTR-MCNC: 97 MG/DL — SIGNIFICANT CHANGE UP (ref 70–99)
GLUCOSE SERPL-MCNC: 137 MG/DL — HIGH (ref 70–99)
GLUCOSE SERPL-MCNC: 140 MG/DL — HIGH (ref 70–99)
GLUCOSE SERPL-MCNC: 92 MG/DL — SIGNIFICANT CHANGE UP (ref 70–99)
HCO3 BLDA-SCNC: 22 MMOL/L — SIGNIFICANT CHANGE UP (ref 21–28)
HCT VFR BLD CALC: 33.5 % — LOW (ref 39–50)
HCT VFR BLD CALC: 34.2 % — LOW (ref 39–50)
HCT VFR BLD CALC: 37.1 % — LOW (ref 39–50)
HGB BLD-MCNC: 11.1 G/DL — LOW (ref 13–17)
HGB BLD-MCNC: 11.2 G/DL — LOW (ref 13–17)
HGB BLD-MCNC: 12 G/DL — LOW (ref 13–17)
INR BLD: 1.04 — SIGNIFICANT CHANGE UP (ref 0.88–1.16)
INR BLD: 1.15 — SIGNIFICANT CHANGE UP (ref 0.88–1.16)
INR BLD: 1.19 — HIGH (ref 0.88–1.16)
LACTATE SERPL-SCNC: 0.6 MMOL/L — SIGNIFICANT CHANGE UP (ref 0.5–2)
LACTATE SERPL-SCNC: 0.8 MMOL/L — SIGNIFICANT CHANGE UP (ref 0.5–2)
LYMPHOCYTES # BLD AUTO: 15.9 % — SIGNIFICANT CHANGE UP (ref 13–44)
MAGNESIUM SERPL-MCNC: 1.9 MG/DL — SIGNIFICANT CHANGE UP (ref 1.6–2.6)
MAGNESIUM SERPL-MCNC: 2.1 MG/DL — SIGNIFICANT CHANGE UP (ref 1.6–2.6)
MAGNESIUM SERPL-MCNC: 2.2 MG/DL — SIGNIFICANT CHANGE UP (ref 1.6–2.6)
MCHC RBC-ENTMCNC: 28.7 PG — SIGNIFICANT CHANGE UP (ref 27–34)
MCHC RBC-ENTMCNC: 28.8 PG — SIGNIFICANT CHANGE UP (ref 27–34)
MCHC RBC-ENTMCNC: 29.3 PG — SIGNIFICANT CHANGE UP (ref 27–34)
MCHC RBC-ENTMCNC: 32.3 G/DL — SIGNIFICANT CHANGE UP (ref 32–36)
MCHC RBC-ENTMCNC: 32.7 G/DL — SIGNIFICANT CHANGE UP (ref 32–36)
MCHC RBC-ENTMCNC: 33.1 G/DL — SIGNIFICANT CHANGE UP (ref 32–36)
MCV RBC AUTO: 87.7 FL — SIGNIFICANT CHANGE UP (ref 80–100)
MCV RBC AUTO: 88.4 FL — SIGNIFICANT CHANGE UP (ref 80–100)
MCV RBC AUTO: 89 FL — SIGNIFICANT CHANGE UP (ref 80–100)
MONOCYTES NFR BLD AUTO: 8.3 % — SIGNIFICANT CHANGE UP (ref 2–14)
NEUTROPHILS NFR BLD AUTO: 72.5 % — SIGNIFICANT CHANGE UP (ref 43–77)
PCO2 BLDA: 35 MMHG — SIGNIFICANT CHANGE UP (ref 35–48)
PH BLDA: 7.42 — SIGNIFICANT CHANGE UP (ref 7.35–7.45)
PHOSPHATE SERPL-MCNC: 3.4 MG/DL — SIGNIFICANT CHANGE UP (ref 2.5–4.5)
PHOSPHATE SERPL-MCNC: 3.9 MG/DL — SIGNIFICANT CHANGE UP (ref 2.5–4.5)
PLATELET # BLD AUTO: 119 K/UL — LOW (ref 150–400)
PLATELET # BLD AUTO: 136 K/UL — LOW (ref 150–400)
PLATELET # BLD AUTO: 143 K/UL — LOW (ref 150–400)
PO2 BLDA: 211 MMHG — HIGH (ref 83–108)
POTASSIUM SERPL-MCNC: 3.6 MMOL/L — SIGNIFICANT CHANGE UP (ref 3.5–5.3)
POTASSIUM SERPL-MCNC: 3.8 MMOL/L — SIGNIFICANT CHANGE UP (ref 3.5–5.3)
POTASSIUM SERPL-MCNC: 3.8 MMOL/L — SIGNIFICANT CHANGE UP (ref 3.5–5.3)
POTASSIUM SERPL-SCNC: 3.6 MMOL/L — SIGNIFICANT CHANGE UP (ref 3.5–5.3)
POTASSIUM SERPL-SCNC: 3.8 MMOL/L — SIGNIFICANT CHANGE UP (ref 3.5–5.3)
POTASSIUM SERPL-SCNC: 3.8 MMOL/L — SIGNIFICANT CHANGE UP (ref 3.5–5.3)
PROT SERPL-MCNC: 5.8 G/DL — LOW (ref 6–8.3)
PROT SERPL-MCNC: 6.5 G/DL — SIGNIFICANT CHANGE UP (ref 6–8.3)
PROTHROM AB SERPL-ACNC: 11.6 SEC — SIGNIFICANT CHANGE UP (ref 9.8–12.7)
PROTHROM AB SERPL-ACNC: 12.8 SEC — HIGH (ref 9.8–12.7)
PROTHROM AB SERPL-ACNC: 13.3 SEC — HIGH (ref 9.8–12.7)
RBC # BLD: 3.79 M/UL — LOW (ref 4.2–5.8)
RBC # BLD: 3.9 M/UL — LOW (ref 4.2–5.8)
RBC # BLD: 4.17 M/UL — LOW (ref 4.2–5.8)
RBC # FLD: 14.2 % — SIGNIFICANT CHANGE UP (ref 10.3–16.9)
RBC # FLD: 14.4 % — SIGNIFICANT CHANGE UP (ref 10.3–16.9)
RBC # FLD: 14.7 % — SIGNIFICANT CHANGE UP (ref 10.3–16.9)
SAO2 % BLDA: 99 % — SIGNIFICANT CHANGE UP (ref 95–100)
SODIUM SERPL-SCNC: 141 MMOL/L — SIGNIFICANT CHANGE UP (ref 135–145)
SODIUM SERPL-SCNC: 143 MMOL/L — SIGNIFICANT CHANGE UP (ref 135–145)
SODIUM SERPL-SCNC: 144 MMOL/L — SIGNIFICANT CHANGE UP (ref 135–145)
SPECIMEN SOURCE: SIGNIFICANT CHANGE UP
WBC # BLD: 5.9 K/UL — SIGNIFICANT CHANGE UP (ref 3.8–10.5)
WBC # BLD: 7.7 K/UL — SIGNIFICANT CHANGE UP (ref 3.8–10.5)
WBC # BLD: 7.7 K/UL — SIGNIFICANT CHANGE UP (ref 3.8–10.5)
WBC # FLD AUTO: 5.9 K/UL — SIGNIFICANT CHANGE UP (ref 3.8–10.5)
WBC # FLD AUTO: 7.7 K/UL — SIGNIFICANT CHANGE UP (ref 3.8–10.5)
WBC # FLD AUTO: 7.7 K/UL — SIGNIFICANT CHANGE UP (ref 3.8–10.5)

## 2018-01-31 PROCEDURE — 33533 CABG ARTERIAL SINGLE: CPT

## 2018-01-31 PROCEDURE — 99292 CRITICAL CARE ADDL 30 MIN: CPT

## 2018-01-31 PROCEDURE — 71046 X-RAY EXAM CHEST 2 VIEWS: CPT | Mod: 26

## 2018-01-31 PROCEDURE — 93010 ELECTROCARDIOGRAM REPORT: CPT

## 2018-01-31 PROCEDURE — 99291 CRITICAL CARE FIRST HOUR: CPT

## 2018-01-31 PROCEDURE — 76998 US GUIDE INTRAOP: CPT | Mod: 26,59

## 2018-01-31 PROCEDURE — 71045 X-RAY EXAM CHEST 1 VIEW: CPT | Mod: 26,59

## 2018-01-31 RX ORDER — FENTANYL CITRATE 50 UG/ML
25 INJECTION INTRAVENOUS ONCE
Qty: 0 | Refills: 0 | Status: DISCONTINUED | OUTPATIENT
Start: 2018-01-31 | End: 2018-01-31

## 2018-01-31 RX ORDER — MAGNESIUM SULFATE 500 MG/ML
2 VIAL (ML) INJECTION ONCE
Qty: 0 | Refills: 0 | Status: COMPLETED | OUTPATIENT
Start: 2018-01-31 | End: 2018-01-31

## 2018-01-31 RX ORDER — ALBUMIN HUMAN 25 %
250 VIAL (ML) INTRAVENOUS ONCE
Qty: 0 | Refills: 0 | Status: COMPLETED | OUTPATIENT
Start: 2018-01-31 | End: 2018-01-31

## 2018-01-31 RX ORDER — MEPERIDINE HYDROCHLORIDE 50 MG/ML
25 INJECTION INTRAMUSCULAR; INTRAVENOUS; SUBCUTANEOUS ONCE
Qty: 0 | Refills: 0 | Status: DISCONTINUED | OUTPATIENT
Start: 2018-01-31 | End: 2018-01-31

## 2018-01-31 RX ORDER — INSULIN HUMAN 100 [IU]/ML
1 INJECTION, SOLUTION SUBCUTANEOUS
Qty: 50 | Refills: 0 | Status: DISCONTINUED | OUTPATIENT
Start: 2018-01-31 | End: 2018-02-01

## 2018-01-31 RX ORDER — HEPARIN SODIUM 5000 [USP'U]/ML
5000 INJECTION INTRAVENOUS; SUBCUTANEOUS EVERY 8 HOURS
Qty: 0 | Refills: 0 | Status: DISCONTINUED | OUTPATIENT
Start: 2018-01-31 | End: 2018-01-31

## 2018-01-31 RX ORDER — POTASSIUM CHLORIDE 20 MEQ
20 PACKET (EA) ORAL ONCE
Qty: 0 | Refills: 0 | Status: COMPLETED | OUTPATIENT
Start: 2018-01-31 | End: 2018-01-31

## 2018-01-31 RX ORDER — PANTOPRAZOLE SODIUM 20 MG/1
40 TABLET, DELAYED RELEASE ORAL DAILY
Qty: 0 | Refills: 0 | Status: DISCONTINUED | OUTPATIENT
Start: 2018-01-31 | End: 2018-01-31

## 2018-01-31 RX ORDER — SODIUM CHLORIDE 9 MG/ML
1000 INJECTION, SOLUTION INTRAVENOUS
Qty: 0 | Refills: 0 | Status: DISCONTINUED | OUTPATIENT
Start: 2018-01-31 | End: 2018-02-08

## 2018-01-31 RX ORDER — HYDRALAZINE HCL 50 MG
10 TABLET ORAL ONCE
Qty: 0 | Refills: 0 | Status: COMPLETED | OUTPATIENT
Start: 2018-01-31 | End: 2018-01-31

## 2018-01-31 RX ORDER — CEFAZOLIN SODIUM 1 G
2000 VIAL (EA) INJECTION EVERY 12 HOURS
Qty: 0 | Refills: 0 | Status: DISCONTINUED | OUTPATIENT
Start: 2018-01-31 | End: 2018-02-01

## 2018-01-31 RX ORDER — ASPIRIN/CALCIUM CARB/MAGNESIUM 324 MG
81 TABLET ORAL DAILY
Qty: 0 | Refills: 0 | Status: DISCONTINUED | OUTPATIENT
Start: 2018-01-31 | End: 2018-01-31

## 2018-01-31 RX ORDER — BUPIVACAINE 13.3 MG/ML
20 INJECTION, SUSPENSION, LIPOSOMAL INFILTRATION ONCE
Qty: 0 | Refills: 0 | Status: DISCONTINUED | OUTPATIENT
Start: 2018-01-31 | End: 2018-01-31

## 2018-01-31 RX ORDER — DEXTROSE 50 % IN WATER 50 %
25 SYRINGE (ML) INTRAVENOUS
Qty: 0 | Refills: 0 | Status: DISCONTINUED | OUTPATIENT
Start: 2018-01-31 | End: 2018-02-01

## 2018-01-31 RX ORDER — DEXMEDETOMIDINE HYDROCHLORIDE IN 0.9% SODIUM CHLORIDE 4 UG/ML
0.5 INJECTION INTRAVENOUS
Qty: 200 | Refills: 0 | Status: DISCONTINUED | OUTPATIENT
Start: 2018-01-31 | End: 2018-02-05

## 2018-01-31 RX ORDER — FENTANYL CITRATE 50 UG/ML
25 INJECTION INTRAVENOUS EVERY 6 HOURS
Qty: 0 | Refills: 0 | Status: DISCONTINUED | OUTPATIENT
Start: 2018-01-31 | End: 2018-02-05

## 2018-01-31 RX ORDER — DEXTROSE 50 % IN WATER 50 %
50 SYRINGE (ML) INTRAVENOUS
Qty: 0 | Refills: 0 | Status: DISCONTINUED | OUTPATIENT
Start: 2018-01-31 | End: 2018-02-01

## 2018-01-31 RX ORDER — ACETAMINOPHEN 500 MG
1000 TABLET ORAL ONCE
Qty: 0 | Refills: 0 | Status: COMPLETED | OUTPATIENT
Start: 2018-01-31 | End: 2018-01-31

## 2018-01-31 RX ADMIN — Medication 100 MILLIGRAM(S): at 05:50

## 2018-01-31 RX ADMIN — Medication 1 DROP(S): at 05:51

## 2018-01-31 RX ADMIN — PIPERACILLIN AND TAZOBACTAM 200 GRAM(S): 4; .5 INJECTION, POWDER, LYOPHILIZED, FOR SOLUTION INTRAVENOUS at 11:33

## 2018-01-31 RX ADMIN — Medication 125 MILLILITER(S): at 21:42

## 2018-01-31 RX ADMIN — Medication 12.5 MILLIGRAM(S): at 05:50

## 2018-01-31 RX ADMIN — FENTANYL CITRATE 25 MICROGRAM(S): 50 INJECTION INTRAVENOUS at 19:00

## 2018-01-31 RX ADMIN — Medication 10 MILLIGRAM(S): at 00:21

## 2018-01-31 RX ADMIN — Medication 125 MILLILITER(S): at 21:37

## 2018-01-31 RX ADMIN — Medication 1 DROP(S): at 06:00

## 2018-01-31 RX ADMIN — Medication 100 MILLIEQUIVALENT(S): at 19:00

## 2018-01-31 RX ADMIN — PIPERACILLIN AND TAZOBACTAM 200 GRAM(S): 4; .5 INJECTION, POWDER, LYOPHILIZED, FOR SOLUTION INTRAVENOUS at 21:49

## 2018-01-31 RX ADMIN — Medication 12.5 MILLIGRAM(S): at 11:33

## 2018-01-31 RX ADMIN — FENTANYL CITRATE 25 MICROGRAM(S): 50 INJECTION INTRAVENOUS at 18:30

## 2018-01-31 RX ADMIN — SODIUM CHLORIDE 3 MILLILITER(S): 9 INJECTION INTRAMUSCULAR; INTRAVENOUS; SUBCUTANEOUS at 21:20

## 2018-01-31 RX ADMIN — Medication 1000 MILLIGRAM(S): at 22:54

## 2018-01-31 RX ADMIN — PIPERACILLIN AND TAZOBACTAM 200 GRAM(S): 4; .5 INJECTION, POWDER, LYOPHILIZED, FOR SOLUTION INTRAVENOUS at 05:50

## 2018-01-31 RX ADMIN — Medication 100 MILLIGRAM(S): at 21:42

## 2018-01-31 RX ADMIN — Medication 100 MILLIEQUIVALENT(S): at 21:45

## 2018-01-31 RX ADMIN — HEPARIN SODIUM 5000 UNIT(S): 5000 INJECTION INTRAVENOUS; SUBCUTANEOUS at 05:50

## 2018-01-31 RX ADMIN — Medication 81 MILLIGRAM(S): at 11:33

## 2018-01-31 RX ADMIN — CHLORHEXIDINE GLUCONATE 1 APPLICATION(S): 213 SOLUTION TOPICAL at 05:52

## 2018-01-31 RX ADMIN — Medication 10 MILLIGRAM(S): at 05:51

## 2018-01-31 RX ADMIN — SODIUM CHLORIDE 3 MILLILITER(S): 9 INJECTION INTRAMUSCULAR; INTRAVENOUS; SUBCUTANEOUS at 05:51

## 2018-01-31 RX ADMIN — Medication 1 DROP(S): at 23:05

## 2018-01-31 RX ADMIN — HEPARIN SODIUM 5000 UNIT(S): 5000 INJECTION INTRAVENOUS; SUBCUTANEOUS at 21:44

## 2018-01-31 RX ADMIN — Medication 50 GRAM(S): at 21:45

## 2018-01-31 RX ADMIN — Medication 12.5 MILLIGRAM(S): at 00:07

## 2018-01-31 RX ADMIN — Medication 400 MILLIGRAM(S): at 22:54

## 2018-01-31 NOTE — PROGRESS NOTE ADULT - SUBJECTIVE AND OBJECTIVE BOX
CTICU  CRITICAL  CARE  attending     Hand off received 					   Pertinent clinical, laboratory, radiographic, hemodynamic, echocardiographic, respiratory data, microbiologic data and chart were reviewed and analyzed frequently throughout the course of the day and night  Patient seen and examined with CTS/ SH attending at bedside  This is an 85 year old male, a poor historian with hypertension, hypercholesterolemia, BPH, s/p AAA repair,  BIBA to [facility] after feeling lightheaded and fell at home on Friday night. Lab work revealed troponin 23, no acute changes on EKG seen, NSTEMI, patient was subsequently loaded with aspirin and plavix. Cardiac Cath done showed pLAD 80% stenosis, mCirc diffuse 80% stenosis, OM1 99% in the third proximal segment, pRCA 100% stenosis, EF 50% apical hypokinesis, apical septal hypokinesis.   Patient denies, syncope, chest pain, SOB, Orthopnea, palpitation, nausea, vomiting.  Blood culture sent was positive for gram negative rods. Patient was given a dose of Zosyn 3.375mg  before his transfer to [facility] ICU on heparin infusion.      FAMILY HISTORY:  Family history of heart disease (Father)  PAST MEDICAL & SURGICAL HISTORY:  Hemorrhoids  Hypercholesterolemia  Benign prostate hyperplasia  Hypertension  History of tonsillectomy  Status post cataract extraction: bilateral eyes  AAA (abdominal aortic aneurysm)    Patient is a 85y old  Male who presents with a chief complaint of 3 vessel CAD (27 Jan 2018 23:05)      14 system review was unremarkable  acute changes include acute respiratory failure  Vital signs, hemodynamic and respiratory parameters were reviewed from the bedside nursing flow sheet.  ICU Vital Signs Last 24 Hrs  T(C): 36 (31 Jan 2018 20:56), Max: 37.3 (31 Jan 2018 00:14)  T(F): 96.8 (31 Jan 2018 20:56), Max: 99.2 (31 Jan 2018 00:14)  HR: 58 (31 Jan 2018 21:00) (50 - 81)  BP: 158/67 (31 Jan 2018 17:30) (158/67 - 190/75)  BP(mean): 93 (31 Jan 2018 17:30) (91 - 139)  ABP: 156/48 (31 Jan 2018 21:00) (156/48 - 210/64)  ABP(mean): 88 (31 Jan 2018 21:00) (86 - 110)  RR: 16 (31 Jan 2018 21:00) (11 - 21)  SpO2: 100% (31 Jan 2018 21:00) (96% - 100%)    Adult Advanced Hemodynamics Last 24 Hrs  CVP(mm Hg): --  CVP(cm H2O): --  CO: --  CI: --  PA: --  PA(mean): --  PCWP: --  SVR: --  SVRI: --  PVR: --  PVRI: --, ABG - ( 31 Jan 2018 20:42 )  pH: 7.33  /  pCO2: 44    /  pO2: 180   / HCO3: 22    / Base Excess: -3.4  /  SaO2: 99                  Intake and output was reviewed and the fluid balance was calculated  Daily Height in cm: 172.72 (31 Jan 2018 07:03)    Daily   I&O's Summary    30 Jan 2018 07:01  -  31 Jan 2018 07:00  --------------------------------------------------------  IN: 925 mL / OUT: 1390 mL / NET: -465 mL    31 Jan 2018 07:01  -  31 Jan 2018 21:58  --------------------------------------------------------  IN: 2065.7 mL / OUT: 1310 mL / NET: 755.7 mL        All lines and drain sites were assessed  Glycemic trend was reviewed CAPILLARY BLOOD GLUCOSE      POCT Blood Glucose.: 114 mg/dL (31 Jan 2018 20:37)    NEURO: No acute change in mental status.  CVS: S1, S2, No S3.  RESPIRATORY: Auscultation of the chest reveals equal breath sounds.  GI: Abdomen is soft. No tenderness. + Bowel sounds.  Extremities are warm and well perfused  Wounds appear clean and unremarkable  Antibiotics are perioperative cefazolin.    labs  CBC Full  -  ( 31 Jan 2018 21:01 )  WBC Count : 7.7 K/uL  Hemoglobin : 11.1 g/dL  Hematocrit : 33.5 %  Platelet Count - Automated : 119 K/uL  Mean Cell Volume : 88.4 fL  Mean Cell Hemoglobin : 29.3 pg  Mean Cell Hemoglobin Concentration : 33.1 g/dL  Auto Neutrophil # : x  Auto Lymphocyte # : x  Auto Monocyte # : x  Auto Eosinophil # : x  Auto Basophil # : x  Auto Neutrophil % : x  Auto Lymphocyte % : x  Auto Monocyte % : x  Auto Eosinophil % : x  Auto Basophil % : x    01-31    141  |  106  |  30<H>  ----------------------------<  140<H>  3.8   |  22  |  1.76<H>    Ca    9.0      31 Jan 2018 21:01  Phos  3.9     01-31  Mg     1.9     01-31    TPro  5.8<L>  /  Alb  3.1<L>  /  TBili  0.5  /  DBili  x   /  AST  38  /  ALT  30  /  AlkPhos  86  01-31    PT/INR - ( 31 Jan 2018 21:01 )   PT: 12.8 sec;   INR: 1.15          PTT - ( 31 Jan 2018 21:01 )  PTT:36.6 sec  The current medications were reviewed   MEDICATIONS  (STANDING):  acetaminophen  IVPB. 1000 milliGRAM(s) IV Intermittent once  aspirin enteric coated 81 milliGRAM(s) Oral daily  atorvastatin 80 milliGRAM(s) Oral at bedtime  ceFAZolin   IVPB 2000 milliGRAM(s) IV Intermittent every 12 hours  dexmedetomidine Infusion 0.5 MICROgram(s)/kG/Hr (10.9 mL/Hr) IV Continuous <Continuous>  dextrose 50% Injectable 50 milliLiter(s) IV Push every 15 minutes  dextrose 50% Injectable 25 milliLiter(s) IV Push every 15 minutes  docusate sodium 100 milliGRAM(s) Oral two times a day  doxazosin 4 milliGRAM(s) Oral at bedtime  heparin  Injectable 5000 Unit(s) SubCutaneous every 8 hours  insulin Infusion 1 Unit(s)/Hr (1 mL/Hr) IV Continuous <Continuous>  ketorolac 0.5% Ophthalmic Solution 1 Drop(s) Right EYE three times a day  ofloxacin 0.3% Solution 1 Drop(s) Right EYE three times a day  pantoprazole    Tablet 40 milliGRAM(s) Oral before breakfast  piperacillin/tazobactam IVPB. 3.375 Gram(s) IV Intermittent every 6 hours  polyethylene glycol 3350 17 Gram(s) Oral daily  prednisoLONE  forte 1% Suspension 1 Drop(s) Right EYE three times a day  senna 2 Tablet(s) Oral at bedtime  sodium chloride 0.45%. 1000 milliLiter(s) (10 mL/Hr) IV Continuous <Continuous>  sodium chloride 0.9% lock flush 3 milliLiter(s) IV Push every 8 hours  sodium chloride 0.9%. 1000 milliLiter(s) (65 mL/Hr) IV Continuous <Continuous>    MEDICATIONS  (PRN):  acetaminophen   Tablet. 650 milliGRAM(s) Oral every 6 hours PRN Mild Pain (1 - 3)  fentaNYL    Injectable 25 MICROGram(s) IV Push every 6 hours PRN Severe Pain (7 - 10)       PROBLEM LIST/ ASSESSMENT:  HEALTH ISSUES - PROBLEM Dx:        Patient is a 85y old  Male who presents with  3 vessel CAD.  S/P CABG.  Hemodynamically stable.  Mild metabolic acidosis.  Fair urine output.  Good oxygenation.         My plan includes :  Close hemodynamic, ventilatory and drain monitoring and management.  D/C lines in AM.  Monitor for arrhythmias and monitor parameters for organ perfusion  Monitor neurologic status  Head of the bed should remain elevated to 45 deg .   Chest PT and IS will be encouraged  Monitor adequacy of oxygenation and ventilation and attempt to wean oxygen  Nutritional goals will be met using po eventually , ensure adequate caloric intake and monitor  the same  Stress ulcer and VTE prophylaxis will be achieved    Glycemic control is satisfactory  Electrolytes have been repleted as necessary and wound care has been carried out. Pain control has been achieved.   Aggressive  physical therapy and early mobility and ambulation goals will be met   The family was updated about the course and plan  CRITICAL CARE TIME SPENT in evaluation and management, reassessments, review and interpretation of labs and x-rays, ventilator and hemodynamic management, formulating a plan and coordinating care: ___30____ MIN.  Time does not include procedural time.  CTICU ATTENDING     					    Kevin Hussein MD

## 2018-01-31 NOTE — PROGRESS NOTE ADULT - SUBJECTIVE AND OBJECTIVE BOX
CTICU  CRITICAL  CARE  attending     Hand off received 					   Pertinent clinical, laboratory, radiographic, hemodynamic, echocardiographic, respiratory data, microbiologic data and chart were reviewed and analyzed frequently throughout the course of the day and night  Patient seen and examined with CTS/ SH attending at bedside  Pt is a 85y , Male, HEALTH ISSUES - PROBLEM Dx:      , FAMILY HISTORY:  Family history of heart disease (Father)  PAST MEDICAL & SURGICAL HISTORY:  Hemorrhoids  Hypercholesterolemia  Benign prostate hyperplasia  Hypertension  History of tonsillectomy  Status post cataract extraction: bilateral eyes  AAA (abdominal aortic aneurysm)    Patient is a 85y old  Male who presents with a chief complaint of 3 vessel CAD (27 Jan 2018 23:05)      14 system review was unremarkable  acute changes include acute respiratory failure  Vital signs, hemodynamic and respiratory parameters were reviewed from the bedside nursing flowsheet.  ICU Vital Signs Last 24 Hrs  T(C): 36 (31 Jan 2018 20:56), Max: 37.3 (31 Jan 2018 00:14)  T(F): 96.8 (31 Jan 2018 20:56), Max: 99.2 (31 Jan 2018 00:14)  HR: 58 (31 Jan 2018 21:00) (50 - 81)  BP: 158/67 (31 Jan 2018 17:30) (158/67 - 190/75)  BP(mean): 93 (31 Jan 2018 17:30) (91 - 139)  ABP: 156/48 (31 Jan 2018 21:00) (156/48 - 210/64)  ABP(mean): 88 (31 Jan 2018 21:00) (86 - 110)  RR: 16 (31 Jan 2018 21:00) (11 - 21)  SpO2: 100% (31 Jan 2018 21:00) (96% - 100%)    Adult Advanced Hemodynamics Last 24 Hrs  CVP(mm Hg): --  CVP(cm H2O): --  CO: --  CI: --  PA: --  PA(mean): --  PCWP: --  SVR: --  SVRI: --  PVR: --  PVRI: --, ABG - ( 31 Jan 2018 20:42 )  pH: 7.33  /  pCO2: 44    /  pO2: 180   / HCO3: 22    / Base Excess: -3.4  /  SaO2: 99                  Intake and output was reviewed and the fluid balance was calculated  Daily Height in cm: 172.72 (31 Jan 2018 07:03)    Daily   I&O's Summary    30 Jan 2018 07:01  -  31 Jan 2018 07:00  --------------------------------------------------------  IN: 925 mL / OUT: 1390 mL / NET: -465 mL    31 Jan 2018 07:01  -  31 Jan 2018 21:14  --------------------------------------------------------  IN: 2040.4 mL / OUT: 1250 mL / NET: 790.4 mL        All lines and drain sites were assessed  [facility]      POCT Blood Glucose.: 114 mg/dL (31 Jan 2018 20:37)    No acute change in mental status  Auscultation of the chest reveals equal bs  Abdomen is soft  Extremities are warm and well perfused  Wounds appear clean and unremarkable  Antibiotics are periop    labs  CBC Full  -  ( 31 Jan 2018 21:01 )  WBC Count : 7.7 K/uL  Hemoglobin : 11.1 g/dL  Hematocrit : 33.5 %  Platelet Count - Automated : 119 K/uL  Mean Cell Volume : 88.4 fL  Mean Cell Hemoglobin : 29.3 pg  Mean Cell Hemoglobin Concentration : 33.1 g/dL  Auto Neutrophil # : x  Auto Lymphocyte # : x  Auto Monocyte # : x  Auto Eosinophil # : x  Auto Basophil # : x  Auto Neutrophil % : x  Auto Lymphocyte % : x  Auto Monocyte % : x  Auto Eosinophil % : x  Auto Basophil % : x    01-31    143  |  108  |  31<H>  ----------------------------<  137<H>  3.6   |  22  |  1.92<H>    Ca    9.1      31 Jan 2018 17:12  Phos  3.4     01-31  Mg     2.1     01-31    TPro  6.5  /  Alb  3.3  /  TBili  0.6  /  DBili  x   /  AST  40  /  ALT  31  /  AlkPhos  102  01-31    PT/INR - ( 31 Jan 2018 17:12 )   PT: 13.3 sec;   INR: 1.19          PTT - ( 31 Jan 2018 17:12 )  PTT:35.7 sec  The current medications were reviewed   MEDICATIONS  (STANDING):  acetaminophen  IVPB. 1000 milliGRAM(s) IV Intermittent once  albumin human  5% IVPB 250 milliLiter(s) IV Intermittent once  albumin human  5% IVPB 250 milliLiter(s) IV Intermittent once  aspirin enteric coated 81 milliGRAM(s) Oral daily  atorvastatin 80 milliGRAM(s) Oral at bedtime  ceFAZolin   IVPB 2000 milliGRAM(s) IV Intermittent every 12 hours  dexmedetomidine Infusion 0.5 MICROgram(s)/kG/Hr (10.9 mL/Hr) IV Continuous <Continuous>  dextrose 50% Injectable 50 milliLiter(s) IV Push every 15 minutes  dextrose 50% Injectable 25 milliLiter(s) IV Push every 15 minutes  docusate sodium 100 milliGRAM(s) Oral two times a day  doxazosin 4 milliGRAM(s) Oral at bedtime  heparin  Injectable 5000 Unit(s) SubCutaneous every 8 hours  insulin Infusion 1 Unit(s)/Hr (1 mL/Hr) IV Continuous <Continuous>  ketorolac 0.5% Ophthalmic Solution 1 Drop(s) Right EYE three times a day  ofloxacin 0.3% Solution 1 Drop(s) Right EYE three times a day  pantoprazole    Tablet 40 milliGRAM(s) Oral before breakfast  piperacillin/tazobactam IVPB. 3.375 Gram(s) IV Intermittent every 6 hours  polyethylene glycol 3350 17 Gram(s) Oral daily  prednisoLONE  forte 1% Suspension 1 Drop(s) Right EYE three times a day  senna 2 Tablet(s) Oral at bedtime  sodium chloride 0.45%. 1000 milliLiter(s) (10 mL/Hr) IV Continuous <Continuous>  sodium chloride 0.9% lock flush 3 milliLiter(s) IV Push every 8 hours  sodium chloride 0.9%. 1000 milliLiter(s) (65 mL/Hr) IV Continuous <Continuous>    MEDICATIONS  (PRN):  acetaminophen   Tablet. 650 milliGRAM(s) Oral every 6 hours PRN Mild Pain (1 - 3)  fentaNYL    Injectable 25 MICROGram(s) IV Push every 6 hours PRN Severe Pain (7 - 10)       PROBLEM LIST/ ASSESSMENT:  HEALTH ISSUES - PROBLEM Dx:      ,   Patient is a 85y old  Male who presents with a chief complaint of 3 vessel CAD (27 Jan 2018 23:05)     s/p ohs  acute changes include acute respiratory failure    My plan includes :  close hemodynamic, ventilatory and drain monitoring and management per post op routine    Monitor for arrhythmias and monitor parameters for organ perfusion  monitor neurologic status  Head of the bed should remain elevated to 45 deg .   chest PT and IS will be encouraged  monitor adequacy of oxygenation and ventilation and attempt to wean oxygen  Nutritional goals will be met using po eventually , ensure adequate caloric intake and montior the same  Stress ulcer and VTE prophylaxis will be achieved    Glycemic control is satisfactory  Electrolytes have been repleted as necessary and wound care has been carried out. Pain control has been achieved.   agressive physical therapy and early mobility and ambulation goals will be met   The family was updated about the course and plan  CRITICAL CARE TIME SPENT in evaluation and management, reassessments, review and interpretation of labs and x-rays, ventilator and hemodynamic management, formulating a plan and coordinating care: ___90____ MIN.  Time does not include procedural time.  CTICU ATTENDING     					    Walker Soto MD

## 2018-01-31 NOTE — BRIEF OPERATIVE NOTE - PRE-OP DX
Coronary artery disease involving native coronary artery with other forms of angina pectoris, unspecified whether native or transplanted heart  01/31/2018    Active  Jannie Raygoza

## 2018-01-31 NOTE — BRIEF OPERATIVE NOTE - PROCEDURE
<<-----Click on this checkbox to enter Procedure CABG  01/31/2018    Active  MODONOGUE CABG  01/31/2018  Jannie Lynch

## 2018-02-01 PROBLEM — Z12.11 ENCOUNTER FOR SCREENING COLONOSCOPY: Status: RESOLVED | Noted: 2017-07-27 | Resolved: 2018-02-01

## 2018-02-01 PROBLEM — Z86.79 HISTORY OF HYPERTENSION: Status: RESOLVED | Noted: 2018-02-01 | Resolved: 2018-02-01

## 2018-02-01 PROBLEM — Z87.438 HISTORY OF BPH: Status: RESOLVED | Noted: 2018-02-01 | Resolved: 2018-02-01

## 2018-02-01 PROBLEM — Z87.19 HISTORY OF DUODENAL ULCER: Status: RESOLVED | Noted: 2017-08-08 | Resolved: 2018-02-01

## 2018-02-01 PROBLEM — Z09 POSTOP CHECK: Status: ACTIVE | Noted: 2018-02-01

## 2018-02-01 PROBLEM — K92.1 GASTROINTESTINAL HEMORRHAGE WITH MELENA: Status: RESOLVED | Noted: 2017-07-27 | Resolved: 2018-02-01

## 2018-02-01 PROBLEM — Z87.898 HISTORY OF ABNORMAL WEIGHT LOSS: Status: RESOLVED | Noted: 2017-07-27 | Resolved: 2018-02-01

## 2018-02-01 PROBLEM — I25.2 HISTORY OF NON-ST ELEVATION MYOCARDIAL INFARCTION (NSTEMI): Status: RESOLVED | Noted: 2018-02-01 | Resolved: 2018-02-01

## 2018-02-01 PROBLEM — N39.3 URINARY STRESS INCONTINENCE, MALE: Status: RESOLVED | Noted: 2017-07-27 | Resolved: 2018-02-01

## 2018-02-01 LAB
ALBUMIN SERPL ELPH-MCNC: 3.2 G/DL — LOW (ref 3.3–5)
ALBUMIN SERPL ELPH-MCNC: 3.3 G/DL — SIGNIFICANT CHANGE UP (ref 3.3–5)
ALP SERPL-CCNC: 74 U/L — SIGNIFICANT CHANGE UP (ref 40–120)
ALP SERPL-CCNC: 75 U/L — SIGNIFICANT CHANGE UP (ref 40–120)
ALP SERPL-CCNC: 75 U/L — SIGNIFICANT CHANGE UP (ref 40–120)
ALP SERPL-CCNC: 92 U/L — SIGNIFICANT CHANGE UP (ref 40–120)
ALT FLD-CCNC: 23 U/L — SIGNIFICANT CHANGE UP (ref 10–45)
ALT FLD-CCNC: 27 U/L — SIGNIFICANT CHANGE UP (ref 10–45)
ALT FLD-CCNC: 27 U/L — SIGNIFICANT CHANGE UP (ref 10–45)
ALT FLD-CCNC: 29 U/L — SIGNIFICANT CHANGE UP (ref 10–45)
ANION GAP SERPL CALC-SCNC: 13 MMOL/L — SIGNIFICANT CHANGE UP (ref 5–17)
ANION GAP SERPL CALC-SCNC: 13 MMOL/L — SIGNIFICANT CHANGE UP (ref 5–17)
ANION GAP SERPL CALC-SCNC: 14 MMOL/L — SIGNIFICANT CHANGE UP (ref 5–17)
ANION GAP SERPL CALC-SCNC: 16 MMOL/L — SIGNIFICANT CHANGE UP (ref 5–17)
APTT BLD: 29 SEC — SIGNIFICANT CHANGE UP (ref 27.5–37.4)
APTT BLD: 30.1 SEC — SIGNIFICANT CHANGE UP (ref 27.5–37.4)
APTT BLD: 30.7 SEC — SIGNIFICANT CHANGE UP (ref 27.5–37.4)
APTT BLD: 38.4 SEC — HIGH (ref 27.5–37.4)
AST SERPL-CCNC: 35 U/L — SIGNIFICANT CHANGE UP (ref 10–40)
AST SERPL-CCNC: 36 U/L — SIGNIFICANT CHANGE UP (ref 10–40)
AST SERPL-CCNC: 37 U/L — SIGNIFICANT CHANGE UP (ref 10–40)
AST SERPL-CCNC: 40 U/L — SIGNIFICANT CHANGE UP (ref 10–40)
BILIRUB SERPL-MCNC: 0.5 MG/DL — SIGNIFICANT CHANGE UP (ref 0.2–1.2)
BILIRUB SERPL-MCNC: 0.6 MG/DL — SIGNIFICANT CHANGE UP (ref 0.2–1.2)
BUN SERPL-MCNC: 25 MG/DL — HIGH (ref 7–23)
BUN SERPL-MCNC: 25 MG/DL — HIGH (ref 7–23)
BUN SERPL-MCNC: 27 MG/DL — HIGH (ref 7–23)
BUN SERPL-MCNC: 27 MG/DL — HIGH (ref 7–23)
CALCIUM SERPL-MCNC: 8.9 MG/DL — SIGNIFICANT CHANGE UP (ref 8.4–10.5)
CALCIUM SERPL-MCNC: 8.9 MG/DL — SIGNIFICANT CHANGE UP (ref 8.4–10.5)
CALCIUM SERPL-MCNC: 9.1 MG/DL — SIGNIFICANT CHANGE UP (ref 8.4–10.5)
CALCIUM SERPL-MCNC: 9.1 MG/DL — SIGNIFICANT CHANGE UP (ref 8.4–10.5)
CHLORIDE SERPL-SCNC: 105 MMOL/L — SIGNIFICANT CHANGE UP (ref 96–108)
CHLORIDE SERPL-SCNC: 106 MMOL/L — SIGNIFICANT CHANGE UP (ref 96–108)
CHLORIDE SERPL-SCNC: 106 MMOL/L — SIGNIFICANT CHANGE UP (ref 96–108)
CHLORIDE SERPL-SCNC: 108 MMOL/L — SIGNIFICANT CHANGE UP (ref 96–108)
CO2 SERPL-SCNC: 19 MMOL/L — LOW (ref 22–31)
CO2 SERPL-SCNC: 20 MMOL/L — LOW (ref 22–31)
CO2 SERPL-SCNC: 21 MMOL/L — LOW (ref 22–31)
CO2 SERPL-SCNC: 23 MMOL/L — SIGNIFICANT CHANGE UP (ref 22–31)
CREAT SERPL-MCNC: 1.56 MG/DL — HIGH (ref 0.5–1.3)
CREAT SERPL-MCNC: 1.59 MG/DL — HIGH (ref 0.5–1.3)
CREAT SERPL-MCNC: 1.72 MG/DL — HIGH (ref 0.5–1.3)
CREAT SERPL-MCNC: 1.85 MG/DL — HIGH (ref 0.5–1.3)
GAS PNL BLDA: SIGNIFICANT CHANGE UP
GLUCOSE BLDC GLUCOMTR-MCNC: 107 MG/DL — HIGH (ref 70–99)
GLUCOSE BLDC GLUCOMTR-MCNC: 108 MG/DL — HIGH (ref 70–99)
GLUCOSE BLDC GLUCOMTR-MCNC: 110 MG/DL — HIGH (ref 70–99)
GLUCOSE BLDC GLUCOMTR-MCNC: 134 MG/DL — HIGH (ref 70–99)
GLUCOSE BLDC GLUCOMTR-MCNC: 179 MG/DL — HIGH (ref 70–99)
GLUCOSE SERPL-MCNC: 115 MG/DL — HIGH (ref 70–99)
GLUCOSE SERPL-MCNC: 134 MG/DL — HIGH (ref 70–99)
GLUCOSE SERPL-MCNC: 138 MG/DL — HIGH (ref 70–99)
GLUCOSE SERPL-MCNC: 222 MG/DL — HIGH (ref 70–99)
HCT VFR BLD CALC: 32.7 % — LOW (ref 39–50)
HCT VFR BLD CALC: 33 % — LOW (ref 39–50)
HCT VFR BLD CALC: 33.9 % — LOW (ref 39–50)
HCT VFR BLD CALC: 35.2 % — LOW (ref 39–50)
HGB BLD-MCNC: 11 G/DL — LOW (ref 13–17)
HGB BLD-MCNC: 11 G/DL — LOW (ref 13–17)
HGB BLD-MCNC: 11.2 G/DL — LOW (ref 13–17)
HGB BLD-MCNC: 11.2 G/DL — LOW (ref 13–17)
INR BLD: 1.19 — HIGH (ref 0.88–1.16)
INR BLD: 1.19 — HIGH (ref 0.88–1.16)
INR BLD: 1.28 — HIGH (ref 0.88–1.16)
INR BLD: 1.33 — HIGH (ref 0.88–1.16)
LACTATE SERPL-SCNC: 0.6 MMOL/L — SIGNIFICANT CHANGE UP (ref 0.5–2)
LACTATE SERPL-SCNC: 0.6 MMOL/L — SIGNIFICANT CHANGE UP (ref 0.5–2)
LACTATE SERPL-SCNC: 0.9 MMOL/L — SIGNIFICANT CHANGE UP (ref 0.5–2)
MAGNESIUM SERPL-MCNC: 2.1 MG/DL — SIGNIFICANT CHANGE UP (ref 1.6–2.6)
MAGNESIUM SERPL-MCNC: 2.2 MG/DL — SIGNIFICANT CHANGE UP (ref 1.6–2.6)
MAGNESIUM SERPL-MCNC: 2.4 MG/DL — SIGNIFICANT CHANGE UP (ref 1.6–2.6)
MAGNESIUM SERPL-MCNC: 2.4 MG/DL — SIGNIFICANT CHANGE UP (ref 1.6–2.6)
MCHC RBC-ENTMCNC: 28 PG — SIGNIFICANT CHANGE UP (ref 27–34)
MCHC RBC-ENTMCNC: 28.9 PG — SIGNIFICANT CHANGE UP (ref 27–34)
MCHC RBC-ENTMCNC: 29.2 PG — SIGNIFICANT CHANGE UP (ref 27–34)
MCHC RBC-ENTMCNC: 29.2 PG — SIGNIFICANT CHANGE UP (ref 27–34)
MCHC RBC-ENTMCNC: 31.8 G/DL — LOW (ref 32–36)
MCHC RBC-ENTMCNC: 33 G/DL — SIGNIFICANT CHANGE UP (ref 32–36)
MCHC RBC-ENTMCNC: 33.3 G/DL — SIGNIFICANT CHANGE UP (ref 32–36)
MCHC RBC-ENTMCNC: 33.6 G/DL — SIGNIFICANT CHANGE UP (ref 32–36)
MCV RBC AUTO: 86.7 FL — SIGNIFICANT CHANGE UP (ref 80–100)
MCV RBC AUTO: 87.5 FL — SIGNIFICANT CHANGE UP (ref 80–100)
MCV RBC AUTO: 87.6 FL — SIGNIFICANT CHANGE UP (ref 80–100)
MCV RBC AUTO: 88 FL — SIGNIFICANT CHANGE UP (ref 80–100)
PHOSPHATE SERPL-MCNC: 3.1 MG/DL — SIGNIFICANT CHANGE UP (ref 2.5–4.5)
PHOSPHATE SERPL-MCNC: 3.1 MG/DL — SIGNIFICANT CHANGE UP (ref 2.5–4.5)
PHOSPHATE SERPL-MCNC: 3.8 MG/DL — SIGNIFICANT CHANGE UP (ref 2.5–4.5)
PHOSPHATE SERPL-MCNC: 3.9 MG/DL — SIGNIFICANT CHANGE UP (ref 2.5–4.5)
PLATELET # BLD AUTO: 103 K/UL — LOW (ref 150–400)
PLATELET # BLD AUTO: 133 K/UL — LOW (ref 150–400)
PLATELET # BLD AUTO: 138 K/UL — LOW (ref 150–400)
PLATELET # BLD AUTO: 99 K/UL — LOW (ref 150–400)
POTASSIUM SERPL-MCNC: 3.9 MMOL/L — SIGNIFICANT CHANGE UP (ref 3.5–5.3)
POTASSIUM SERPL-MCNC: 4.1 MMOL/L — SIGNIFICANT CHANGE UP (ref 3.5–5.3)
POTASSIUM SERPL-SCNC: 3.9 MMOL/L — SIGNIFICANT CHANGE UP (ref 3.5–5.3)
POTASSIUM SERPL-SCNC: 4.1 MMOL/L — SIGNIFICANT CHANGE UP (ref 3.5–5.3)
PROT SERPL-MCNC: 5.5 G/DL — LOW (ref 6–8.3)
PROT SERPL-MCNC: 5.8 G/DL — LOW (ref 6–8.3)
PROT SERPL-MCNC: 6 G/DL — SIGNIFICANT CHANGE UP (ref 6–8.3)
PROT SERPL-MCNC: 6.2 G/DL — SIGNIFICANT CHANGE UP (ref 6–8.3)
PROTHROM AB SERPL-ACNC: 13.3 SEC — HIGH (ref 9.8–12.7)
PROTHROM AB SERPL-ACNC: 13.3 SEC — HIGH (ref 9.8–12.7)
PROTHROM AB SERPL-ACNC: 14.3 SEC — HIGH (ref 9.8–12.7)
PROTHROM AB SERPL-ACNC: 14.9 SEC — HIGH (ref 9.8–12.7)
RBC # BLD: 3.77 M/UL — LOW (ref 4.2–5.8)
RBC # BLD: 3.77 M/UL — LOW (ref 4.2–5.8)
RBC # BLD: 3.87 M/UL — LOW (ref 4.2–5.8)
RBC # BLD: 4 M/UL — LOW (ref 4.2–5.8)
RBC # FLD: 14.6 % — SIGNIFICANT CHANGE UP (ref 10.3–16.9)
RBC # FLD: 14.8 % — SIGNIFICANT CHANGE UP (ref 10.3–16.9)
SODIUM SERPL-SCNC: 140 MMOL/L — SIGNIFICANT CHANGE UP (ref 135–145)
SODIUM SERPL-SCNC: 141 MMOL/L — SIGNIFICANT CHANGE UP (ref 135–145)
SODIUM SERPL-SCNC: 141 MMOL/L — SIGNIFICANT CHANGE UP (ref 135–145)
SODIUM SERPL-SCNC: 142 MMOL/L — SIGNIFICANT CHANGE UP (ref 135–145)
WBC # BLD: 11.9 K/UL — HIGH (ref 3.8–10.5)
WBC # BLD: 14.1 K/UL — HIGH (ref 3.8–10.5)
WBC # BLD: 7.7 K/UL — SIGNIFICANT CHANGE UP (ref 3.8–10.5)
WBC # BLD: 8.5 K/UL — SIGNIFICANT CHANGE UP (ref 3.8–10.5)
WBC # FLD AUTO: 11.9 K/UL — HIGH (ref 3.8–10.5)
WBC # FLD AUTO: 14.1 K/UL — HIGH (ref 3.8–10.5)
WBC # FLD AUTO: 7.7 K/UL — SIGNIFICANT CHANGE UP (ref 3.8–10.5)
WBC # FLD AUTO: 8.5 K/UL — SIGNIFICANT CHANGE UP (ref 3.8–10.5)

## 2018-02-01 PROCEDURE — 99291 CRITICAL CARE FIRST HOUR: CPT

## 2018-02-01 PROCEDURE — 71045 X-RAY EXAM CHEST 1 VIEW: CPT | Mod: 26

## 2018-02-01 RX ORDER — PANTOPRAZOLE 40 MG/1
40 TABLET, DELAYED RELEASE ORAL TWICE DAILY
Qty: 60 | Refills: 10 | Status: COMPLETED | COMMUNITY
Start: 2017-08-08 | End: 2018-02-01

## 2018-02-01 RX ORDER — DEXTROSE 50 % IN WATER 50 %
25 SYRINGE (ML) INTRAVENOUS ONCE
Qty: 0 | Refills: 0 | Status: DISCONTINUED | OUTPATIENT
Start: 2018-02-01 | End: 2018-02-08

## 2018-02-01 RX ORDER — POTASSIUM CHLORIDE 20 MEQ
20 PACKET (EA) ORAL ONCE
Qty: 0 | Refills: 0 | Status: COMPLETED | OUTPATIENT
Start: 2018-02-01 | End: 2018-02-01

## 2018-02-01 RX ORDER — DEXTROSE 50 % IN WATER 50 %
1 SYRINGE (ML) INTRAVENOUS ONCE
Qty: 0 | Refills: 0 | Status: DISCONTINUED | OUTPATIENT
Start: 2018-02-01 | End: 2018-02-08

## 2018-02-01 RX ORDER — VERAPAMIL HYDROCHLORIDE 240 MG/1
240 TABLET ORAL
Refills: 0 | Status: COMPLETED | COMMUNITY
End: 2018-02-01

## 2018-02-01 RX ORDER — METOPROLOL TARTRATE 50 MG
25 TABLET ORAL
Qty: 0 | Refills: 0 | Status: DISCONTINUED | OUTPATIENT
Start: 2018-02-01 | End: 2018-02-02

## 2018-02-01 RX ORDER — MULTIVIT-MIN/FA/LYCOPEN/LUTEIN .4-300-25
TABLET ORAL
Refills: 0 | Status: COMPLETED | COMMUNITY
End: 2018-02-01

## 2018-02-01 RX ORDER — INSULIN LISPRO 100/ML
VIAL (ML) SUBCUTANEOUS
Qty: 0 | Refills: 0 | Status: DISCONTINUED | OUTPATIENT
Start: 2018-02-01 | End: 2018-02-08

## 2018-02-01 RX ORDER — ALBUMIN HUMAN 25 %
250 VIAL (ML) INTRAVENOUS ONCE
Qty: 0 | Refills: 0 | Status: COMPLETED | OUTPATIENT
Start: 2018-02-01 | End: 2018-02-01

## 2018-02-01 RX ORDER — SODIUM CHLORIDE 9 MG/ML
1000 INJECTION, SOLUTION INTRAVENOUS
Qty: 0 | Refills: 0 | Status: DISCONTINUED | OUTPATIENT
Start: 2018-02-01 | End: 2018-02-08

## 2018-02-01 RX ORDER — ACETAMINOPHEN 500 MG
1000 TABLET ORAL ONCE
Qty: 0 | Refills: 0 | Status: COMPLETED | OUTPATIENT
Start: 2018-02-01 | End: 2018-02-01

## 2018-02-01 RX ORDER — FUROSEMIDE 40 MG
40 TABLET ORAL ONCE
Qty: 0 | Refills: 0 | Status: COMPLETED | OUTPATIENT
Start: 2018-02-01 | End: 2018-02-01

## 2018-02-01 RX ORDER — SODIUM SULFATE, POTASSIUM SULFATE, MAGNESIUM SULFATE 17.5; 3.13; 1.6 G/ML; G/ML; G/ML
17.5-3.13-1.6 SOLUTION, CONCENTRATE ORAL
Qty: 1 | Refills: 0 | Status: COMPLETED | COMMUNITY
Start: 2017-07-27 | End: 2018-02-01

## 2018-02-01 RX ORDER — DEXTROSE 50 % IN WATER 50 %
12.5 SYRINGE (ML) INTRAVENOUS ONCE
Qty: 0 | Refills: 0 | Status: DISCONTINUED | OUTPATIENT
Start: 2018-02-01 | End: 2018-02-08

## 2018-02-01 RX ORDER — LABETALOL HCL 100 MG
10 TABLET ORAL ONCE
Qty: 0 | Refills: 0 | Status: COMPLETED | OUTPATIENT
Start: 2018-02-01 | End: 2018-02-01

## 2018-02-01 RX ORDER — GLUCAGON INJECTION, SOLUTION 0.5 MG/.1ML
1 INJECTION, SOLUTION SUBCUTANEOUS ONCE
Qty: 0 | Refills: 0 | Status: DISCONTINUED | OUTPATIENT
Start: 2018-02-01 | End: 2018-02-08

## 2018-02-01 RX ORDER — HYDRALAZINE HCL 50 MG
10 TABLET ORAL ONCE
Qty: 0 | Refills: 0 | Status: COMPLETED | OUTPATIENT
Start: 2018-02-01 | End: 2018-02-01

## 2018-02-01 RX ORDER — SIMVASTATIN 80 MG/1
80 TABLET, FILM COATED ORAL
Refills: 0 | Status: COMPLETED | COMMUNITY
End: 2018-02-01

## 2018-02-01 RX ADMIN — FENTANYL CITRATE 25 MICROGRAM(S): 50 INJECTION INTRAVENOUS at 22:40

## 2018-02-01 RX ADMIN — HEPARIN SODIUM 5000 UNIT(S): 5000 INJECTION INTRAVENOUS; SUBCUTANEOUS at 14:32

## 2018-02-01 RX ADMIN — Medication 125 MILLILITER(S): at 01:30

## 2018-02-01 RX ADMIN — PIPERACILLIN AND TAZOBACTAM 200 GRAM(S): 4; .5 INJECTION, POWDER, LYOPHILIZED, FOR SOLUTION INTRAVENOUS at 12:04

## 2018-02-01 RX ADMIN — Medication 100 MILLIGRAM(S): at 22:26

## 2018-02-01 RX ADMIN — PIPERACILLIN AND TAZOBACTAM 200 GRAM(S): 4; .5 INJECTION, POWDER, LYOPHILIZED, FOR SOLUTION INTRAVENOUS at 23:46

## 2018-02-01 RX ADMIN — Medication 1 DROP(S): at 05:34

## 2018-02-01 RX ADMIN — Medication 100 MILLIGRAM(S): at 09:20

## 2018-02-01 RX ADMIN — SODIUM CHLORIDE 3 MILLILITER(S): 9 INJECTION INTRAMUSCULAR; INTRAVENOUS; SUBCUTANEOUS at 22:34

## 2018-02-01 RX ADMIN — Medication 10 MILLIGRAM(S): at 20:21

## 2018-02-01 RX ADMIN — Medication 25 MILLIGRAM(S): at 18:53

## 2018-02-01 RX ADMIN — Medication 1 DROP(S): at 05:35

## 2018-02-01 RX ADMIN — Medication 1000 MILLIGRAM(S): at 05:52

## 2018-02-01 RX ADMIN — FENTANYL CITRATE 25 MICROGRAM(S): 50 INJECTION INTRAVENOUS at 22:55

## 2018-02-01 RX ADMIN — HEPARIN SODIUM 5000 UNIT(S): 5000 INJECTION INTRAVENOUS; SUBCUTANEOUS at 05:34

## 2018-02-01 RX ADMIN — Medication 100 MILLIGRAM(S): at 18:04

## 2018-02-01 RX ADMIN — POLYETHYLENE GLYCOL 3350 17 GRAM(S): 17 POWDER, FOR SOLUTION ORAL at 12:12

## 2018-02-01 RX ADMIN — PIPERACILLIN AND TAZOBACTAM 200 GRAM(S): 4; .5 INJECTION, POWDER, LYOPHILIZED, FOR SOLUTION INTRAVENOUS at 05:51

## 2018-02-01 RX ADMIN — PIPERACILLIN AND TAZOBACTAM 200 GRAM(S): 4; .5 INJECTION, POWDER, LYOPHILIZED, FOR SOLUTION INTRAVENOUS at 18:04

## 2018-02-01 RX ADMIN — FENTANYL CITRATE 25 MICROGRAM(S): 50 INJECTION INTRAVENOUS at 03:54

## 2018-02-01 RX ADMIN — ATORVASTATIN CALCIUM 80 MILLIGRAM(S): 80 TABLET, FILM COATED ORAL at 22:30

## 2018-02-01 RX ADMIN — Medication 400 MILLIGRAM(S): at 05:51

## 2018-02-01 RX ADMIN — HEPARIN SODIUM 5000 UNIT(S): 5000 INJECTION INTRAVENOUS; SUBCUTANEOUS at 22:29

## 2018-02-01 RX ADMIN — Medication 1 DROP(S): at 15:59

## 2018-02-01 RX ADMIN — Medication 1 DROP(S): at 22:31

## 2018-02-01 RX ADMIN — Medication 1000 MILLIGRAM(S): at 12:10

## 2018-02-01 RX ADMIN — Medication 20 MILLIEQUIVALENT(S): at 20:42

## 2018-02-01 RX ADMIN — Medication 10 MILLIGRAM(S): at 02:45

## 2018-02-01 RX ADMIN — Medication 400 MILLIGRAM(S): at 11:54

## 2018-02-01 RX ADMIN — SODIUM CHLORIDE 3 MILLILITER(S): 9 INJECTION INTRAMUSCULAR; INTRAVENOUS; SUBCUTANEOUS at 05:32

## 2018-02-01 RX ADMIN — Medication 2: at 12:12

## 2018-02-01 RX ADMIN — Medication 4 MILLIGRAM(S): at 22:31

## 2018-02-01 RX ADMIN — SENNA PLUS 2 TABLET(S): 8.6 TABLET ORAL at 22:31

## 2018-02-01 RX ADMIN — SODIUM CHLORIDE 3 MILLILITER(S): 9 INJECTION INTRAMUSCULAR; INTRAVENOUS; SUBCUTANEOUS at 13:33

## 2018-02-01 RX ADMIN — Medication 40 MILLIGRAM(S): at 12:43

## 2018-02-01 RX ADMIN — Medication 81 MILLIGRAM(S): at 12:04

## 2018-02-01 NOTE — DIETITIAN INITIAL EVALUATION ADULT. - ENERGY NEEDS
Height: 68" Weight: 192lbs, IBW 154lbs+/-10%, %%, BMI - 29.4  IBW used to calculate energy needs due to pt's current body weight exceeding 120% of IBW   Nutrient needs based on [facility] standards of care for maintenance in older adults, protein adjusted 2/2 pressure ulcer and surgery, increased lisa 2/2 suspected wt loss

## 2018-02-01 NOTE — PROGRESS NOTE ADULT - ASSESSMENT
84 yo admitted with NSTEMI in the setting of Ecoli bacteremia  s/p MIDCAB. Pt with pre-op renal insufficiency, episodes of delirium/sundowning since admission     problem   1. CAD s/p MIDCAB  2. E coli bacteremia   3. Acute and CRI   4. Sundowning/delirium     Plan   Neuro -- pain control, trying to avoid narcotics  CVS - BP management.  ASA/Plavix  chest tube management - serous drainage  Pulm - IS/pulm toileting   GI - GI proph/diet   - monitor UOP  Endo - glycemic control  Heme - DVT proph  ID - Zosyn for E coli coverage.        Critical post op.    Critical care time spent 40 min

## 2018-02-01 NOTE — DIETITIAN INITIAL EVALUATION ADULT. - OTHER INFO
Patient is a 85y old  Male who presents with a chief complaint of 3 vessel CAD, s/p MID CAB. Pt with poor PO intake and appettie at this time. Reports decreased PO intake at home 2/2 distaste for meals on wheels options. Pt also reports ~50 lb wt loss yet unsure of time frame. Denies any n/v/d/c. Appears to be in some slight pain/discomfort at the time visit. Not an approrapte candidate for diet edcation at this time Patient is a 85y old  Male who presents with a chief complaint of 3 vessel CAD, s/p MID CAB. Pt with poor PO intake and appettie at this time. Reports decreased PO intake at home 2/2 distaste for meals on wheels options. Pt also reports ~50 lb wt loss yet unsure of time frame. Denies any n/v/d/c. Appears to be in some slight pain/discomfort at the time visit. Not an appropriate candidate for diet education at this time 2/2 confusion. NKFA noted. Unstageable pressure ulcer noted on coccyx.

## 2018-02-01 NOTE — PHYSICAL THERAPY INITIAL EVALUATION ADULT - GENERAL OBSERVATIONS, REHAB EVAL
Pt received seated, +TPM, +CT to suction (removed by RN Tarik prior to ambulation), +R radial a-line, +2L NC O2, +taylor, +telemetry, +pulse ox, +b/l SCDs, +heplock, NAD, agreeable to PT.

## 2018-02-01 NOTE — PROGRESS NOTE ADULT - SUBJECTIVE AND OBJECTIVE BOX
POD #1 s/p MIDCAB    84 yo admitted with NSTEMI in the setting of E coli bacteremia likely from diverticulitis.  LHC with 3 vessel CAD pt s/p CABGX1  Pt with PVD, admitted with CKD.    PMH :  TRIPLE VESSEL DISEASE  NSTEMI  Hemorrhoids  Hypercholesterolemia  Benign prostate hyperplasia  Hypertension  Coronary artery disease involving native coronary artery with other forms of angina pectoris, unspecified whether native or transplanted heart  CABG    ROS no issue   All other systems reviewed and negative.    ICU Vital Signs Last 24 Hrs  T(C): 37.3 (02-01-18 @ 21:18), Max: 37.3 (02-01-18 @ 17:58)  T(F): 99.2 (02-01-18 @ 21:18), Max: 99.2 (02-01-18 @ 17:58)  HR: 80 (02-01-18 @ 23:00) (58 - 88)  BP: 147/65 (02-01-18 @ 23:00) (147/65 - 160/66)  BP(mean): 80 (02-01-18 @ 23:00) (80 - 108)  ABP: 165/56 (02-01-18 @ 23:00) (152/48 - 204/54)  ABP(mean): 99 (02-01-18 @ 22:00) (78 - 108)  RR: 25 (02-01-18 @ 23:00) (14 - 29)  SpO2: 97% (02-01-18 @ 23:00) (95% - 100%)    I&O's Summary    31 Jan 2018 07:01  -  01 Feb 2018 07:00  --------------------------------------------------------  IN: 3274.4 mL / OUT: 2655 mL / NET: 619.4 mL    01 Feb 2018 07:01  -  01 Feb 2018 23:31  --------------------------------------------------------  IN: 925 mL / OUT: 1498 mL / NET: -573 mL        ABG - ( 01 Feb 2018 22:25 )  pH: 7.44  /  pCO2: 34    /  pO2: 124   / HCO3: 22    / Base Excess: -1.2  /  SaO2: 99                              11.0   14.1  )-----------( 133      ( 01 Feb 2018 22:13 )             32.7     01 Feb 2018 22:13    141    |  105    |  25     ----------------------------<  115    4.1     |  23     |  1.85     Ca    8.9        01 Feb 2018 22:13  Phos  3.1       01 Feb 2018 22:13  Mg     2.1       01 Feb 2018 22:13    TPro  6.0    /  Alb  3.3    /  TBili  0.6    /  DBili  x      /  AST  37     /  ALT  23     /  AlkPhos  92     01 Feb 2018 22:13    PT/INR - ( 01 Feb 2018 22:13 )   PT: 14.9 sec;   INR: 1.33          PTT - ( 01 Feb 2018 22:13 )  PTT:30.1 sec  MEDICATIONS  (STANDING):  aspirin enteric coated 81 milliGRAM(s) Oral daily  atorvastatin 80 milliGRAM(s) Oral at bedtime  ceFAZolin   IVPB 2000 milliGRAM(s) IV Intermittent every 12 hours  docusate sodium 100 milliGRAM(s) Oral two times a day  doxazosin 4 milliGRAM(s) Oral at bedtime  heparin  Injectable 5000 Unit(s) SubCutaneous every 8 hours  insulin lispro (HumaLOG) corrective regimen sliding scale   SubCutaneous Before meals and at bedtime  ketorolac 0.5% Ophthalmic Solution 1 Drop(s) Right EYE three times a day  metoprolol     tartrate 25 milliGRAM(s) Oral two times a day  ofloxacin 0.3% Solution 1 Drop(s) Right EYE three times a day  pantoprazole    Tablet 40 milliGRAM(s) Oral before breakfast  piperacillin/tazobactam IVPB. 3.375 Gram(s) IV Intermittent every 6 hours  polyethylene glycol 3350 17 Gram(s) Oral daily  prednisoLONE  forte 1% Suspension 1 Drop(s) Right EYE three times a day  senna 2 Tablet(s) Oral at bedtime      Home Medications:  Cardura 4 mg oral tablet: 1 tab(s) orally once a day (28 Jan 2018 00:09)  Centrum Silver Men&#x27;s: 1    (28 Jan 2018 00:09)  simvastatin 80 mg oral tablet: 1 tab(s) orally once a day (at bedtime) (28 Jan 2018 00:09)  verapamil 240 mg/24 hours oral capsule, extended release: 1 cap(s) orally once a day (28 Jan 2018 00:09)    PHYSICAL EXAM:  Gen : no acute distress  Neck: No LAD, No JVD  Respiratory: decreased in the bases  Cardiovascular: S1 and S2, RRR, no M/G/R  Gastrointestinal: BS+, soft, NT/ND  Extremities: No peripheral edema  Vascular: 2+ peripheral pulses  Neurological: A/O x 3, no focal deficits  Incision: clean dry/ no sign of infection  Lines: no sign of infection

## 2018-02-01 NOTE — PHYSICAL THERAPY INITIAL EVALUATION ADULT - PERTINENT HX OF CURRENT PROBLEM, REHAB EVAL
This is an 85 year old male, a poor historian with hypertension, hypercholesterolemia, BPH, s/p AAA repair,  BIBA to [facility] after feeling lightheaded and fell at home on Friday night.

## 2018-02-01 NOTE — PHYSICAL THERAPY INITIAL EVALUATION ADULT - CRITERIA FOR SKILLED THERAPEUTIC INTERVENTIONS
impairments found/rehab potential/functional limitations in following categories/anticipated discharge recommendation

## 2018-02-01 NOTE — PHYSICAL THERAPY INITIAL EVALUATION ADULT - ADDITIONAL COMMENTS
Pt lives alone in elevator access apartment. Ambulates with RW at baseline and per daughter's report lived a sedentary lifestyle.

## 2018-02-02 LAB
ALBUMIN SERPL ELPH-MCNC: 2.9 G/DL — LOW (ref 3.3–5)
ALP SERPL-CCNC: 99 U/L — SIGNIFICANT CHANGE UP (ref 40–120)
ALT FLD-CCNC: 19 U/L — SIGNIFICANT CHANGE UP (ref 10–45)
ANION GAP SERPL CALC-SCNC: 13 MMOL/L — SIGNIFICANT CHANGE UP (ref 5–17)
APTT BLD: 36.4 SEC — SIGNIFICANT CHANGE UP (ref 27.5–37.4)
AST SERPL-CCNC: 35 U/L — SIGNIFICANT CHANGE UP (ref 10–40)
BILIRUB SERPL-MCNC: 0.8 MG/DL — SIGNIFICANT CHANGE UP (ref 0.2–1.2)
BUN SERPL-MCNC: 25 MG/DL — HIGH (ref 7–23)
CALCIUM SERPL-MCNC: 8.7 MG/DL — SIGNIFICANT CHANGE UP (ref 8.4–10.5)
CHLORIDE SERPL-SCNC: 105 MMOL/L — SIGNIFICANT CHANGE UP (ref 96–108)
CO2 SERPL-SCNC: 23 MMOL/L — SIGNIFICANT CHANGE UP (ref 22–31)
CREAT SERPL-MCNC: 1.93 MG/DL — HIGH (ref 0.5–1.3)
CULTURE RESULTS: SIGNIFICANT CHANGE UP
CULTURE RESULTS: SIGNIFICANT CHANGE UP
GAS PNL BLDA: SIGNIFICANT CHANGE UP
GLUCOSE BLDC GLUCOMTR-MCNC: 123 MG/DL — HIGH (ref 70–99)
GLUCOSE BLDC GLUCOMTR-MCNC: 146 MG/DL — HIGH (ref 70–99)
GLUCOSE BLDC GLUCOMTR-MCNC: 154 MG/DL — HIGH (ref 70–99)
GLUCOSE BLDC GLUCOMTR-MCNC: 91 MG/DL — SIGNIFICANT CHANGE UP (ref 70–99)
GLUCOSE SERPL-MCNC: 122 MG/DL — HIGH (ref 70–99)
HCT VFR BLD CALC: 31.1 % — LOW (ref 39–50)
HGB BLD-MCNC: 10.5 G/DL — LOW (ref 13–17)
INR BLD: 1.39 — HIGH (ref 0.88–1.16)
LACTATE SERPL-SCNC: 0.7 MMOL/L — SIGNIFICANT CHANGE UP (ref 0.5–2)
MAGNESIUM SERPL-MCNC: 2 MG/DL — SIGNIFICANT CHANGE UP (ref 1.6–2.6)
MCHC RBC-ENTMCNC: 29.3 PG — SIGNIFICANT CHANGE UP (ref 27–34)
MCHC RBC-ENTMCNC: 33.8 G/DL — SIGNIFICANT CHANGE UP (ref 32–36)
MCV RBC AUTO: 86.9 FL — SIGNIFICANT CHANGE UP (ref 80–100)
PHOSPHATE SERPL-MCNC: 2.8 MG/DL — SIGNIFICANT CHANGE UP (ref 2.5–4.5)
PLATELET # BLD AUTO: 141 K/UL — LOW (ref 150–400)
POTASSIUM SERPL-MCNC: 3.8 MMOL/L — SIGNIFICANT CHANGE UP (ref 3.5–5.3)
POTASSIUM SERPL-SCNC: 3.8 MMOL/L — SIGNIFICANT CHANGE UP (ref 3.5–5.3)
PROT SERPL-MCNC: 5.9 G/DL — LOW (ref 6–8.3)
PROTHROM AB SERPL-ACNC: 15.5 SEC — HIGH (ref 9.8–12.7)
RBC # BLD: 3.58 M/UL — LOW (ref 4.2–5.8)
RBC # FLD: 14.2 % — SIGNIFICANT CHANGE UP (ref 10.3–16.9)
SODIUM SERPL-SCNC: 141 MMOL/L — SIGNIFICANT CHANGE UP (ref 135–145)
SPECIMEN SOURCE: SIGNIFICANT CHANGE UP
SPECIMEN SOURCE: SIGNIFICANT CHANGE UP
WBC # BLD: 13.2 K/UL — HIGH (ref 3.8–10.5)
WBC # FLD AUTO: 13.2 K/UL — HIGH (ref 3.8–10.5)

## 2018-02-02 PROCEDURE — 71045 X-RAY EXAM CHEST 1 VIEW: CPT | Mod: 26

## 2018-02-02 PROCEDURE — 99291 CRITICAL CARE FIRST HOUR: CPT

## 2018-02-02 PROCEDURE — 93010 ELECTROCARDIOGRAM REPORT: CPT

## 2018-02-02 RX ORDER — HYDROCORTISONE 1 %
1 OINTMENT (GRAM) TOPICAL
Qty: 0 | Refills: 0 | Status: COMPLETED | OUTPATIENT
Start: 2018-02-02 | End: 2018-02-03

## 2018-02-02 RX ORDER — METOPROLOL TARTRATE 50 MG
25 TABLET ORAL EVERY 12 HOURS
Qty: 0 | Refills: 0 | Status: DISCONTINUED | OUTPATIENT
Start: 2018-02-02 | End: 2018-02-02

## 2018-02-02 RX ORDER — METOPROLOL TARTRATE 50 MG
25 TABLET ORAL EVERY 6 HOURS
Qty: 0 | Refills: 0 | Status: DISCONTINUED | OUTPATIENT
Start: 2018-02-02 | End: 2018-02-08

## 2018-02-02 RX ORDER — DIPHENHYDRAMINE HCL 50 MG
25 CAPSULE ORAL ONCE
Qty: 0 | Refills: 0 | Status: COMPLETED | OUTPATIENT
Start: 2018-02-02 | End: 2018-02-02

## 2018-02-02 RX ORDER — SODIUM CHLORIDE 9 MG/ML
500 INJECTION, SOLUTION INTRAVENOUS ONCE
Qty: 0 | Refills: 0 | Status: COMPLETED | OUTPATIENT
Start: 2018-02-02 | End: 2018-02-02

## 2018-02-02 RX ADMIN — Medication 1 DROP(S): at 15:31

## 2018-02-02 RX ADMIN — Medication 1 DROP(S): at 05:44

## 2018-02-02 RX ADMIN — SENNA PLUS 2 TABLET(S): 8.6 TABLET ORAL at 21:15

## 2018-02-02 RX ADMIN — Medication 1 DROP(S): at 21:16

## 2018-02-02 RX ADMIN — Medication 100 MILLIGRAM(S): at 05:44

## 2018-02-02 RX ADMIN — Medication 1 DROP(S): at 15:32

## 2018-02-02 RX ADMIN — PANTOPRAZOLE SODIUM 40 MILLIGRAM(S): 20 TABLET, DELAYED RELEASE ORAL at 05:44

## 2018-02-02 RX ADMIN — HEPARIN SODIUM 5000 UNIT(S): 5000 INJECTION INTRAVENOUS; SUBCUTANEOUS at 15:31

## 2018-02-02 RX ADMIN — PIPERACILLIN AND TAZOBACTAM 200 GRAM(S): 4; .5 INJECTION, POWDER, LYOPHILIZED, FOR SOLUTION INTRAVENOUS at 11:08

## 2018-02-02 RX ADMIN — PIPERACILLIN AND TAZOBACTAM 200 GRAM(S): 4; .5 INJECTION, POWDER, LYOPHILIZED, FOR SOLUTION INTRAVENOUS at 05:44

## 2018-02-02 RX ADMIN — Medication 25 MILLIGRAM(S): at 21:08

## 2018-02-02 RX ADMIN — Medication 25 MILLIGRAM(S): at 05:44

## 2018-02-02 RX ADMIN — SODIUM CHLORIDE 3 MILLILITER(S): 9 INJECTION INTRAMUSCULAR; INTRAVENOUS; SUBCUTANEOUS at 13:38

## 2018-02-02 RX ADMIN — SODIUM CHLORIDE 3 MILLILITER(S): 9 INJECTION INTRAMUSCULAR; INTRAVENOUS; SUBCUTANEOUS at 21:11

## 2018-02-02 RX ADMIN — Medication 1 DROP(S): at 05:43

## 2018-02-02 RX ADMIN — Medication 25 MILLIGRAM(S): at 21:15

## 2018-02-02 RX ADMIN — POLYETHYLENE GLYCOL 3350 17 GRAM(S): 17 POWDER, FOR SOLUTION ORAL at 11:08

## 2018-02-02 RX ADMIN — Medication 650 MILLIGRAM(S): at 16:23

## 2018-02-02 RX ADMIN — SODIUM CHLORIDE 500 MILLILITER(S): 9 INJECTION, SOLUTION INTRAVENOUS at 01:38

## 2018-02-02 RX ADMIN — Medication 100 MILLIGRAM(S): at 18:03

## 2018-02-02 RX ADMIN — SODIUM CHLORIDE 3 MILLILITER(S): 9 INJECTION INTRAMUSCULAR; INTRAVENOUS; SUBCUTANEOUS at 05:44

## 2018-02-02 RX ADMIN — PIPERACILLIN AND TAZOBACTAM 200 GRAM(S): 4; .5 INJECTION, POWDER, LYOPHILIZED, FOR SOLUTION INTRAVENOUS at 18:03

## 2018-02-02 RX ADMIN — Medication 650 MILLIGRAM(S): at 17:13

## 2018-02-02 RX ADMIN — Medication 4 MILLIGRAM(S): at 21:16

## 2018-02-02 RX ADMIN — Medication 25 MILLIGRAM(S): at 15:46

## 2018-02-02 RX ADMIN — ATORVASTATIN CALCIUM 80 MILLIGRAM(S): 80 TABLET, FILM COATED ORAL at 21:15

## 2018-02-02 RX ADMIN — HEPARIN SODIUM 5000 UNIT(S): 5000 INJECTION INTRAVENOUS; SUBCUTANEOUS at 21:16

## 2018-02-02 RX ADMIN — Medication 81 MILLIGRAM(S): at 11:08

## 2018-02-02 RX ADMIN — HEPARIN SODIUM 5000 UNIT(S): 5000 INJECTION INTRAVENOUS; SUBCUTANEOUS at 05:44

## 2018-02-02 NOTE — PROGRESS NOTE ADULT - SUBJECTIVE AND OBJECTIVE BOX
PMH :  TRIPLE VESSEL DISEASE  NSTEMI  No h/o HF  Unknown h/o HF  Family history of heart disease (Father)  No pertinent family history in first degree relatives  Handoff  MEWS Score  Hemorrhoids  Hypercholesterolemia  Benign prostate hyperplasia  Hypertension  Coronary artery disease involving native coronary artery with other forms of angina pectoris, unspecified whether native or transplanted heart  Coronary artery disease involving native coronary artery with other forms of angina pectoris, unspecified whether native or transplanted heart  CABG  History of tonsillectomy  Status post cataract extraction  H/O transurethral resection of prostate  AAA (abdominal aortic aneurysm)    ROS  All other systems reviewed and negative.    ICU Vital Signs Last 24 Hrs  T(C): 36.9 (02-02-18 @ 17:44), Max: 37.3 (02-02-18 @ 14:00)  T(F): 98.4 (02-02-18 @ 17:44), Max: 99.2 (02-02-18 @ 14:00)  HR: 86 (02-02-18 @ 21:00) (76 - 100)  BP: 140/74 (02-02-18 @ 21:00) (119/46 - 180/83)  BP(mean): 81 (02-02-18 @ 21:00) (76 - 125)  ABP: 167/54 (02-02-18 @ 07:00) (136/58 - 182/56)  ABP(mean): 86 (02-02-18 @ 07:00) (86 - 102)  RR: 18 (02-02-18 @ 21:00) (17 - 28)  SpO2: 95% (02-02-18 @ 21:00) (92% - 99%)    I&O's Summary    01 Feb 2018 07:01  -  02 Feb 2018 07:00  --------------------------------------------------------  IN: 2025 mL / OUT: 2093 mL / NET: -68 mL    02 Feb 2018 07:01  -  02 Feb 2018 21:36  --------------------------------------------------------  IN: 655 mL / OUT: 555 mL / NET: 100 mL        ABG - ( 02 Feb 2018 03:04 )  pH: 7.45  /  pCO2: 34    /  pO2: 81    / HCO3: 24    / Base Excess: x     /  SaO2: 97                                      10.5   13.2  )-----------( 141      ( 02 Feb 2018 03:10 )             31.1     02 Feb 2018 03:10    141    |  105    |  25     ----------------------------<  122    3.8     |  23     |  1.93     Ca    8.7        02 Feb 2018 03:10  Phos  2.8       02 Feb 2018 03:10  Mg     2.0       02 Feb 2018 03:10    TPro  5.9    /  Alb  2.9    /  TBili  0.8    /  DBili  x      /  AST  35     /  ALT  19     /  AlkPhos  99     02 Feb 2018 03:10    PT/INR - ( 02 Feb 2018 03:10 )   PT: 15.5 sec;   INR: 1.39          PTT - ( 02 Feb 2018 03:10 )  PTT:36.4 sec  MEDICATIONS  (STANDING):  aspirin enteric coated 81 milliGRAM(s) Oral daily  atorvastatin 80 milliGRAM(s) Oral at bedtime  dexmedetomidine Infusion 0.5 MICROgram(s)/kG/Hr IV Continuous <Continuous>  dextrose 5%. 1000 milliLiter(s) IV Continuous <Continuous>  dextrose 50% Injectable 12.5 Gram(s) IV Push once  dextrose 50% Injectable 25 Gram(s) IV Push once  dextrose 50% Injectable 25 Gram(s) IV Push once  docusate sodium 100 milliGRAM(s) Oral two times a day  doxazosin 4 milliGRAM(s) Oral at bedtime  heparin  Injectable 5000 Unit(s) SubCutaneous every 8 hours  insulin lispro (HumaLOG) corrective regimen sliding scale   SubCutaneous Before meals and at bedtime  ketorolac 0.5% Ophthalmic Solution 1 Drop(s) Right EYE three times a day  metoprolol     tartrate 25 milliGRAM(s) Oral every 6 hours  ofloxacin 0.3% Solution 1 Drop(s) Right EYE three times a day  pantoprazole    Tablet 40 milliGRAM(s) Oral before breakfast  piperacillin/tazobactam IVPB. 3.375 Gram(s) IV Intermittent every 6 hours  polyethylene glycol 3350 17 Gram(s) Oral daily  prednisoLONE  forte 1% Suspension 1 Drop(s) Right EYE three times a day  senna 2 Tablet(s) Oral at bedtime  sodium chloride 0.45%. 1000 milliLiter(s) IV Continuous <Continuous>  sodium chloride 0.9% lock flush 3 milliLiter(s) IV Push every 8 hours  sodium chloride 0.9%. 1000 milliLiter(s) IV Continuous <Continuous>    Home Medications:  Cardura 4 mg oral tablet: 1 tab(s) orally once a day (28 Jan 2018 00:09)  Centrum Silver Men&#x27;s: 1    (28 Jan 2018 00:09)  simvastatin 80 mg oral tablet: 1 tab(s) orally once a day (at bedtime) (28 Jan 2018 00:09)  verapamil 240 mg/24 hours oral capsule, extended release: 1 cap(s) orally once a day (28 Jan 2018 00:09)    PHYSICAL EXAM:  Gen : no acute distress  Neck: No LAD, No JVD  Respiratory: decreased in the bases  Cardiovascular: S1 and S2, RRR, no M/G/R  Gastrointestinal: BS+, soft, NT/ND  Extremities: No peripheral edema  Vascular: 2+ peripheral pulses  Neurological: A/O x 3, no focal deficits  Incision: clean dry/ no sign of infection  Lines: no sign of infection POD 2 s/p MIDCAB    86 yo admitted with NSTEMI in the setting of E coli bacteremia likely from diverticulitis.  LHC with 3 vessel CAD pt s/p CABGX1  Pt with PVD, admitted with CKD.    PMH :  TRIPLE VESSEL DISEASE  NSTEMI  No h/o HF  Unknown h/o HF  Family history of heart disease (Father)  No pertinent family history in first degree relatives  Handoff  MEWS Score  Hemorrhoids  Hypercholesterolemia  Benign prostate hyperplasia  Hypertension  Coronary artery disease involving native coronary artery with other forms of angina pectoris, unspecified whether native or transplanted heart  Coronary artery disease involving native coronary artery with other forms of angina pectoris, unspecified whether native or transplanted heart  CABG  History of tonsillectomy  Status post cataract extraction  H/O transurethral resection of prostate  AAA (abdominal aortic aneurysm)    ROS  All other systems reviewed and negative.    ICU Vital Signs Last 24 Hrs  T(C): 36.9 (02-02-18 @ 17:44), Max: 37.3 (02-02-18 @ 14:00)  T(F): 98.4 (02-02-18 @ 17:44), Max: 99.2 (02-02-18 @ 14:00)  HR: 86 (02-02-18 @ 21:00) (76 - 100)  BP: 140/74 (02-02-18 @ 21:00) (119/46 - 180/83)  BP(mean): 81 (02-02-18 @ 21:00) (76 - 125)  ABP: 167/54 (02-02-18 @ 07:00) (136/58 - 182/56)  ABP(mean): 86 (02-02-18 @ 07:00) (86 - 102)  RR: 18 (02-02-18 @ 21:00) (17 - 28)  SpO2: 95% (02-02-18 @ 21:00) (92% - 99%)    I&O's Summary    01 Feb 2018 07:01  -  02 Feb 2018 07:00  --------------------------------------------------------  IN: 2025 mL / OUT: 2093 mL / NET: -68 mL    02 Feb 2018 07:01  -  02 Feb 2018 21:36  --------------------------------------------------------  IN: 655 mL / OUT: 555 mL / NET: 100 mL        ABG - ( 02 Feb 2018 03:04 )  pH: 7.45  /  pCO2: 34    /  pO2: 81    / HCO3: 24    / Base Excess: x     /  SaO2: 97                                      10.5   13.2  )-----------( 141      ( 02 Feb 2018 03:10 )             31.1     02 Feb 2018 03:10    141    |  105    |  25     ----------------------------<  122    3.8     |  23     |  1.93     Ca    8.7        02 Feb 2018 03:10  Phos  2.8       02 Feb 2018 03:10  Mg     2.0       02 Feb 2018 03:10    TPro  5.9    /  Alb  2.9    /  TBili  0.8    /  DBili  x      /  AST  35     /  ALT  19     /  AlkPhos  99     02 Feb 2018 03:10    PT/INR - ( 02 Feb 2018 03:10 )   PT: 15.5 sec;   INR: 1.39          PTT - ( 02 Feb 2018 03:10 )  PTT:36.4 sec  MEDICATIONS  (STANDING):  aspirin enteric coated 81 milliGRAM(s) Oral daily  atorvastatin 80 milliGRAM(s) Oral at bedtime  dexmedetomidine Infusion 0.5 MICROgram(s)/kG/Hr IV Continuous <Continuous>  dextrose 5%. 1000 milliLiter(s) IV Continuous <Continuous>  dextrose 50% Injectable 12.5 Gram(s) IV Push once  dextrose 50% Injectable 25 Gram(s) IV Push once  dextrose 50% Injectable 25 Gram(s) IV Push once  docusate sodium 100 milliGRAM(s) Oral two times a day  doxazosin 4 milliGRAM(s) Oral at bedtime  heparin  Injectable 5000 Unit(s) SubCutaneous every 8 hours  insulin lispro (HumaLOG) corrective regimen sliding scale   SubCutaneous Before meals and at bedtime  ketorolac 0.5% Ophthalmic Solution 1 Drop(s) Right EYE three times a day  metoprolol     tartrate 25 milliGRAM(s) Oral every 6 hours  ofloxacin 0.3% Solution 1 Drop(s) Right EYE three times a day  pantoprazole    Tablet 40 milliGRAM(s) Oral before breakfast  piperacillin/tazobactam IVPB. 3.375 Gram(s) IV Intermittent every 6 hours  polyethylene glycol 3350 17 Gram(s) Oral daily  prednisoLONE  forte 1% Suspension 1 Drop(s) Right EYE three times a day  senna 2 Tablet(s) Oral at bedtime  sodium chloride 0.45%. 1000 milliLiter(s) IV Continuous <Continuous>  sodium chloride 0.9% lock flush 3 milliLiter(s) IV Push every 8 hours  sodium chloride 0.9%. 1000 milliLiter(s) IV Continuous <Continuous>    Home Medications:  Cardura 4 mg oral tablet: 1 tab(s) orally once a day (28 Jan 2018 00:09)  Centrum Silver Men&#x27;s: 1    (28 Jan 2018 00:09)  simvastatin 80 mg oral tablet: 1 tab(s) orally once a day (at bedtime) (28 Jan 2018 00:09)  verapamil 240 mg/24 hours oral capsule, extended release: 1 cap(s) orally once a day (28 Jan 2018 00:09)    PHYSICAL EXAM:  Gen : no acute distress  Neck: No LAD, No JVD  Respiratory: decreased in the bases  Cardiovascular: S1 and S2, RRR, no M/G/R  Gastrointestinal: BS+, soft, NT/ND  Extremities: No peripheral edema  Vascular: 2+ peripheral pulses  Neurological: A/O x 3, no focal deficits  Incision: clean dry/ no sign of infection  Lines: no sign of infection

## 2018-02-02 NOTE — PROGRESS NOTE ADULT - ASSESSMENT
84 yo admitted with NSTEMI in the setting of Ecoli bacteremia  s/p MIDCAB. Pt with pre-op renal insufficiency, episodes of delirium/sundowning since admission     problem   1. CAD s/p MIDCAB  2. E coli bacteremia   3. Acute and CRI   4. Sundowning/delirium     Plan   Neuro -- pain control, trying to avoid narcotics  CVS - BP management.  ASA/Plavix  Pulm - IS/pulm toileting   GI - GI proph/diet   - monitor UOP, CKD Cr slight trend up. will monitor  Endo - glycemic control  Heme - DVT proph  ID - Zosyn for E coli coverage.        Critical post op.    Critical care time spent 40 min

## 2018-02-03 LAB
ALBUMIN SERPL ELPH-MCNC: 3.2 G/DL — LOW (ref 3.3–5)
ALP SERPL-CCNC: 104 U/L — SIGNIFICANT CHANGE UP (ref 40–120)
ALT FLD-CCNC: 8 U/L — LOW (ref 10–45)
ANION GAP SERPL CALC-SCNC: 13 MMOL/L — SIGNIFICANT CHANGE UP (ref 5–17)
APTT BLD: 36.2 SEC — SIGNIFICANT CHANGE UP (ref 27.5–37.4)
AST SERPL-CCNC: 23 U/L — SIGNIFICANT CHANGE UP (ref 10–40)
BILIRUB SERPL-MCNC: 0.9 MG/DL — SIGNIFICANT CHANGE UP (ref 0.2–1.2)
BUN SERPL-MCNC: 31 MG/DL — HIGH (ref 7–23)
CALCIUM SERPL-MCNC: 9.4 MG/DL — SIGNIFICANT CHANGE UP (ref 8.4–10.5)
CHLORIDE SERPL-SCNC: 104 MMOL/L — SIGNIFICANT CHANGE UP (ref 96–108)
CO2 SERPL-SCNC: 25 MMOL/L — SIGNIFICANT CHANGE UP (ref 22–31)
CREAT SERPL-MCNC: 1.91 MG/DL — HIGH (ref 0.5–1.3)
GLUCOSE BLDC GLUCOMTR-MCNC: 105 MG/DL — HIGH (ref 70–99)
GLUCOSE BLDC GLUCOMTR-MCNC: 159 MG/DL — HIGH (ref 70–99)
GLUCOSE BLDC GLUCOMTR-MCNC: 187 MG/DL — HIGH (ref 70–99)
GLUCOSE SERPL-MCNC: 178 MG/DL — HIGH (ref 70–99)
HCT VFR BLD CALC: 30.2 % — LOW (ref 39–50)
HGB BLD-MCNC: 10.2 G/DL — LOW (ref 13–17)
INR BLD: 1.2 — HIGH (ref 0.88–1.16)
LACTATE SERPL-SCNC: 2 MMOL/L — SIGNIFICANT CHANGE UP (ref 0.5–2)
MAGNESIUM SERPL-MCNC: 2.2 MG/DL — SIGNIFICANT CHANGE UP (ref 1.6–2.6)
MCHC RBC-ENTMCNC: 29.2 PG — SIGNIFICANT CHANGE UP (ref 27–34)
MCHC RBC-ENTMCNC: 33.8 G/DL — SIGNIFICANT CHANGE UP (ref 32–36)
MCV RBC AUTO: 86.5 FL — SIGNIFICANT CHANGE UP (ref 80–100)
PHOSPHATE SERPL-MCNC: 2.6 MG/DL — SIGNIFICANT CHANGE UP (ref 2.5–4.5)
PLATELET # BLD AUTO: 148 K/UL — LOW (ref 150–400)
POTASSIUM SERPL-MCNC: 3.9 MMOL/L — SIGNIFICANT CHANGE UP (ref 3.5–5.3)
POTASSIUM SERPL-SCNC: 3.9 MMOL/L — SIGNIFICANT CHANGE UP (ref 3.5–5.3)
PROT SERPL-MCNC: 6 G/DL — SIGNIFICANT CHANGE UP (ref 6–8.3)
PROTHROM AB SERPL-ACNC: 13.4 SEC — HIGH (ref 9.8–12.7)
RBC # BLD: 3.49 M/UL — LOW (ref 4.2–5.8)
RBC # FLD: 14.6 % — SIGNIFICANT CHANGE UP (ref 10.3–16.9)
SODIUM SERPL-SCNC: 142 MMOL/L — SIGNIFICANT CHANGE UP (ref 135–145)
WBC # BLD: 9.5 K/UL — SIGNIFICANT CHANGE UP (ref 3.8–10.5)
WBC # FLD AUTO: 9.5 K/UL — SIGNIFICANT CHANGE UP (ref 3.8–10.5)

## 2018-02-03 PROCEDURE — 99291 CRITICAL CARE FIRST HOUR: CPT

## 2018-02-03 PROCEDURE — 71045 X-RAY EXAM CHEST 1 VIEW: CPT | Mod: 26

## 2018-02-03 RX ORDER — FUROSEMIDE 40 MG
20 TABLET ORAL ONCE
Qty: 0 | Refills: 0 | Status: DISCONTINUED | OUTPATIENT
Start: 2018-02-03 | End: 2018-02-03

## 2018-02-03 RX ORDER — AMIODARONE HYDROCHLORIDE 400 MG/1
150 TABLET ORAL ONCE
Qty: 0 | Refills: 0 | Status: COMPLETED | OUTPATIENT
Start: 2018-02-03 | End: 2018-02-03

## 2018-02-03 RX ORDER — AMIODARONE HYDROCHLORIDE 400 MG/1
200 TABLET ORAL DAILY
Qty: 0 | Refills: 0 | Status: DISCONTINUED | OUTPATIENT
Start: 2018-02-07 | End: 2018-02-08

## 2018-02-03 RX ORDER — ALBUMIN HUMAN 25 %
250 VIAL (ML) INTRAVENOUS ONCE
Qty: 0 | Refills: 0 | Status: COMPLETED | OUTPATIENT
Start: 2018-02-03 | End: 2018-02-03

## 2018-02-03 RX ORDER — FUROSEMIDE 40 MG
20 TABLET ORAL ONCE
Qty: 0 | Refills: 0 | Status: COMPLETED | OUTPATIENT
Start: 2018-02-03 | End: 2018-02-03

## 2018-02-03 RX ORDER — CEFTRIAXONE 500 MG/1
2000 INJECTION, POWDER, FOR SOLUTION INTRAMUSCULAR; INTRAVENOUS EVERY 24 HOURS
Qty: 0 | Refills: 0 | Status: DISCONTINUED | OUTPATIENT
Start: 2018-02-03 | End: 2018-02-08

## 2018-02-03 RX ORDER — POTASSIUM CHLORIDE 20 MEQ
40 PACKET (EA) ORAL ONCE
Qty: 0 | Refills: 0 | Status: DISCONTINUED | OUTPATIENT
Start: 2018-02-03 | End: 2018-02-03

## 2018-02-03 RX ORDER — POTASSIUM CHLORIDE 20 MEQ
20 PACKET (EA) ORAL ONCE
Qty: 0 | Refills: 0 | Status: DISCONTINUED | OUTPATIENT
Start: 2018-02-03 | End: 2018-02-03

## 2018-02-03 RX ORDER — POTASSIUM CHLORIDE 20 MEQ
40 PACKET (EA) ORAL ONCE
Qty: 0 | Refills: 0 | Status: COMPLETED | OUTPATIENT
Start: 2018-02-03 | End: 2018-02-03

## 2018-02-03 RX ORDER — CEFTRIAXONE 500 MG/1
2 INJECTION, POWDER, FOR SOLUTION INTRAMUSCULAR; INTRAVENOUS EVERY 24 HOURS
Qty: 0 | Refills: 0 | Status: DISCONTINUED | OUTPATIENT
Start: 2018-02-03 | End: 2018-02-03

## 2018-02-03 RX ORDER — METOPROLOL TARTRATE 50 MG
5 TABLET ORAL ONCE
Qty: 0 | Refills: 0 | Status: DISCONTINUED | OUTPATIENT
Start: 2018-02-03 | End: 2018-02-03

## 2018-02-03 RX ORDER — AMIODARONE HYDROCHLORIDE 400 MG/1
400 TABLET ORAL EVERY 8 HOURS
Qty: 0 | Refills: 0 | Status: COMPLETED | OUTPATIENT
Start: 2018-02-03 | End: 2018-02-07

## 2018-02-03 RX ADMIN — Medication 81 MILLIGRAM(S): at 11:34

## 2018-02-03 RX ADMIN — Medication 4 MILLIGRAM(S): at 21:26

## 2018-02-03 RX ADMIN — Medication 1 DROP(S): at 21:26

## 2018-02-03 RX ADMIN — Medication 1 DROP(S): at 15:30

## 2018-02-03 RX ADMIN — PIPERACILLIN AND TAZOBACTAM 200 GRAM(S): 4; .5 INJECTION, POWDER, LYOPHILIZED, FOR SOLUTION INTRAVENOUS at 06:18

## 2018-02-03 RX ADMIN — Medication 2: at 12:17

## 2018-02-03 RX ADMIN — HEPARIN SODIUM 5000 UNIT(S): 5000 INJECTION INTRAVENOUS; SUBCUTANEOUS at 21:26

## 2018-02-03 RX ADMIN — AMIODARONE HYDROCHLORIDE 600 MILLIGRAM(S): 400 TABLET ORAL at 07:00

## 2018-02-03 RX ADMIN — Medication 1 APPLICATION(S): at 18:08

## 2018-02-03 RX ADMIN — CEFTRIAXONE 2000 MILLIGRAM(S): 500 INJECTION, POWDER, FOR SOLUTION INTRAMUSCULAR; INTRAVENOUS at 21:25

## 2018-02-03 RX ADMIN — AMIODARONE HYDROCHLORIDE 300 MILLIGRAM(S): 400 TABLET ORAL at 05:00

## 2018-02-03 RX ADMIN — Medication 20 MILLIGRAM(S): at 13:47

## 2018-02-03 RX ADMIN — Medication 25 MILLIGRAM(S): at 18:09

## 2018-02-03 RX ADMIN — Medication 125 MILLILITER(S): at 04:20

## 2018-02-03 RX ADMIN — Medication 1 DROP(S): at 06:15

## 2018-02-03 RX ADMIN — PIPERACILLIN AND TAZOBACTAM 200 GRAM(S): 4; .5 INJECTION, POWDER, LYOPHILIZED, FOR SOLUTION INTRAVENOUS at 11:51

## 2018-02-03 RX ADMIN — Medication 25 MILLIGRAM(S): at 01:08

## 2018-02-03 RX ADMIN — Medication 1 DROP(S): at 15:28

## 2018-02-03 RX ADMIN — SODIUM CHLORIDE 3 MILLILITER(S): 9 INJECTION INTRAMUSCULAR; INTRAVENOUS; SUBCUTANEOUS at 21:26

## 2018-02-03 RX ADMIN — HEPARIN SODIUM 5000 UNIT(S): 5000 INJECTION INTRAVENOUS; SUBCUTANEOUS at 15:30

## 2018-02-03 RX ADMIN — SODIUM CHLORIDE 3 MILLILITER(S): 9 INJECTION INTRAMUSCULAR; INTRAVENOUS; SUBCUTANEOUS at 15:10

## 2018-02-03 RX ADMIN — AMIODARONE HYDROCHLORIDE 400 MILLIGRAM(S): 400 TABLET ORAL at 15:25

## 2018-02-03 RX ADMIN — Medication 2: at 18:13

## 2018-02-03 RX ADMIN — SODIUM CHLORIDE 3 MILLILITER(S): 9 INJECTION INTRAMUSCULAR; INTRAVENOUS; SUBCUTANEOUS at 06:21

## 2018-02-03 RX ADMIN — Medication 1 DROP(S): at 15:27

## 2018-02-03 RX ADMIN — Medication 25 MILLIGRAM(S): at 23:52

## 2018-02-03 RX ADMIN — Medication 25 MILLIGRAM(S): at 11:34

## 2018-02-03 RX ADMIN — Medication 1 APPLICATION(S): at 06:18

## 2018-02-03 RX ADMIN — AMIODARONE HYDROCHLORIDE 400 MILLIGRAM(S): 400 TABLET ORAL at 21:25

## 2018-02-03 RX ADMIN — AMIODARONE HYDROCHLORIDE 600 MILLIGRAM(S): 400 TABLET ORAL at 11:16

## 2018-02-03 RX ADMIN — ATORVASTATIN CALCIUM 80 MILLIGRAM(S): 80 TABLET, FILM COATED ORAL at 21:25

## 2018-02-03 RX ADMIN — Medication 125 MILLILITER(S): at 04:45

## 2018-02-03 RX ADMIN — PIPERACILLIN AND TAZOBACTAM 200 GRAM(S): 4; .5 INJECTION, POWDER, LYOPHILIZED, FOR SOLUTION INTRAVENOUS at 18:08

## 2018-02-03 RX ADMIN — Medication 40 MILLIEQUIVALENT(S): at 13:47

## 2018-02-03 NOTE — PROGRESS NOTE ADULT - SUBJECTIVE AND OBJECTIVE BOX
CTICU  CRITICAL  CARE  attending     Hand off received 					   Pertinent clinical, laboratory, radiographic, hemodynamic, echocardiographic, respiratory data, microbiologic data and chart were reviewed and analyzed frequently throughout the course of the day and night  Patient seen and examined with CTS/ SH attending at bedside  Pt is a 85y , Male, HEALTH ISSUES - PROBLEM Dx:      , FAMILY HISTORY:  Family history of heart disease (Father)  PAST MEDICAL & SURGICAL HISTORY:  Hemorrhoids  Hypercholesterolemia  Benign prostate hyperplasia  Hypertension  History of tonsillectomy  Status post cataract extraction: bilateral eyes  AAA (abdominal aortic aneurysm)    Patient is a 85y old  Male who presents with a chief complaint of 3 vessel CAD (27 Jan 2018 23:05)      14 system review was unremarkable  acute changes include acute respiratory failure  Vital signs, hemodynamic and respiratory parameters were reviewed from the bedside nursing flowsheet.  ICU Vital Signs Last 24 Hrs  T(C): 37.2 (04 Feb 2018 14:00), Max: 37.6 (03 Feb 2018 18:02)  T(F): 98.9 (04 Feb 2018 14:00), Max: 99.7 (03 Feb 2018 18:02)  HR: 66 (04 Feb 2018 15:00) (66 - 80)  BP: 136/47 (04 Feb 2018 15:00) (111/60 - 167/70)  BP(mean): 75 (04 Feb 2018 15:00) (73 - 116)  ABP: --  ABP(mean): --  RR: 18 (04 Feb 2018 11:00) (16 - 22)  SpO2: 96% (04 Feb 2018 15:00) (92% - 98%)    Adult Advanced Hemodynamics Last 24 Hrs  CVP(mm Hg): --  CVP(cm H2O): --  CO: --  CI: --  PA: --  PA(mean): --  PCWP: --  SVR: --  SVRI: --  PVR: --  PVRI: --,     Intake and output was reviewed and the fluid balance was calculated  Daily     Daily   I&O's Summary    03 Feb 2018 07:01  -  04 Feb 2018 07:00  --------------------------------------------------------  IN: 700 mL / OUT: 1665 mL / NET: -965 mL    04 Feb 2018 07:01  -  04 Feb 2018 16:01  --------------------------------------------------------  IN: 0 mL / OUT: 875 mL / NET: -875 mL        All lines and drain sites were assessed  [facility]      POCT Blood Glucose.: 110 mg/dL (04 Feb 2018 12:59)    No acute change in mental status  Auscultation of the chest reveals equal bs  Abdomen is soft  Extremities are warm and well perfused  Wounds appear clean and unremarkable  Antibiotics are periop    labs  CBC Full  -  ( 04 Feb 2018 03:26 )  WBC Count : 9.6 K/uL  Hemoglobin : 9.5 g/dL  Hematocrit : 28.0 %  Platelet Count - Automated : 205 K/uL  Mean Cell Volume : 86.7 fL  Mean Cell Hemoglobin : 29.4 pg  Mean Cell Hemoglobin Concentration : 33.9 g/dL  Auto Neutrophil # : x  Auto Lymphocyte # : x  Auto Monocyte # : x  Auto Eosinophil # : x  Auto Basophil # : x  Auto Neutrophil % : x  Auto Lymphocyte % : x  Auto Monocyte % : x  Auto Eosinophil % : x  Auto Basophil % : x    02-04    143  |  104  |  35<H>  ----------------------------<  108<H>  4.4   |  23  |  2.10<H>    Ca    9.0      04 Feb 2018 03:26  Phos  3.0     02-04  Mg     2.3     02-04    TPro  5.9<L>  /  Alb  2.8<L>  /  TBili  0.8  /  DBili  x   /  AST  32  /  ALT  9<L>  /  AlkPhos  139<H>  02-04    PT/INR - ( 04 Feb 2018 03:26 )   PT: 12.9 sec;   INR: 1.16          PTT - ( 04 Feb 2018 03:26 )  PTT:31.9 sec  The current medications were reviewed   MEDICATIONS  (STANDING):  amiodarone    Tablet 400 milliGRAM(s) Oral every 8 hours  aspirin enteric coated 81 milliGRAM(s) Oral daily  atorvastatin 80 milliGRAM(s) Oral at bedtime  cefTRIAXone Injectable. 2000 milliGRAM(s) IV Push every 24 hours  dexmedetomidine Infusion 0.5 MICROgram(s)/kG/Hr (10.9 mL/Hr) IV Continuous <Continuous>  dextrose 5%. 1000 milliLiter(s) (50 mL/Hr) IV Continuous <Continuous>  dextrose 50% Injectable 12.5 Gram(s) IV Push once  dextrose 50% Injectable 25 Gram(s) IV Push once  dextrose 50% Injectable 25 Gram(s) IV Push once  docusate sodium 100 milliGRAM(s) Oral two times a day  doxazosin 4 milliGRAM(s) Oral at bedtime  heparin  Injectable 5000 Unit(s) SubCutaneous every 8 hours  insulin lispro (HumaLOG) corrective regimen sliding scale   SubCutaneous Before meals and at bedtime  ketorolac 0.5% Ophthalmic Solution 1 Drop(s) Right EYE three times a day  metoprolol     tartrate 25 milliGRAM(s) Oral every 6 hours  ofloxacin 0.3% Solution 1 Drop(s) Right EYE three times a day  pantoprazole    Tablet 40 milliGRAM(s) Oral before breakfast  polyethylene glycol 3350 17 Gram(s) Oral daily  prednisoLONE  forte 1% Suspension 1 Drop(s) Right EYE three times a day  senna 2 Tablet(s) Oral at bedtime  sodium chloride 0.45%. 1000 milliLiter(s) (10 mL/Hr) IV Continuous <Continuous>  sodium chloride 0.9% lock flush 3 milliLiter(s) IV Push every 8 hours  sodium chloride 0.9%. 1000 milliLiter(s) (65 mL/Hr) IV Continuous <Continuous>    MEDICATIONS  (PRN):  acetaminophen   Tablet. 650 milliGRAM(s) Oral every 6 hours PRN Mild Pain (1 - 3)  dextrose Gel 1 Dose(s) Oral once PRN Blood Glucose LESS THAN 70 milliGRAM(s)/deciliter  fentaNYL    Injectable 25 MICROGram(s) IV Push every 6 hours PRN Severe Pain (7 - 10)  glucagon  Injectable 1 milliGRAM(s) IntraMuscular once PRN Glucose LESS THAN 70 milligrams/deciliter       PROBLEM LIST/ ASSESSMENT:  HEALTH ISSUES - PROBLEM Dx:      ,   Patient is a 85y old  Male who presents with a chief complaint of 3 vessel CAD (27 Jan 2018 23:05)     s/p ohs  acute changes include acute respiratory failure    My plan includes :  close hemodynamic, ventilatory and drain monitoring and management per post op routine    Monitor for arrhythmias and monitor parameters for organ perfusion  monitor neurologic status  Head of the bed should remain elevated to 45 deg .   chest PT and IS will be encouraged  monitor adequacy of oxygenation and ventilation and attempt to wean oxygen  Nutritional goals will be met using po eventually , ensure adequate caloric intake and montior the same  Stress ulcer and VTE prophylaxis will be achieved    Glycemic control is satisfactory  Electrolytes have been repleted as necessary and wound care has been carried out. Pain control has been achieved.   agressive physical therapy and early mobility and ambulation goals will be met   The family was updated about the course and plan  CRITICAL CARE TIME SPENT in evaluation and management, reassessments, review and interpretation of labs and x-rays, ventilator and hemodynamic management, formulating a plan and coordinating care: ___90____ MIN.  Time does not include procedural time.  CTICU ATTENDING     					    Walker Soto MD

## 2018-02-04 LAB
ALBUMIN SERPL ELPH-MCNC: 2.8 G/DL — LOW (ref 3.3–5)
ALP SERPL-CCNC: 139 U/L — HIGH (ref 40–120)
ALT FLD-CCNC: 9 U/L — LOW (ref 10–45)
ANION GAP SERPL CALC-SCNC: 16 MMOL/L — SIGNIFICANT CHANGE UP (ref 5–17)
APTT BLD: 31.9 SEC — SIGNIFICANT CHANGE UP (ref 27.5–37.4)
AST SERPL-CCNC: 32 U/L — SIGNIFICANT CHANGE UP (ref 10–40)
BILIRUB SERPL-MCNC: 0.8 MG/DL — SIGNIFICANT CHANGE UP (ref 0.2–1.2)
BUN SERPL-MCNC: 35 MG/DL — HIGH (ref 7–23)
CALCIUM SERPL-MCNC: 9 MG/DL — SIGNIFICANT CHANGE UP (ref 8.4–10.5)
CHLORIDE SERPL-SCNC: 104 MMOL/L — SIGNIFICANT CHANGE UP (ref 96–108)
CO2 SERPL-SCNC: 23 MMOL/L — SIGNIFICANT CHANGE UP (ref 22–31)
CREAT SERPL-MCNC: 2.1 MG/DL — HIGH (ref 0.5–1.3)
GLUCOSE BLDC GLUCOMTR-MCNC: 102 MG/DL — HIGH (ref 70–99)
GLUCOSE BLDC GLUCOMTR-MCNC: 106 MG/DL — HIGH (ref 70–99)
GLUCOSE BLDC GLUCOMTR-MCNC: 110 MG/DL — HIGH (ref 70–99)
GLUCOSE BLDC GLUCOMTR-MCNC: 99 MG/DL — SIGNIFICANT CHANGE UP (ref 70–99)
GLUCOSE SERPL-MCNC: 108 MG/DL — HIGH (ref 70–99)
HCT VFR BLD CALC: 28 % — LOW (ref 39–50)
HGB BLD-MCNC: 9.5 G/DL — LOW (ref 13–17)
INR BLD: 1.16 — SIGNIFICANT CHANGE UP (ref 0.88–1.16)
MAGNESIUM SERPL-MCNC: 2.3 MG/DL — SIGNIFICANT CHANGE UP (ref 1.6–2.6)
MCHC RBC-ENTMCNC: 29.4 PG — SIGNIFICANT CHANGE UP (ref 27–34)
MCHC RBC-ENTMCNC: 33.9 G/DL — SIGNIFICANT CHANGE UP (ref 32–36)
MCV RBC AUTO: 86.7 FL — SIGNIFICANT CHANGE UP (ref 80–100)
PHOSPHATE SERPL-MCNC: 3 MG/DL — SIGNIFICANT CHANGE UP (ref 2.5–4.5)
PLATELET # BLD AUTO: 205 K/UL — SIGNIFICANT CHANGE UP (ref 150–400)
POTASSIUM SERPL-MCNC: 4.4 MMOL/L — SIGNIFICANT CHANGE UP (ref 3.5–5.3)
POTASSIUM SERPL-SCNC: 4.4 MMOL/L — SIGNIFICANT CHANGE UP (ref 3.5–5.3)
PROT SERPL-MCNC: 5.9 G/DL — LOW (ref 6–8.3)
PROTHROM AB SERPL-ACNC: 12.9 SEC — HIGH (ref 9.8–12.7)
RBC # BLD: 3.23 M/UL — LOW (ref 4.2–5.8)
RBC # FLD: 14.8 % — SIGNIFICANT CHANGE UP (ref 10.3–16.9)
SODIUM SERPL-SCNC: 143 MMOL/L — SIGNIFICANT CHANGE UP (ref 135–145)
WBC # BLD: 9.6 K/UL — SIGNIFICANT CHANGE UP (ref 3.8–10.5)
WBC # FLD AUTO: 9.6 K/UL — SIGNIFICANT CHANGE UP (ref 3.8–10.5)

## 2018-02-04 PROCEDURE — 32555 ASPIRATE PLEURA W/ IMAGING: CPT | Mod: LT

## 2018-02-04 PROCEDURE — 71045 X-RAY EXAM CHEST 1 VIEW: CPT | Mod: 26,76

## 2018-02-04 RX ORDER — FUROSEMIDE 40 MG
40 TABLET ORAL ONCE
Qty: 0 | Refills: 0 | Status: COMPLETED | OUTPATIENT
Start: 2018-02-04 | End: 2018-02-04

## 2018-02-04 RX ORDER — SODIUM CHLORIDE 9 MG/ML
3 INJECTION INTRAMUSCULAR; INTRAVENOUS; SUBCUTANEOUS EVERY 8 HOURS
Qty: 0 | Refills: 0 | Status: DISCONTINUED | OUTPATIENT
Start: 2018-02-04 | End: 2018-02-08

## 2018-02-04 RX ADMIN — PANTOPRAZOLE SODIUM 40 MILLIGRAM(S): 20 TABLET, DELAYED RELEASE ORAL at 07:12

## 2018-02-04 RX ADMIN — HEPARIN SODIUM 5000 UNIT(S): 5000 INJECTION INTRAVENOUS; SUBCUTANEOUS at 15:17

## 2018-02-04 RX ADMIN — CEFTRIAXONE 2000 MILLIGRAM(S): 500 INJECTION, POWDER, FOR SOLUTION INTRAMUSCULAR; INTRAVENOUS at 22:16

## 2018-02-04 RX ADMIN — Medication 40 MILLIGRAM(S): at 07:12

## 2018-02-04 RX ADMIN — Medication 25 MILLIGRAM(S): at 13:21

## 2018-02-04 RX ADMIN — HEPARIN SODIUM 5000 UNIT(S): 5000 INJECTION INTRAVENOUS; SUBCUTANEOUS at 07:12

## 2018-02-04 RX ADMIN — Medication 1 DROP(S): at 15:18

## 2018-02-04 RX ADMIN — AMIODARONE HYDROCHLORIDE 400 MILLIGRAM(S): 400 TABLET ORAL at 22:06

## 2018-02-04 RX ADMIN — Medication 1 DROP(S): at 22:16

## 2018-02-04 RX ADMIN — Medication 1 DROP(S): at 14:00

## 2018-02-04 RX ADMIN — AMIODARONE HYDROCHLORIDE 400 MILLIGRAM(S): 400 TABLET ORAL at 07:12

## 2018-02-04 RX ADMIN — Medication 1 DROP(S): at 07:13

## 2018-02-04 RX ADMIN — SODIUM CHLORIDE 3 MILLILITER(S): 9 INJECTION INTRAMUSCULAR; INTRAVENOUS; SUBCUTANEOUS at 22:22

## 2018-02-04 RX ADMIN — ATORVASTATIN CALCIUM 80 MILLIGRAM(S): 80 TABLET, FILM COATED ORAL at 22:09

## 2018-02-04 RX ADMIN — HEPARIN SODIUM 5000 UNIT(S): 5000 INJECTION INTRAVENOUS; SUBCUTANEOUS at 22:08

## 2018-02-04 RX ADMIN — SODIUM CHLORIDE 3 MILLILITER(S): 9 INJECTION INTRAMUSCULAR; INTRAVENOUS; SUBCUTANEOUS at 13:47

## 2018-02-04 RX ADMIN — Medication 81 MILLIGRAM(S): at 13:22

## 2018-02-04 RX ADMIN — Medication 25 MILLIGRAM(S): at 18:12

## 2018-02-04 RX ADMIN — Medication 25 MILLIGRAM(S): at 07:12

## 2018-02-04 RX ADMIN — Medication 4 MILLIGRAM(S): at 22:07

## 2018-02-04 RX ADMIN — Medication 25 MILLIGRAM(S): at 23:01

## 2018-02-04 RX ADMIN — SODIUM CHLORIDE 3 MILLILITER(S): 9 INJECTION INTRAMUSCULAR; INTRAVENOUS; SUBCUTANEOUS at 07:13

## 2018-02-04 RX ADMIN — AMIODARONE HYDROCHLORIDE 400 MILLIGRAM(S): 400 TABLET ORAL at 15:17

## 2018-02-04 NOTE — PROGRESS NOTE ADULT - SUBJECTIVE AND OBJECTIVE BOX
CTICU  CRITICAL  CARE  attending     Hand off received 					   Pertinent clinical, laboratory, radiographic, hemodynamic, echocardiographic, respiratory data, microbiologic data and chart were reviewed and analyzed frequently throughout the course of the day and night  Patient seen and examined with CTS/ SH attending at bedside  Pt is a 85y , Male, HEALTH ISSUES - PROBLEM Dx:      , FAMILY HISTORY:  Family history of heart disease (Father)  PAST MEDICAL & SURGICAL HISTORY:  Hemorrhoids  Hypercholesterolemia  Benign prostate hyperplasia  Hypertension  History of tonsillectomy  Status post cataract extraction: bilateral eyes  AAA (abdominal aortic aneurysm)    Patient is a 85y old  Male who presents with a chief complaint of 3 vessel CAD (27 Jan 2018 23:05)      14 system review was unremarkable  acute changes include acute respiratory failure  Vital signs, hemodynamic and respiratory parameters were reviewed from the bedside nursing flowsheet.  ICU Vital Signs Last 24 Hrs  T(C): 36.8 (04 Feb 2018 17:32), Max: 37.6 (03 Feb 2018 18:02)  T(F): 98.3 (04 Feb 2018 17:32), Max: 99.7 (03 Feb 2018 18:02)  HR: 66 (04 Feb 2018 15:00) (66 - 78)  BP: 136/47 (04 Feb 2018 15:00) (111/60 - 167/70)  BP(mean): 75 (04 Feb 2018 15:00) (73 - 116)  ABP: --  ABP(mean): --  RR: 18 (04 Feb 2018 11:00) (16 - 22)  SpO2: 96% (04 Feb 2018 15:00) (92% - 98%)    Adult Advanced Hemodynamics Last 24 Hrs  CVP(mm Hg): --  CVP(cm H2O): --  CO: --  CI: --  PA: --  PA(mean): --  PCWP: --  SVR: --  SVRI: --  PVR: --  PVRI: --,     Intake and output was reviewed and the fluid balance was calculated  Daily     Daily   I&O's Summary    03 Feb 2018 07:01  -  04 Feb 2018 07:00  --------------------------------------------------------  IN: 700 mL / OUT: 1665 mL / NET: -965 mL    04 Feb 2018 07:01  -  04 Feb 2018 17:32  --------------------------------------------------------  IN: 0 mL / OUT: 875 mL / NET: -875 mL        All lines and drain sites were assessed  [facility]      POCT Blood Glucose.: 106 mg/dL (04 Feb 2018 16:56)    No acute change in mental status  Auscultation of the chest reveals equal bs  Abdomen is soft  Extremities are warm and well perfused  Wounds appear clean and unremarkable  Antibiotics are periop    labs  CBC Full  -  ( 04 Feb 2018 03:26 )  WBC Count : 9.6 K/uL  Hemoglobin : 9.5 g/dL  Hematocrit : 28.0 %  Platelet Count - Automated : 205 K/uL  Mean Cell Volume : 86.7 fL  Mean Cell Hemoglobin : 29.4 pg  Mean Cell Hemoglobin Concentration : 33.9 g/dL  Auto Neutrophil # : x  Auto Lymphocyte # : x  Auto Monocyte # : x  Auto Eosinophil # : x  Auto Basophil # : x  Auto Neutrophil % : x  Auto Lymphocyte % : x  Auto Monocyte % : x  Auto Eosinophil % : x  Auto Basophil % : x    02-04    143  |  104  |  35<H>  ----------------------------<  108<H>  4.4   |  23  |  2.10<H>    Ca    9.0      04 Feb 2018 03:26  Phos  3.0     02-04  Mg     2.3     02-04    TPro  5.9<L>  /  Alb  2.8<L>  /  TBili  0.8  /  DBili  x   /  AST  32  /  ALT  9<L>  /  AlkPhos  139<H>  02-04    PT/INR - ( 04 Feb 2018 03:26 )   PT: 12.9 sec;   INR: 1.16          PTT - ( 04 Feb 2018 03:26 )  PTT:31.9 sec  The current medications were reviewed   MEDICATIONS  (STANDING):  amiodarone    Tablet 400 milliGRAM(s) Oral every 8 hours  aspirin enteric coated 81 milliGRAM(s) Oral daily  atorvastatin 80 milliGRAM(s) Oral at bedtime  cefTRIAXone Injectable. 2000 milliGRAM(s) IV Push every 24 hours  dexmedetomidine Infusion 0.5 MICROgram(s)/kG/Hr (10.9 mL/Hr) IV Continuous <Continuous>  dextrose 5%. 1000 milliLiter(s) (50 mL/Hr) IV Continuous <Continuous>  dextrose 50% Injectable 12.5 Gram(s) IV Push once  dextrose 50% Injectable 25 Gram(s) IV Push once  dextrose 50% Injectable 25 Gram(s) IV Push once  docusate sodium 100 milliGRAM(s) Oral two times a day  doxazosin 4 milliGRAM(s) Oral at bedtime  heparin  Injectable 5000 Unit(s) SubCutaneous every 8 hours  insulin lispro (HumaLOG) corrective regimen sliding scale   SubCutaneous Before meals and at bedtime  ketorolac 0.5% Ophthalmic Solution 1 Drop(s) Right EYE three times a day  metoprolol     tartrate 25 milliGRAM(s) Oral every 6 hours  ofloxacin 0.3% Solution 1 Drop(s) Right EYE three times a day  pantoprazole    Tablet 40 milliGRAM(s) Oral before breakfast  polyethylene glycol 3350 17 Gram(s) Oral daily  prednisoLONE  forte 1% Suspension 1 Drop(s) Right EYE three times a day  senna 2 Tablet(s) Oral at bedtime  sodium chloride 0.45%. 1000 milliLiter(s) (10 mL/Hr) IV Continuous <Continuous>  sodium chloride 0.9% lock flush 3 milliLiter(s) IV Push every 8 hours  sodium chloride 0.9%. 1000 milliLiter(s) (65 mL/Hr) IV Continuous <Continuous>    MEDICATIONS  (PRN):  acetaminophen   Tablet. 650 milliGRAM(s) Oral every 6 hours PRN Mild Pain (1 - 3)  dextrose Gel 1 Dose(s) Oral once PRN Blood Glucose LESS THAN 70 milliGRAM(s)/deciliter  fentaNYL    Injectable 25 MICROGram(s) IV Push every 6 hours PRN Severe Pain (7 - 10)  glucagon  Injectable 1 milliGRAM(s) IntraMuscular once PRN Glucose LESS THAN 70 milligrams/deciliter       PROBLEM LIST/ ASSESSMENT:  HEALTH ISSUES - PROBLEM Dx:      ,   Patient is a 85y old  Male who presents with a chief complaint of 3 vessel CAD (27 Jan 2018 23:05)     s/p   acute changes include acute respiratory failure    My plan includes :  close hemodynamic, ventilatory and drain monitoring and management per post op routine    Monitor for arrhythmias and monitor parameters for organ perfusion  monitor neurologic status  Head of the bed should remain elevated to 45 deg .   chest PT and IS will be encouraged  monitor adequacy of oxygenation and ventilation and attempt to wean oxygen  Nutritional goals will be met using po eventually , ensure adequate caloric intake and montior the same  Stress ulcer and VTE prophylaxis will be achieved    Glycemic control is satisfactory  Electrolytes have been repleted as necessary and wound care has been carried out. Pain control has been achieved.   agressive physical therapy and early mobility and ambulation goals will be met   The family was updated about the course and plan  CRITICAL CARE TIME SPENT in evaluation and management, reassessments, review and interpretation of labs and x-rays, ventilator and hemodynamic management, formulating a plan and coordinating care: ___90____ MIN.  Time does not include procedural time.  CTICU ATTENDING     					    Enoch Sharif MD

## 2018-02-05 LAB
ALBUMIN SERPL ELPH-MCNC: 2.7 G/DL — LOW (ref 3.3–5)
ALP SERPL-CCNC: 168 U/L — HIGH (ref 40–120)
ALT FLD-CCNC: 19 U/L — SIGNIFICANT CHANGE UP (ref 10–45)
ANION GAP SERPL CALC-SCNC: 11 MMOL/L — SIGNIFICANT CHANGE UP (ref 5–17)
ANION GAP SERPL CALC-SCNC: 11 MMOL/L — SIGNIFICANT CHANGE UP (ref 5–17)
ANION GAP SERPL CALC-SCNC: 13 MMOL/L — SIGNIFICANT CHANGE UP (ref 5–17)
APTT BLD: 31.2 SEC — SIGNIFICANT CHANGE UP (ref 27.5–37.4)
AST SERPL-CCNC: 38 U/L — SIGNIFICANT CHANGE UP (ref 10–40)
BASOPHILS NFR BLD AUTO: 0.2 % — SIGNIFICANT CHANGE UP (ref 0–2)
BILIRUB SERPL-MCNC: 0.6 MG/DL — SIGNIFICANT CHANGE UP (ref 0.2–1.2)
BUN SERPL-MCNC: 41 MG/DL — HIGH (ref 7–23)
BUN SERPL-MCNC: 44 MG/DL — HIGH (ref 7–23)
BUN SERPL-MCNC: 49 MG/DL — HIGH (ref 7–23)
CALCIUM SERPL-MCNC: 9.2 MG/DL — SIGNIFICANT CHANGE UP (ref 8.4–10.5)
CALCIUM SERPL-MCNC: 9.4 MG/DL — SIGNIFICANT CHANGE UP (ref 8.4–10.5)
CALCIUM SERPL-MCNC: 9.4 MG/DL — SIGNIFICANT CHANGE UP (ref 8.4–10.5)
CHLORIDE SERPL-SCNC: 101 MMOL/L — SIGNIFICANT CHANGE UP (ref 96–108)
CHLORIDE SERPL-SCNC: 102 MMOL/L — SIGNIFICANT CHANGE UP (ref 96–108)
CHLORIDE SERPL-SCNC: 102 MMOL/L — SIGNIFICANT CHANGE UP (ref 96–108)
CO2 SERPL-SCNC: 25 MMOL/L — SIGNIFICANT CHANGE UP (ref 22–31)
CO2 SERPL-SCNC: 27 MMOL/L — SIGNIFICANT CHANGE UP (ref 22–31)
CO2 SERPL-SCNC: 30 MMOL/L — SIGNIFICANT CHANGE UP (ref 22–31)
CREAT SERPL-MCNC: 2.2 MG/DL — HIGH (ref 0.5–1.3)
CREAT SERPL-MCNC: 2.27 MG/DL — HIGH (ref 0.5–1.3)
CREAT SERPL-MCNC: 2.36 MG/DL — HIGH (ref 0.5–1.3)
EOSINOPHIL NFR BLD AUTO: 4.2 % — SIGNIFICANT CHANGE UP (ref 0–6)
GLUCOSE BLDC GLUCOMTR-MCNC: 126 MG/DL — HIGH (ref 70–99)
GLUCOSE BLDC GLUCOMTR-MCNC: 137 MG/DL — HIGH (ref 70–99)
GLUCOSE BLDC GLUCOMTR-MCNC: 155 MG/DL — HIGH (ref 70–99)
GLUCOSE BLDC GLUCOMTR-MCNC: 89 MG/DL — SIGNIFICANT CHANGE UP (ref 70–99)
GLUCOSE SERPL-MCNC: 106 MG/DL — HIGH (ref 70–99)
GLUCOSE SERPL-MCNC: 120 MG/DL — HIGH (ref 70–99)
GLUCOSE SERPL-MCNC: 139 MG/DL — HIGH (ref 70–99)
HCT VFR BLD CALC: 27.4 % — LOW (ref 39–50)
HGB BLD-MCNC: 9.3 G/DL — LOW (ref 13–17)
INR BLD: 1.18 — HIGH (ref 0.88–1.16)
LYMPHOCYTES # BLD AUTO: 15.7 % — SIGNIFICANT CHANGE UP (ref 13–44)
MAGNESIUM SERPL-MCNC: 2.2 MG/DL — SIGNIFICANT CHANGE UP (ref 1.6–2.6)
MCHC RBC-ENTMCNC: 29.2 PG — SIGNIFICANT CHANGE UP (ref 27–34)
MCHC RBC-ENTMCNC: 33.9 G/DL — SIGNIFICANT CHANGE UP (ref 32–36)
MCV RBC AUTO: 86.2 FL — SIGNIFICANT CHANGE UP (ref 80–100)
MONOCYTES NFR BLD AUTO: 8.7 % — SIGNIFICANT CHANGE UP (ref 2–14)
NEUTROPHILS NFR BLD AUTO: 71.2 % — SIGNIFICANT CHANGE UP (ref 43–77)
PHOSPHATE SERPL-MCNC: 3.2 MG/DL — SIGNIFICANT CHANGE UP (ref 2.5–4.5)
PLATELET # BLD AUTO: 223 K/UL — SIGNIFICANT CHANGE UP (ref 150–400)
POTASSIUM SERPL-MCNC: 3.7 MMOL/L — SIGNIFICANT CHANGE UP (ref 3.5–5.3)
POTASSIUM SERPL-MCNC: 4.1 MMOL/L — SIGNIFICANT CHANGE UP (ref 3.5–5.3)
POTASSIUM SERPL-MCNC: 4.5 MMOL/L — SIGNIFICANT CHANGE UP (ref 3.5–5.3)
POTASSIUM SERPL-SCNC: 3.7 MMOL/L — SIGNIFICANT CHANGE UP (ref 3.5–5.3)
POTASSIUM SERPL-SCNC: 4.1 MMOL/L — SIGNIFICANT CHANGE UP (ref 3.5–5.3)
POTASSIUM SERPL-SCNC: 4.5 MMOL/L — SIGNIFICANT CHANGE UP (ref 3.5–5.3)
PROT SERPL-MCNC: 6.1 G/DL — SIGNIFICANT CHANGE UP (ref 6–8.3)
PROTHROM AB SERPL-ACNC: 13.1 SEC — HIGH (ref 9.8–12.7)
RBC # BLD: 3.18 M/UL — LOW (ref 4.2–5.8)
RBC # FLD: 14.7 % — SIGNIFICANT CHANGE UP (ref 10.3–16.9)
SODIUM SERPL-SCNC: 140 MMOL/L — SIGNIFICANT CHANGE UP (ref 135–145)
SODIUM SERPL-SCNC: 140 MMOL/L — SIGNIFICANT CHANGE UP (ref 135–145)
SODIUM SERPL-SCNC: 142 MMOL/L — SIGNIFICANT CHANGE UP (ref 135–145)
WBC # BLD: 9.6 K/UL — SIGNIFICANT CHANGE UP (ref 3.8–10.5)
WBC # FLD AUTO: 9.6 K/UL — SIGNIFICANT CHANGE UP (ref 3.8–10.5)

## 2018-02-05 PROCEDURE — 71045 X-RAY EXAM CHEST 1 VIEW: CPT | Mod: 26

## 2018-02-05 RX ORDER — POTASSIUM CHLORIDE 20 MEQ
40 PACKET (EA) ORAL ONCE
Qty: 0 | Refills: 0 | Status: COMPLETED | OUTPATIENT
Start: 2018-02-05 | End: 2018-02-05

## 2018-02-05 RX ORDER — AMLODIPINE BESYLATE 2.5 MG/1
5 TABLET ORAL DAILY
Qty: 0 | Refills: 0 | Status: DISCONTINUED | OUTPATIENT
Start: 2018-02-05 | End: 2018-02-07

## 2018-02-05 RX ADMIN — PANTOPRAZOLE SODIUM 40 MILLIGRAM(S): 20 TABLET, DELAYED RELEASE ORAL at 06:24

## 2018-02-05 RX ADMIN — Medication 25 MILLIGRAM(S): at 06:22

## 2018-02-05 RX ADMIN — Medication 25 MILLIGRAM(S): at 18:06

## 2018-02-05 RX ADMIN — Medication 1 DROP(S): at 21:41

## 2018-02-05 RX ADMIN — Medication 1 DROP(S): at 13:05

## 2018-02-05 RX ADMIN — Medication 81 MILLIGRAM(S): at 13:05

## 2018-02-05 RX ADMIN — AMIODARONE HYDROCHLORIDE 400 MILLIGRAM(S): 400 TABLET ORAL at 06:22

## 2018-02-05 RX ADMIN — HEPARIN SODIUM 5000 UNIT(S): 5000 INJECTION INTRAVENOUS; SUBCUTANEOUS at 21:42

## 2018-02-05 RX ADMIN — Medication 25 MILLIGRAM(S): at 23:14

## 2018-02-05 RX ADMIN — Medication 1 DROP(S): at 06:25

## 2018-02-05 RX ADMIN — AMIODARONE HYDROCHLORIDE 400 MILLIGRAM(S): 400 TABLET ORAL at 13:05

## 2018-02-05 RX ADMIN — Medication 4 MILLIGRAM(S): at 23:15

## 2018-02-05 RX ADMIN — Medication 2: at 21:38

## 2018-02-05 RX ADMIN — SODIUM CHLORIDE 3 MILLILITER(S): 9 INJECTION INTRAMUSCULAR; INTRAVENOUS; SUBCUTANEOUS at 06:21

## 2018-02-05 RX ADMIN — HEPARIN SODIUM 5000 UNIT(S): 5000 INJECTION INTRAVENOUS; SUBCUTANEOUS at 06:24

## 2018-02-05 RX ADMIN — AMLODIPINE BESYLATE 5 MILLIGRAM(S): 2.5 TABLET ORAL at 20:30

## 2018-02-05 RX ADMIN — Medication 1 DROP(S): at 06:24

## 2018-02-05 RX ADMIN — CEFTRIAXONE 2000 MILLIGRAM(S): 500 INJECTION, POWDER, FOR SOLUTION INTRAMUSCULAR; INTRAVENOUS at 21:39

## 2018-02-05 RX ADMIN — AMIODARONE HYDROCHLORIDE 400 MILLIGRAM(S): 400 TABLET ORAL at 21:38

## 2018-02-05 RX ADMIN — Medication 25 MILLIGRAM(S): at 13:05

## 2018-02-05 RX ADMIN — SODIUM CHLORIDE 3 MILLILITER(S): 9 INJECTION INTRAMUSCULAR; INTRAVENOUS; SUBCUTANEOUS at 21:29

## 2018-02-05 RX ADMIN — ATORVASTATIN CALCIUM 80 MILLIGRAM(S): 80 TABLET, FILM COATED ORAL at 21:38

## 2018-02-05 RX ADMIN — Medication 40 MILLIEQUIVALENT(S): at 09:05

## 2018-02-05 RX ADMIN — Medication 1 DROP(S): at 21:40

## 2018-02-05 RX ADMIN — SODIUM CHLORIDE 3 MILLILITER(S): 9 INJECTION INTRAMUSCULAR; INTRAVENOUS; SUBCUTANEOUS at 18:08

## 2018-02-05 NOTE — PROGRESS NOTE ADULT - ASSESSMENT
A/P: 86 y/o M pod#5 s/p MIDCAB  CVS: post-op Afib, now HD stable in NSR   - c/w amio load and metop 25 q 6, cannot titrate up further 2/2 HR in 60's   - c/w home dose of doxazosin   - c/w Asa 81 and lipitor  Pulm: satting well on RA   - encouraged IS and ambulation   - PA/lat in AM  Renal: Acute on CKD (b/l Cr 1.77)   - Cr trending up to 2.3 in AM   - encouraged PO hydration, repeat Cr in PM  ID: afebrile, no leukocytosis, currently being treated for E. Coli bacteremia at OSH   - c/w ceftrixone 2g daily while inpatient   - will complete Abx with keflex at home  Dispo: PT recommends rehab, patient now agreeable after discussing with family

## 2018-02-05 NOTE — PROGRESS NOTE ADULT - SUBJECTIVE AND OBJECTIVE BOX
Patient discussed on morning rounds with Dr. Galvez     [facility]    SUBJECTIVE ASSESSMENT:    Pt reports pain is well controlled, denies dyspnea, SOB, fevers, chills, N/V or abdominal pain.  He ambulated in hallway with assistance x2 today.    Vital Signs Last 24 Hrs  T(C): 36.6 (05 Feb 2018 09:57), Max: 37.6 (04 Feb 2018 21:19)  T(F): 97.8 (05 Feb 2018 09:57), Max: 99.6 (04 Feb 2018 21:19)  HR: 64 (05 Feb 2018 12:50) (56 - 72)  BP: 163/70 (05 Feb 2018 12:50) (136/47 - 166/67)  BP(mean): 100 (05 Feb 2018 12:50) (75 - 115)  RR: 20 (05 Feb 2018 12:50) (0 - 24)  SpO2: 95% (05 Feb 2018 12:50) (94% - 98%)  I&O's Detail    04 Feb 2018 07:01  -  05 Feb 2018 07:00  --------------------------------------------------------  IN:    Oral Fluid: 990 mL  Total IN: 990 mL    OUT:    Voided: 1475 mL  Total OUT: 1475 mL    Total NET: -485 mL      05 Feb 2018 07:01  -  05 Feb 2018 14:01  --------------------------------------------------------  IN:    Oral Fluid: 500 mL  Total IN: 500 mL    OUT:  Total OUT: 0 mL    Total NET: 500 mL    CHEST TUBE:  No.   CHAD DRAIN:  No.  EPICARDIAL WIRES: No.  TIE DOWNS: Yesx1.  LANE: No.    PHYSICAL EXAM:    General: NAD    Neurological:    Cardiovascular:    Respiratory:    Gastrointestinal:    Extremities:    Vascular:    Incision Sites:    LABS:                        9.3    9.6   )-----------( 223      ( 05 Feb 2018 04:55 )             27.4       COUMADIN:  Yes/No. REASON: .    PT/INR - ( 05 Feb 2018 04:51 )   PT: 13.1 sec;   INR: 1.18          PTT - ( 05 Feb 2018 04:51 )  PTT:31.2 sec    02-05    142  |  101  |  44<H>  ----------------------------<  120<H>  4.1   |  30  |  2.36<H>    Ca    9.4      05 Feb 2018 12:35  Phos  3.2     02-05  Mg     2.2     02-05    TPro  6.1  /  Alb  2.7<L>  /  TBili  0.6  /  DBili  x   /  AST  38  /  ALT  19  /  AlkPhos  168<H>  02-05          MEDICATIONS  (STANDING):  amiodarone    Tablet 400 milliGRAM(s) Oral every 8 hours  aspirin enteric coated 81 milliGRAM(s) Oral daily  atorvastatin 80 milliGRAM(s) Oral at bedtime  cefTRIAXone Injectable. 2000 milliGRAM(s) IV Push every 24 hours  dextrose 5%. 1000 milliLiter(s) (50 mL/Hr) IV Continuous <Continuous>  dextrose 50% Injectable 12.5 Gram(s) IV Push once  dextrose 50% Injectable 25 Gram(s) IV Push once  dextrose 50% Injectable 25 Gram(s) IV Push once  docusate sodium 100 milliGRAM(s) Oral two times a day  doxazosin 4 milliGRAM(s) Oral at bedtime  heparin  Injectable 5000 Unit(s) SubCutaneous every 8 hours  insulin lispro (HumaLOG) corrective regimen sliding scale   SubCutaneous Before meals and at bedtime  ketorolac 0.5% Ophthalmic Solution 1 Drop(s) Right EYE three times a day  metoprolol     tartrate 25 milliGRAM(s) Oral every 6 hours  ofloxacin 0.3% Solution 1 Drop(s) Right EYE three times a day  pantoprazole    Tablet 40 milliGRAM(s) Oral before breakfast  polyethylene glycol 3350 17 Gram(s) Oral daily  prednisoLONE  forte 1% Suspension 1 Drop(s) Right EYE three times a day  senna 2 Tablet(s) Oral at bedtime  sodium chloride 0.45%. 1000 milliLiter(s) (10 mL/Hr) IV Continuous <Continuous>  sodium chloride 0.9% lock flush 3 milliLiter(s) IV Push every 8 hours  sodium chloride 0.9%. 1000 milliLiter(s) (65 mL/Hr) IV Continuous <Continuous>    MEDICATIONS  (PRN):  acetaminophen   Tablet. 650 milliGRAM(s) Oral every 6 hours PRN Mild Pain (1 - 3)  dextrose Gel 1 Dose(s) Oral once PRN Blood Glucose LESS THAN 70 milliGRAM(s)/deciliter  glucagon  Injectable 1 milliGRAM(s) IntraMuscular once PRN Glucose LESS THAN 70 milligrams/deciliter        RADIOLOGY & ADDITIONAL TESTS: Patient discussed on morning rounds with Dr. Galvez     Operation / Date: 1/31/18 MIDCAB    SUBJECTIVE ASSESSMENT:    Pt reports pain is well controlled, denies dyspnea, SOB, fevers, chills, N/V or abdominal pain.  He ambulated in hallway with assistance x2 today.  Patient admits to not drinking adequate fluids 2/2 incontinence to urine, after stressing importance of hydration for renal function, patient agrees to increase intake.    Vital Signs Last 24 Hrs  T(C): 36.6 (05 Feb 2018 09:57), Max: 37.6 (04 Feb 2018 21:19)  T(F): 97.8 (05 Feb 2018 09:57), Max: 99.6 (04 Feb 2018 21:19)  HR: 64 (05 Feb 2018 12:50) (56 - 72)  BP: 163/70 (05 Feb 2018 12:50) (136/47 - 166/67)  BP(mean): 100 (05 Feb 2018 12:50) (75 - 115)  RR: 20 (05 Feb 2018 12:50) (0 - 24)  SpO2: 95% (05 Feb 2018 12:50) (94% - 98%)  I&O's Detail    04 Feb 2018 07:01  -  05 Feb 2018 07:00  --------------------------------------------------------  IN:    Oral Fluid: 990 mL  Total IN: 990 mL    OUT:    Voided: 1475 mL  Total OUT: 1475 mL    Total NET: -485 mL      05 Feb 2018 07:01  -  05 Feb 2018 14:01  --------------------------------------------------------  IN:    Oral Fluid: 500 mL  Total IN: 500 mL    OUT:  Total OUT: 0 mL    Total NET: 500 mL    CHEST TUBE:  No.   CHAD DRAIN:  No.  EPICARDIAL WIRES: No.  TIE DOWNS: Yesx1.  LANE: No.    PHYSICAL EXAM:    General: NAD    Neurological: A&Ox3    Cardiovascular: S1S2 RRR    Respiratory: CTA b/l no W/R/R    Gastrointestinal: soft NT/nD +BS    Extremities: warm, no edema    Vascular: warm and well perfused bilaterally    Incision Sites: L anterior thoracotomy C/D/I    LABS:                        9.3    9.6   )-----------( 223      ( 05 Feb 2018 04:55 )             27.4       COUMADIN:  No.     PT/INR - ( 05 Feb 2018 04:51 )   PT: 13.1 sec;   INR: 1.18          PTT - ( 05 Feb 2018 04:51 )  PTT:31.2 sec    02-05    142  |  101  |  44<H>  ----------------------------<  120<H>  4.1   |  30  |  2.36<H>    Ca    9.4      05 Feb 2018 12:35  Phos  3.2     02-05  Mg     2.2     02-05    TPro  6.1  /  Alb  2.7<L>  /  TBili  0.6  /  DBili  x   /  AST  38  /  ALT  19  /  AlkPhos  168<H>  02-05          MEDICATIONS  (STANDING):  amiodarone    Tablet 400 milliGRAM(s) Oral every 8 hours  aspirin enteric coated 81 milliGRAM(s) Oral daily  atorvastatin 80 milliGRAM(s) Oral at bedtime  cefTRIAXone Injectable. 2000 milliGRAM(s) IV Push every 24 hours  dextrose 5%. 1000 milliLiter(s) (50 mL/Hr) IV Continuous <Continuous>  dextrose 50% Injectable 12.5 Gram(s) IV Push once  dextrose 50% Injectable 25 Gram(s) IV Push once  dextrose 50% Injectable 25 Gram(s) IV Push once  docusate sodium 100 milliGRAM(s) Oral two times a day  doxazosin 4 milliGRAM(s) Oral at bedtime  heparin  Injectable 5000 Unit(s) SubCutaneous every 8 hours  insulin lispro (HumaLOG) corrective regimen sliding scale   SubCutaneous Before meals and at bedtime  ketorolac 0.5% Ophthalmic Solution 1 Drop(s) Right EYE three times a day  metoprolol     tartrate 25 milliGRAM(s) Oral every 6 hours  ofloxacin 0.3% Solution 1 Drop(s) Right EYE three times a day  pantoprazole    Tablet 40 milliGRAM(s) Oral before breakfast  polyethylene glycol 3350 17 Gram(s) Oral daily  prednisoLONE  forte 1% Suspension 1 Drop(s) Right EYE three times a day  senna 2 Tablet(s) Oral at bedtime  sodium chloride 0.45%. 1000 milliLiter(s) (10 mL/Hr) IV Continuous <Continuous>  sodium chloride 0.9% lock flush 3 milliLiter(s) IV Push every 8 hours  sodium chloride 0.9%. 1000 milliLiter(s) (65 mL/Hr) IV Continuous <Continuous>    MEDICATIONS  (PRN):  acetaminophen   Tablet. 650 milliGRAM(s) Oral every 6 hours PRN Mild Pain (1 - 3)  dextrose Gel 1 Dose(s) Oral once PRN Blood Glucose LESS THAN 70 milliGRAM(s)/deciliter  glucagon  Injectable 1 milliGRAM(s) IntraMuscular once PRN Glucose LESS THAN 70 milligrams/deciliter        RADIOLOGY & ADDITIONAL TESTS:  CXR: no effusions/infiltrates

## 2018-02-06 LAB
ANION GAP SERPL CALC-SCNC: 12 MMOL/L — SIGNIFICANT CHANGE UP (ref 5–17)
BUN SERPL-MCNC: 46 MG/DL — HIGH (ref 7–23)
CALCIUM SERPL-MCNC: 9.2 MG/DL — SIGNIFICANT CHANGE UP (ref 8.4–10.5)
CHLORIDE SERPL-SCNC: 105 MMOL/L — SIGNIFICANT CHANGE UP (ref 96–108)
CO2 SERPL-SCNC: 25 MMOL/L — SIGNIFICANT CHANGE UP (ref 22–31)
CREAT SERPL-MCNC: 2.06 MG/DL — HIGH (ref 0.5–1.3)
GLUCOSE BLDC GLUCOMTR-MCNC: 129 MG/DL — HIGH (ref 70–99)
GLUCOSE BLDC GLUCOMTR-MCNC: 138 MG/DL — HIGH (ref 70–99)
GLUCOSE BLDC GLUCOMTR-MCNC: 154 MG/DL — HIGH (ref 70–99)
GLUCOSE BLDC GLUCOMTR-MCNC: 94 MG/DL — SIGNIFICANT CHANGE UP (ref 70–99)
GLUCOSE SERPL-MCNC: 125 MG/DL — HIGH (ref 70–99)
HCT VFR BLD CALC: 30.1 % — LOW (ref 39–50)
HGB BLD-MCNC: 9.7 G/DL — LOW (ref 13–17)
MAGNESIUM SERPL-MCNC: 2.5 MG/DL — SIGNIFICANT CHANGE UP (ref 1.6–2.6)
MCHC RBC-ENTMCNC: 28.6 PG — SIGNIFICANT CHANGE UP (ref 27–34)
MCHC RBC-ENTMCNC: 32.2 G/DL — SIGNIFICANT CHANGE UP (ref 32–36)
MCV RBC AUTO: 88.8 FL — SIGNIFICANT CHANGE UP (ref 80–100)
PLATELET # BLD AUTO: 219 K/UL — SIGNIFICANT CHANGE UP (ref 150–400)
POTASSIUM SERPL-MCNC: 4.1 MMOL/L — SIGNIFICANT CHANGE UP (ref 3.5–5.3)
POTASSIUM SERPL-SCNC: 4.1 MMOL/L — SIGNIFICANT CHANGE UP (ref 3.5–5.3)
RBC # BLD: 3.39 M/UL — LOW (ref 4.2–5.8)
RBC # FLD: 15 % — SIGNIFICANT CHANGE UP (ref 10.3–16.9)
SODIUM SERPL-SCNC: 142 MMOL/L — SIGNIFICANT CHANGE UP (ref 135–145)
WBC # BLD: 9.8 K/UL — SIGNIFICANT CHANGE UP (ref 3.8–10.5)
WBC # FLD AUTO: 9.8 K/UL — SIGNIFICANT CHANGE UP (ref 3.8–10.5)

## 2018-02-06 PROCEDURE — 71045 X-RAY EXAM CHEST 1 VIEW: CPT | Mod: 26,59

## 2018-02-06 PROCEDURE — 32550 INSERT PLEURAL CATH: CPT

## 2018-02-06 PROCEDURE — 71046 X-RAY EXAM CHEST 2 VIEWS: CPT | Mod: 26

## 2018-02-06 RX ORDER — SODIUM CHLORIDE 9 MG/ML
1000 INJECTION INTRAMUSCULAR; INTRAVENOUS; SUBCUTANEOUS
Qty: 0 | Refills: 0 | Status: DISCONTINUED | OUTPATIENT
Start: 2018-02-06 | End: 2018-02-08

## 2018-02-06 RX ORDER — FUROSEMIDE 40 MG
40 TABLET ORAL DAILY
Qty: 0 | Refills: 0 | Status: DISCONTINUED | OUTPATIENT
Start: 2018-02-06 | End: 2018-02-06

## 2018-02-06 RX ORDER — LIDOCAINE 4 G/100G
1 CREAM TOPICAL ONCE
Qty: 0 | Refills: 0 | Status: COMPLETED | OUTPATIENT
Start: 2018-02-06 | End: 2018-02-06

## 2018-02-06 RX ORDER — ACETAMINOPHEN 500 MG
1000 TABLET ORAL ONCE
Qty: 0 | Refills: 0 | Status: COMPLETED | OUTPATIENT
Start: 2018-02-06 | End: 2018-02-06

## 2018-02-06 RX ADMIN — HEPARIN SODIUM 5000 UNIT(S): 5000 INJECTION INTRAVENOUS; SUBCUTANEOUS at 05:36

## 2018-02-06 RX ADMIN — SODIUM CHLORIDE 3 MILLILITER(S): 9 INJECTION INTRAMUSCULAR; INTRAVENOUS; SUBCUTANEOUS at 22:47

## 2018-02-06 RX ADMIN — SODIUM CHLORIDE 3 MILLILITER(S): 9 INJECTION INTRAMUSCULAR; INTRAVENOUS; SUBCUTANEOUS at 13:25

## 2018-02-06 RX ADMIN — Medication 25 MILLIGRAM(S): at 11:27

## 2018-02-06 RX ADMIN — Medication 2: at 22:46

## 2018-02-06 RX ADMIN — Medication 1000 MILLIGRAM(S): at 13:23

## 2018-02-06 RX ADMIN — AMIODARONE HYDROCHLORIDE 400 MILLIGRAM(S): 400 TABLET ORAL at 13:10

## 2018-02-06 RX ADMIN — Medication 400 MILLIGRAM(S): at 12:38

## 2018-02-06 RX ADMIN — Medication 1 DROP(S): at 21:27

## 2018-02-06 RX ADMIN — AMIODARONE HYDROCHLORIDE 400 MILLIGRAM(S): 400 TABLET ORAL at 05:36

## 2018-02-06 RX ADMIN — SODIUM CHLORIDE 50 MILLILITER(S): 9 INJECTION INTRAMUSCULAR; INTRAVENOUS; SUBCUTANEOUS at 12:05

## 2018-02-06 RX ADMIN — AMIODARONE HYDROCHLORIDE 400 MILLIGRAM(S): 400 TABLET ORAL at 21:26

## 2018-02-06 RX ADMIN — Medication 1 DROP(S): at 22:25

## 2018-02-06 RX ADMIN — Medication 1 DROP(S): at 13:21

## 2018-02-06 RX ADMIN — HEPARIN SODIUM 5000 UNIT(S): 5000 INJECTION INTRAVENOUS; SUBCUTANEOUS at 21:26

## 2018-02-06 RX ADMIN — ATORVASTATIN CALCIUM 80 MILLIGRAM(S): 80 TABLET, FILM COATED ORAL at 22:41

## 2018-02-06 RX ADMIN — LIDOCAINE 1 PATCH: 4 CREAM TOPICAL at 12:11

## 2018-02-06 RX ADMIN — Medication 25 MILLIGRAM(S): at 05:36

## 2018-02-06 RX ADMIN — SENNA PLUS 2 TABLET(S): 8.6 TABLET ORAL at 21:25

## 2018-02-06 RX ADMIN — Medication 81 MILLIGRAM(S): at 09:35

## 2018-02-06 RX ADMIN — Medication 1 DROP(S): at 05:36

## 2018-02-06 RX ADMIN — Medication 1 DROP(S): at 05:37

## 2018-02-06 RX ADMIN — AMLODIPINE BESYLATE 5 MILLIGRAM(S): 2.5 TABLET ORAL at 05:36

## 2018-02-06 RX ADMIN — CEFTRIAXONE 2000 MILLIGRAM(S): 500 INJECTION, POWDER, FOR SOLUTION INTRAMUSCULAR; INTRAVENOUS at 21:27

## 2018-02-06 RX ADMIN — SODIUM CHLORIDE 3 MILLILITER(S): 9 INJECTION INTRAMUSCULAR; INTRAVENOUS; SUBCUTANEOUS at 05:32

## 2018-02-06 RX ADMIN — Medication 4 MILLIGRAM(S): at 21:26

## 2018-02-06 RX ADMIN — Medication 1 DROP(S): at 13:28

## 2018-02-06 RX ADMIN — PANTOPRAZOLE SODIUM 40 MILLIGRAM(S): 20 TABLET, DELAYED RELEASE ORAL at 05:38

## 2018-02-06 RX ADMIN — HEPARIN SODIUM 5000 UNIT(S): 5000 INJECTION INTRAVENOUS; SUBCUTANEOUS at 13:21

## 2018-02-06 NOTE — PROCEDURE NOTE - NSINFORMCONSENT_GEN_A_CORE
Benefits, risks, and possible complications of procedure explained to patient/caregiver who verbalized understanding and gave verbal consent.
Benefits, risks, and possible complications of procedure explained to patient/caregiver who verbalized understanding and gave verbal consent.
Benefits, risks, and possible complications of procedure explained to patient/caregiver who verbalized understanding and gave written consent.

## 2018-02-06 NOTE — CHART NOTE - NSCHARTNOTEFT_GEN_A_CORE
Admitting Diagnosis:   Patient is a 85y old  Male who presents with a chief complaint of 3 vessel CAD (27 Jan 2018 23:05)  patient underwent robotic MIDCAB 1/31/18, OR course uncomplicated.  Post operative course complicated by post op afib, requiring PO amio load, has been in NSR since.  Today POD7 patient doing well. Awaiting placement to [facility].         PAST MEDICAL & SURGICAL HISTORY:  Hemorrhoids  Hypercholesterolemia  Benign prostate hyperplasia  Hypertension  History of tonsillectomy  Status post cataract extraction: bilateral eyes  AAA (abdominal aortic aneurysm)      Current Nutrition Order: DASH/TLC       PO Intake: Good (%) [   ]  Fair (50-75%) [  X ] Poor (<25%) [ X  ]    GI Issues: Denies N/V/D/C    Pain: NONE    Skin Integrity: w/ surgical incision (L anterior thoracotomy site)    Labs:   02-06    142  |  105  |  46<H>  ----------------------------<  125<H>  4.1   |  25  |  2.06<H>    Ca    9.2      06 Feb 2018 05:59  Phos  3.2     02-05  Mg     2.5     02-06    TPro  6.1  /  Alb  2.7<L>  /  TBili  0.6  /  DBili  x   /  AST  38  /  ALT  19  /  AlkPhos  168<H>  02-05    CAPILLARY BLOOD GLUCOSE      POCT Blood Glucose.: 138 mg/dL (06 Feb 2018 11:04)  POCT Blood Glucose.: 94 mg/dL (06 Feb 2018 05:19)  POCT Blood Glucose.: 155 mg/dL (05 Feb 2018 20:30)  POCT Blood Glucose.: 137 mg/dL (05 Feb 2018 17:00)      Medications:  MEDICATIONS  (STANDING):  amiodarone    Tablet 400 milliGRAM(s) Oral every 8 hours  amLODIPine   Tablet 5 milliGRAM(s) Oral daily  aspirin enteric coated 81 milliGRAM(s) Oral daily  atorvastatin 80 milliGRAM(s) Oral at bedtime  cefTRIAXone Injectable. 2000 milliGRAM(s) IV Push every 24 hours  dextrose 5%. 1000 milliLiter(s) (50 mL/Hr) IV Continuous <Continuous>  dextrose 50% Injectable 12.5 Gram(s) IV Push once  dextrose 50% Injectable 25 Gram(s) IV Push once  dextrose 50% Injectable 25 Gram(s) IV Push once  docusate sodium 100 milliGRAM(s) Oral two times a day  doxazosin 4 milliGRAM(s) Oral at bedtime  heparin  Injectable 5000 Unit(s) SubCutaneous every 8 hours  insulin lispro (HumaLOG) corrective regimen sliding scale   SubCutaneous Before meals and at bedtime  ketorolac 0.5% Ophthalmic Solution 1 Drop(s) Right EYE three times a day  metoprolol     tartrate 25 milliGRAM(s) Oral every 6 hours  ofloxacin 0.3% Solution 1 Drop(s) Right EYE three times a day  pantoprazole    Tablet 40 milliGRAM(s) Oral before breakfast  polyethylene glycol 3350 17 Gram(s) Oral daily  prednisoLONE  forte 1% Suspension 1 Drop(s) Right EYE three times a day  senna 2 Tablet(s) Oral at bedtime  sodium chloride 0.45%. 1000 milliLiter(s) (10 mL/Hr) IV Continuous <Continuous>  sodium chloride 0.9% lock flush 3 milliLiter(s) IV Push every 8 hours  sodium chloride 0.9%. 1000 milliLiter(s) (50 mL/Hr) IV Continuous <Continuous>  sodium chloride 0.9%. 1000 milliLiter(s) (65 mL/Hr) IV Continuous <Continuous>    MEDICATIONS  (PRN):  acetaminophen   Tablet. 650 milliGRAM(s) Oral every 6 hours PRN Mild Pain (1 - 3)  dextrose Gel 1 Dose(s) Oral once PRN Blood Glucose LESS THAN 70 milliGRAM(s)/deciliter  glucagon  Injectable 1 milliGRAM(s) IntraMuscular once PRN Glucose LESS THAN 70 milligrams/deciliter      Weight:  1/27: 87.2 kg  1/31: 87.2 kg  Daily     Daily     Weight Change:  no updates on wt to assess status    Estimated energy needs: IBW: 70kg, 125% of IBW; needs per age and post-op status    Kcal: 25-30kcal/IBW: 1745-2094Kcal  protein: 1.2-1.4g/IBW: 84-98g  Fluids: 30-35ml/IBW: 2094-2443ml      Subjective: pt dislikes the food in house, reports <25% po intake w/ breakfast meal; and sometimes 50-75% depending on meals; likes fruits does not like sauces and spices; pt encouraged to make choices from menu to have better po intake; willing to try Ensure Enlive d/w PA; order verification will be sent; denies N/V/D/C; Last BM was yesterday; DASH/TLC diet briefly reviewed as pt was not receptive at this time.     Previous Nutrition Diagnosis: Unintended weight loss r/t suspected decreased in PO intake PTA AEB pt reports 50 lb wt loss over unclear time frame      Active [   X]  Resolved [   ]    If resolved, new PES:     Goal: Pt to meet 75% of needs PO    Recommendations:   -provide Ensure Enlive BID- D/W pa- please verify recommended oprder   -encourage po intake w/ meals and snacks; provide pt's food preferences.   -please update current wt and check wt x2/week  -provide nutrition edu. as feasible  Education: DASH/TLC    Risk Level: High [  x ] Moderate [   ] Low [   ]

## 2018-02-06 NOTE — PROGRESS NOTE ADULT - SUBJECTIVE AND OBJECTIVE BOX
Patient discussed on morning rounds with Dr. Galvez    Operation / Date:  1/31/18 MIDCAB     SUBJECTIVE ASSESSMENT:  85y Male seen and examined. Today patient has no acute complaints, just feels tired from not sleeping well. Denies fever, chills, chest pain, palpitations, SOB, abdominal pain, n/v/d.c Ambulating with PT/walker, using IS, pulling about 1000cc, tolerating pO diet, having normal BMs.         Vital Signs Last 24 Hrs  T(C): 36.6 (06 Feb 2018 01:00), Max: 37 (05 Feb 2018 14:20)  T(F): 97.9 (06 Feb 2018 01:00), Max: 98.6 (05 Feb 2018 14:20)  HR: 66 (06 Feb 2018 05:30) (62 - 68)  BP: 178/79 (06 Feb 2018 05:30) (152/67 - 178/79)  BP(mean): 113 (06 Feb 2018 05:30) (97 - 115)  RR: 20 (06 Feb 2018 05:30) (17 - 20)  SpO2: 96% (06 Feb 2018 05:30) (95% - 96%)  I&O's Detail    05 Feb 2018 07:01  -  06 Feb 2018 07:00  --------------------------------------------------------  IN:    Oral Fluid: 1300 mL  Total IN: 1300 mL    OUT:    Voided: 830 mL  Total OUT: 830 mL    Total NET: 470 mL          CHEST TUBE:  No  CHAD DRAIN:  No.  EPICARDIAL WIRES: No.  TIE DOWNS: Yes  LANE: No.    PHYSICAL EXAM:    General: Sitting comfortably in bed, no acute distress     Neurological: awake alert oriented, no neuro deficits      Cardiovascular: S1S2, RRR, no murmurs appreciated    Respiratory: decreased b/l bases, no wheeze/rales    Gastrointestinal: +BS, soft nontender nondistended     Extremities: warm and well perfused, no edema, no calf tenderness    Vascular: 2+ radial b/l, 1+ DP b/l    Incision Sites: Thoracotomy: CDI, no signs of infection/dehiscence.  CT site: +[place].    LABS:                        9.7    9.8   )-----------( 219      ( 06 Feb 2018 05:59 )             30.1       COUMADIN:  NO    PT/INR - ( 05 Feb 2018 04:51 )   PT: 13.1 sec;   INR: 1.18          PTT - ( 05 Feb 2018 04:51 )  PTT:31.2 sec    02-06    142  |  105  |  46<H>  ----------------------------<  125<H>  4.1   |  25  |  2.06<H>    Ca    9.2      06 Feb 2018 05:59  Phos  3.2     02-05  Mg     2.5     02-06    TPro  6.1  /  Alb  2.7<L>  /  TBili  0.6  /  DBili  x   /  AST  38  /  ALT  19  /  AlkPhos  168<H>  02-05          MEDICATIONS  (STANDING):  amiodarone    Tablet 400 milliGRAM(s) Oral every 8 hours  amLODIPine   Tablet 5 milliGRAM(s) Oral daily  aspirin enteric coated 81 milliGRAM(s) Oral daily  atorvastatin 80 milliGRAM(s) Oral at bedtime  cefTRIAXone Injectable. 2000 milliGRAM(s) IV Push every 24 hours  dextrose 5%. 1000 milliLiter(s) (50 mL/Hr) IV Continuous <Continuous>  dextrose 50% Injectable 12.5 Gram(s) IV Push once  dextrose 50% Injectable 25 Gram(s) IV Push once  dextrose 50% Injectable 25 Gram(s) IV Push once  docusate sodium 100 milliGRAM(s) Oral two times a day  doxazosin 4 milliGRAM(s) Oral at bedtime  furosemide    Tablet 40 milliGRAM(s) Oral daily  heparin  Injectable 5000 Unit(s) SubCutaneous every 8 hours  insulin lispro (HumaLOG) corrective regimen sliding scale   SubCutaneous Before meals and at bedtime  ketorolac 0.5% Ophthalmic Solution 1 Drop(s) Right EYE three times a day  metoprolol     tartrate 25 milliGRAM(s) Oral every 6 hours  ofloxacin 0.3% Solution 1 Drop(s) Right EYE three times a day  pantoprazole    Tablet 40 milliGRAM(s) Oral before breakfast  polyethylene glycol 3350 17 Gram(s) Oral daily  prednisoLONE  forte 1% Suspension 1 Drop(s) Right EYE three times a day  senna 2 Tablet(s) Oral at bedtime  sodium chloride 0.45%. 1000 milliLiter(s) (10 mL/Hr) IV Continuous <Continuous>  sodium chloride 0.9% lock flush 3 milliLiter(s) IV Push every 8 hours  sodium chloride 0.9%. 1000 milliLiter(s) (65 mL/Hr) IV Continuous <Continuous>    MEDICATIONS  (PRN):  acetaminophen   Tablet. 650 milliGRAM(s) Oral every 6 hours PRN Mild Pain (1 - 3)  dextrose Gel 1 Dose(s) Oral once PRN Blood Glucose LESS THAN 70 milliGRAM(s)/deciliter  glucagon  Injectable 1 milliGRAM(s) IntraMuscular once PRN Glucose LESS THAN 70 milligrams/deciliter        RADIOLOGY & ADDITIONAL TESTS:  CXR PA/LAT pending official read 2/6/18: L pleural effusion vs atelectasis

## 2018-02-06 NOTE — CHART NOTE - NSCHARTNOTEFT_GEN_A_CORE
Per Dr. Galvez, pigtail placed to left pleural effusion to be removed in an hour, drained 600cc serosanguinous, kept in for 3 hours, patient ambulated with minimal drainage about 20cc, no airleak appreciated.  Pigtail removed at bedside, and occlusive dressing placed, without complications. Patient tolerated procedure well.  CXR PA/Lat ordered, will f/u.

## 2018-02-06 NOTE — PROCEDURE NOTE - NSPROCDETAILS_GEN_ALL_CORE
ultrasound assessment of effusion (localization)/location identified, draped/prepped, sterile technique used, needle inserted/introduced/catheter inserted over needle/ultrasound assessment of effusion (size)
location identified, draped/prepped, sterile technique used, needle inserted/introduced/catheter inserted over needle/connection to syringe/ultrasound assessment of effusion (localization)
sterile dressing applied/dressing applied/secured in place/percutaneous/ultrasound assessment of fluid (location)/supine position/ultrasound assessment of fluid (size)/Seldinger technique

## 2018-02-06 NOTE — PROGRESS NOTE ADULT - ASSESSMENT
85 year old M, a poor historian with HTN HLD, BPH, s/p AAA repair (2008 at [place]),  BIBA to [facility] after feeling lightheaded and fell at home on Friday night. Lab work revealed troponin 23, no acute changes on EKG seen, NSTEMI, patient was subsequently loaded with aspirin and plavix. Cardiac Cath done showed pLAD 80% stenosis, mCirc diffuse 80% stenosis, OM1 99% in the third proximal segment, pRCA 100% stenosis, EF 50% apical hypokinesis, apical septal hypokinesis.  Blood culture sent was positive for gram negative rods/E. Coli. Patient was given a dose of Zosyn 3.375mg today before his transfer to [facility] ICU on heparin infusion under Dr. Gilman for evaluation for CABG. On 1/31/18 patient underwent robotic MIDCAB with Dr. Galvez (after CT chest revealed severe calcification of aorta and degree of vessel ds), OR course uncomplicated.  Post operative course complicated by post op afib, requiring PO amio load, has been in NSR since.  Today POD7 patient doing well. Awaiting placement to [facility].     Neurovascular: No delirium. Pain well controlled with current regimen.  -cont tylenol PRN Pain    Cardiovascular: Hemodynamically stable. HR controlled.  -Hx HTn, HLD, CAD NSTEMI s/p MIDCAB, EF 60%, post op AFib- now NSR  -continue metoprolol 25mg q6, amiodarone load then 200mg daily, aspirin 80mg daily amlodopine 5mg daily  -monitor HR/BPtele    Respiratory: 02 Sat = 96% on RA.  -Encourage ambulation, C+DB and Use of IS 10x / hr while awake.  -PA/LAT- L pleural effusion vs atelectasis  -Dr. Galvez aware of effusion- Lasix 40mg PO x 3 days started f/u CXR  -monitor SpO2    GI: Stable.  -cont protonix for GI PPX.  -cont bowel regimen  -PO Diet.    Renal / : acute on chronic CKD- Cr this AM 2.06 (baseline 1.7-2)  -Monitor renal function.  -Monitor I/O's.  -continue diuresis  -encourage PO hydration  -Hx BPH- cont doxazosin QHS    Endocrine:    -A1c 5.0, monitor FS, well controleld    Hematologic: H&H stable  -f/u AM CBC.    ID: afebrile, WBC 9.8, Blood cx +E.coli from OSH   -cont ceftriaxone IV q24 while inpatient, will be d/c on keflex  -Observe for SIRS/Sepsis Syndrome.    Prophylaxis:  -DVT prophylaxis with 5000 SubQ Heparin q8h.  -SCD's    Disposition:  -CARLITOS when approved 85 year old M, a poor historian with HTN HLD, BPH, s/p AAA repair (2008 at [place]),  BIBA to [facility] after feeling lightheaded and fell at home on Friday night. Lab work revealed troponin 23, no acute changes on EKG seen, NSTEMI, patient was subsequently loaded with aspirin and plavix. Cardiac Cath done showed pLAD 80% stenosis, mCirc diffuse 80% stenosis, OM1 99% in the third proximal segment, pRCA 100% stenosis, EF 50% apical hypokinesis, apical septal hypokinesis.  Blood culture sent was positive for gram negative rods/E. Coli. Patient was given a dose of Zosyn 3.375mg today before his transfer to [facility] ICU on heparin infusion under Dr. Gilman for evaluation for CABG. On 1/31/18 patient underwent robotic MIDCAB with Dr. Galvez (after CT chest revealed severe calcification of aorta and degree of vessel ds), OR course uncomplicated.  Post operative course complicated by post op afib, requiring PO amio load, has been in NSR since.  Today POD7 patient doing well. Awaiting placement to [facility].     Neurovascular: No delirium. Pain well controlled with current regimen.  -cont tylenol PRN Pain    Cardiovascular: Hemodynamically stable. HR controlled.  -Hx HTn, HLD, CAD NSTEMI s/p MIDCAB, EF 60%, post op AFib- now NSR  -continue metoprolol 25mg q6, amiodarone load then 200mg daily, aspirin 80mg daily amlodopine 5mg daily  -monitor HR/BPtele    Respiratory: 02 Sat = 96% on RA.  -Encourage ambulation, C+DB and Use of IS 10x / hr while awake.  -PA/LAT- L pleural effusion vs atelectasis, will U/S at bedside  -monitor SpO2    GI: Stable.  -cont protonix for GI PPX.  -cont bowel regimen  -PO Diet.    Renal / : acute on chronic CKD- Cr this AM 2.06 (baseline 1.7-2)  -Monitor renal function.  -Monitor I/O's.  -encourage PO hydration  -Hx BPH- cont doxazosin QHS    Endocrine:    -A1c 5.0, monitor FS, well controleld    Hematologic: H&H stable  -f/u AM CBC.    ID: afebrile, WBC 9.8, Blood cx +E.coli from OSH   -cont ceftriaxone IV q24 while inpatient, will be d/c on keflex  -Observe for SIRS/Sepsis Syndrome.    Prophylaxis:  -DVT prophylaxis with 5000 SubQ Heparin q8h.  -SCD's    Disposition:  -CARLITOS when approved 85 year old M, a poor historian with HTN HLD, BPH, s/p AAA repair (2008 at [place]),  BIBA to [facility] after feeling lightheaded and fell at home on Friday night. Lab work revealed troponin 23, no acute changes on EKG seen, NSTEMI, patient was subsequently loaded with aspirin and plavix. Cardiac Cath done showed pLAD 80% stenosis, mCirc diffuse 80% stenosis, OM1 99% in the third proximal segment, pRCA 100% stenosis, EF 50% apical hypokinesis, apical septal hypokinesis.  Blood culture sent was positive for gram negative rods/E. Coli. Patient was given a dose of Zosyn 3.375mg today before his transfer to [facility] ICU on heparin infusion under Dr. Gilman for evaluation for CABG. On 1/31/18 patient underwent robotic MIDCAB with Dr. Galvez (after CT chest revealed severe calcification of aorta and degree of vessel ds), OR course uncomplicated.  Post operative course complicated by post op afib, requiring PO amio load, has been in NSR since.  Today POD7 patient doing well. Awaiting placement to [facility].     Neurovascular: No delirium. Pain well controlled with current regimen.  -cont tylenol PRN Pain    Cardiovascular: Hemodynamically stable. HR controlled.  -Hx HTn, HLD, CAD NSTEMI s/p MIDCAB, EF 60%, post op AFib- now NSR  -continue metoprolol 25mg q6, amiodarone load then 200mg daily, aspirin 80mg daily amlodopine 5mg daily  -No plavix per Dr. Galvez for guaiac +stool   -monitor HR/BPtele    Respiratory: 02 Sat = 96% on RA.  -Encourage ambulation, C+DB and Use of IS 10x / hr while awake.  -PA/LAT- L pleural effusion vs atelectasis, will U/S at bedside  -monitor SpO2    GI: Stable.  -cont protonix for GI PPX.  -cont bowel regimen  -PO Diet.    Renal / : acute on chronic CKD- Cr this AM 2.06 (baseline 1.7-2)  -Monitor renal function.  -Monitor I/O's.  -encourage PO hydration  -Hx BPH- cont doxazosin QHS    Endocrine:    -A1c 5.0, monitor FS, well controleld    Hematologic: H&H stable  -f/u AM CBC.    ID: afebrile, WBC 9.8, Blood cx +E.coli from OSH   -cont ceftriaxone IV q24 while inpatient, will be d/c on keflex  -Observe for SIRS/Sepsis Syndrome.    Prophylaxis:  -DVT prophylaxis with 5000 SubQ Heparin q8h.  -SCD's    Disposition:  -CARLITOS when approved

## 2018-02-07 ENCOUNTER — TRANSCRIPTION ENCOUNTER (OUTPATIENT)
Age: 83
End: 2018-02-07

## 2018-02-07 LAB
ANION GAP SERPL CALC-SCNC: 9 MMOL/L — SIGNIFICANT CHANGE UP (ref 5–17)
BUN SERPL-MCNC: 45 MG/DL — HIGH (ref 7–23)
CALCIUM SERPL-MCNC: 9 MG/DL — SIGNIFICANT CHANGE UP (ref 8.4–10.5)
CHLORIDE SERPL-SCNC: 105 MMOL/L — SIGNIFICANT CHANGE UP (ref 96–108)
CO2 SERPL-SCNC: 26 MMOL/L — SIGNIFICANT CHANGE UP (ref 22–31)
CREAT SERPL-MCNC: 1.95 MG/DL — HIGH (ref 0.5–1.3)
GLUCOSE BLDC GLUCOMTR-MCNC: 103 MG/DL — HIGH (ref 70–99)
GLUCOSE BLDC GLUCOMTR-MCNC: 128 MG/DL — HIGH (ref 70–99)
GLUCOSE BLDC GLUCOMTR-MCNC: 96 MG/DL — SIGNIFICANT CHANGE UP (ref 70–99)
GLUCOSE BLDC GLUCOMTR-MCNC: 98 MG/DL — SIGNIFICANT CHANGE UP (ref 70–99)
GLUCOSE SERPL-MCNC: 118 MG/DL — HIGH (ref 70–99)
HCT VFR BLD CALC: 29.4 % — LOW (ref 39–50)
HGB BLD-MCNC: 9.5 G/DL — LOW (ref 13–17)
MAGNESIUM SERPL-MCNC: 2.3 MG/DL — SIGNIFICANT CHANGE UP (ref 1.6–2.6)
MCHC RBC-ENTMCNC: 28.8 PG — SIGNIFICANT CHANGE UP (ref 27–34)
MCHC RBC-ENTMCNC: 32.3 G/DL — SIGNIFICANT CHANGE UP (ref 32–36)
MCV RBC AUTO: 89.1 FL — SIGNIFICANT CHANGE UP (ref 80–100)
PLATELET # BLD AUTO: 217 K/UL — SIGNIFICANT CHANGE UP (ref 150–400)
POTASSIUM SERPL-MCNC: 4.2 MMOL/L — SIGNIFICANT CHANGE UP (ref 3.5–5.3)
POTASSIUM SERPL-SCNC: 4.2 MMOL/L — SIGNIFICANT CHANGE UP (ref 3.5–5.3)
RBC # BLD: 3.3 M/UL — LOW (ref 4.2–5.8)
RBC # FLD: 15.1 % — SIGNIFICANT CHANGE UP (ref 10.3–16.9)
SODIUM SERPL-SCNC: 140 MMOL/L — SIGNIFICANT CHANGE UP (ref 135–145)
WBC # BLD: 10.3 K/UL — SIGNIFICANT CHANGE UP (ref 3.8–10.5)
WBC # FLD AUTO: 10.3 K/UL — SIGNIFICANT CHANGE UP (ref 3.8–10.5)

## 2018-02-07 RX ORDER — HYDRALAZINE HCL 50 MG
10 TABLET ORAL ONCE
Qty: 0 | Refills: 0 | Status: COMPLETED | OUTPATIENT
Start: 2018-02-07 | End: 2018-02-07

## 2018-02-07 RX ORDER — AMLODIPINE BESYLATE 2.5 MG/1
10 TABLET ORAL DAILY
Qty: 0 | Refills: 0 | Status: DISCONTINUED | OUTPATIENT
Start: 2018-02-08 | End: 2018-02-08

## 2018-02-07 RX ORDER — AMLODIPINE BESYLATE 2.5 MG/1
5 TABLET ORAL ONCE
Qty: 0 | Refills: 0 | Status: COMPLETED | OUTPATIENT
Start: 2018-02-07 | End: 2018-02-07

## 2018-02-07 RX ORDER — HYDRALAZINE HCL 50 MG
5 TABLET ORAL ONCE
Qty: 0 | Refills: 0 | Status: COMPLETED | OUTPATIENT
Start: 2018-02-07 | End: 2018-02-07

## 2018-02-07 RX ADMIN — Medication 81 MILLIGRAM(S): at 09:12

## 2018-02-07 RX ADMIN — LIDOCAINE 1 PATCH: 4 CREAM TOPICAL at 00:05

## 2018-02-07 RX ADMIN — Medication 10 MILLIGRAM(S): at 21:44

## 2018-02-07 RX ADMIN — ATORVASTATIN CALCIUM 80 MILLIGRAM(S): 80 TABLET, FILM COATED ORAL at 21:45

## 2018-02-07 RX ADMIN — Medication 4 MILLIGRAM(S): at 21:45

## 2018-02-07 RX ADMIN — Medication 1 DROP(S): at 21:46

## 2018-02-07 RX ADMIN — Medication 1 DROP(S): at 06:04

## 2018-02-07 RX ADMIN — AMLODIPINE BESYLATE 5 MILLIGRAM(S): 2.5 TABLET ORAL at 09:12

## 2018-02-07 RX ADMIN — Medication 1 DROP(S): at 14:46

## 2018-02-07 RX ADMIN — HEPARIN SODIUM 5000 UNIT(S): 5000 INJECTION INTRAVENOUS; SUBCUTANEOUS at 13:27

## 2018-02-07 RX ADMIN — Medication 25 MILLIGRAM(S): at 12:14

## 2018-02-07 RX ADMIN — SODIUM CHLORIDE 3 MILLILITER(S): 9 INJECTION INTRAMUSCULAR; INTRAVENOUS; SUBCUTANEOUS at 13:22

## 2018-02-07 RX ADMIN — Medication 1 DROP(S): at 21:47

## 2018-02-07 RX ADMIN — Medication 1 DROP(S): at 05:34

## 2018-02-07 RX ADMIN — Medication 25 MILLIGRAM(S): at 05:31

## 2018-02-07 RX ADMIN — HEPARIN SODIUM 5000 UNIT(S): 5000 INJECTION INTRAVENOUS; SUBCUTANEOUS at 05:33

## 2018-02-07 RX ADMIN — PANTOPRAZOLE SODIUM 40 MILLIGRAM(S): 20 TABLET, DELAYED RELEASE ORAL at 06:05

## 2018-02-07 RX ADMIN — HEPARIN SODIUM 5000 UNIT(S): 5000 INJECTION INTRAVENOUS; SUBCUTANEOUS at 21:46

## 2018-02-07 RX ADMIN — Medication 1 DROP(S): at 14:45

## 2018-02-07 RX ADMIN — Medication 25 MILLIGRAM(S): at 17:12

## 2018-02-07 RX ADMIN — Medication 25 MILLIGRAM(S): at 00:01

## 2018-02-07 RX ADMIN — Medication 100 MILLIGRAM(S): at 05:33

## 2018-02-07 RX ADMIN — AMIODARONE HYDROCHLORIDE 400 MILLIGRAM(S): 400 TABLET ORAL at 05:33

## 2018-02-07 RX ADMIN — Medication 5 MILLIGRAM(S): at 19:37

## 2018-02-07 RX ADMIN — AMLODIPINE BESYLATE 5 MILLIGRAM(S): 2.5 TABLET ORAL at 05:32

## 2018-02-07 RX ADMIN — CEFTRIAXONE 2000 MILLIGRAM(S): 500 INJECTION, POWDER, FOR SOLUTION INTRAMUSCULAR; INTRAVENOUS at 21:45

## 2018-02-07 RX ADMIN — SODIUM CHLORIDE 3 MILLILITER(S): 9 INJECTION INTRAMUSCULAR; INTRAVENOUS; SUBCUTANEOUS at 06:05

## 2018-02-07 NOTE — DISCHARGE NOTE ADULT - PLAN OF CARE
Recover from Surgery -Please follow up with Dr. Bello Galvez on 2/13/18 at 1:30pm.  The office is located at [facility], 4th floor. Call us with any questions  #[phone].    -Walk daily as tolerated and use your incentive spirometer every hour.    -No driving or strenuous activity/exercise for 6 weeks, or until  cleared by your surgeon.    -Gently clean your incisions with anti-bacterial soap and water, pat  dry.  You may leave them open to air.    -Call your doctor if you have shortness of breath, chest pain not  relieved by pain medication, dizziness, fever >101.5, or increased  redness or drainage from incisions. n Additional follow-up appointments -Dr. Easton Schrader (Cardiologist who referred you to Dr. Galvez) would like you to follow-up with him on 2/21/18 at 11:30am.  His office is located at [facility] [place], [place].  Phone: [phone].    -Please follow up with your primary care provider to discuss your hospital stay within 2-4 weeks after discharge. Antibiotics -You were noted to have a bacteria in your blood called E.coli prior to your surgery.  This has been treated with antibiotics given through your IV and you will continue taking an antibiotic call Keflex (Cephalexin) 500mg by mouth every 6 hours for a total of 3 weeks.  You will be seen in Dr. Galvez's office while you are taking this medication and any changes that need to be made to this medication will be addressed then.

## 2018-02-07 NOTE — DISCHARGE NOTE ADULT - CARE PROVIDERS DIRECT ADDRESSES
[email or web address]

## 2018-02-07 NOTE — DISCHARGE NOTE ADULT - CARE PLAN
Principal Discharge DX:	CAD (coronary artery disease)  Goal:	Recover from Surgery  Assessment and plan of treatment:	-Please follow up with Dr. Bello Galvez on 2/13/18 at 1:30pm.  The office is located at [facility], 4th floor. Call us with any questions  #[phone].    -Walk daily as tolerated and use your incentive spirometer every hour.    -No driving or strenuous activity/exercise for 6 weeks, or until  cleared by your surgeon.    -Gently clean your incisions with anti-bacterial soap and water, pat  dry.  You may leave them open to air.    -Call your doctor if you have shortness of breath, chest pain not  relieved by pain medication, dizziness, fever >101.5, or increased  redness or drainage from incisions. n  Goal:	Additional follow-up appointments  Assessment and plan of treatment:	-Dr. Easton Schrader (Cardiologist who referred you to Dr. Galvez) would like you to follow-up with him on 2/21/18 at 11:30am.  His office is located at [address] [place], [place].  Phone: [phone].    -Please follow up with your primary care provider to discuss your hospital stay within 2-4 weeks after discharge. Principal Discharge DX:	CAD (coronary artery disease)  Goal:	Recover from Surgery  Assessment and plan of treatment:	-Please follow up with Dr. Bello Galvez on 2/13/18 at 1:30pm.  The office is located at [facility], 4th floor. Call us with any questions  #[phone].    -Walk daily as tolerated and use your incentive spirometer every hour.    -No driving or strenuous activity/exercise for 6 weeks, or until  cleared by your surgeon.    -Gently clean your incisions with anti-bacterial soap and water, pat  dry.  You may leave them open to air.    -Call your doctor if you have shortness of breath, chest pain not  relieved by pain medication, dizziness, fever >101.5, or increased  redness or drainage from incisions. n  Goal:	Additional follow-up appointments  Assessment and plan of treatment:	-Dr. Easton Schrader (Cardiologist who referred you to Dr. Galvez) would like you to follow-up with him on 2/21/18 at 11:30am.  His office is located at [address] [place] [address].  Phone: [phone].    -Please follow up with your primary care provider to discuss your hospital stay within 2-4 weeks after discharge.  Goal:	Antibiotics  Assessment and plan of treatment:	-You were noted to have a bacteria in your blood called E.coli prior to your surgery.  This has been treated with antibiotics given through your IV and you will continue taking an antibiotic call Keflex (Cephalexin) 500mg by mouth every 6 hours for a total of 3 weeks.  You will be seen in Dr. Galvez's office while you are taking this medication and any changes that need to be made to this medication will be addressed then.

## 2018-02-07 NOTE — DISCHARGE NOTE ADULT - HOSPITAL COURSE
This is an 85 year old male, poor historian, with history of HTN, HLD, BPH, AAA s/p repair in 2008 at [place] who presented emergently to [facility] with lightheadedness and syncopal episode at home with subsequent workup revealing NSTEMI.  He underwent cardaic catheterization on   85 year old M, a poor historian with HTN HLD, BPH, s/p AAA repair (2008 at [place]),  BIBA to [facility] after feeling lightheaded and fell at home on Friday night. Lab work revealed troponin 23, no acute changes on EKG seen, NSTEMI, patient was subsequently loaded with aspirin and plavix. Cardiac Cath done showed pLAD 80% stenosis, mCirc diffuse 80% stenosis, OM1 99% in the third proximal segment, pRCA 100% stenosis, EF 50% apical hypokinesis, apical septal hypokinesis.  Blood culture sent was positive for gram negative rods/E. Coli. Patient was given a dose of Zosyn 3.375mg today before his transfer to [facility] ICU on heparin infusion under Dr. Gilman for evaluation for CABG. On 1/31/18 patient underwent robotic MIDCAB with Dr. Galvez (after CT chest revealed severe calcification of aorta and degree of vessel ds), OR course uncomplicated.  Post operative course complicated by post op afib, requiring PO amio load, has been in NSR since.  Today POD7 patient doing well. Awaiting placement to [facility]. This is an 85 year old male, poor historian, with history of HTN, HLD, BPH, AAA s/p repair in 2008 at [place] who presented emergently to [facility] with lightheadedness and syncopal episode at home with subsequent workup revealing NSTEMI.  He underwent cardaic catheterization during that admission which demonstrated pLAD 80% stenosis, mCirc diffuse 80% stenosis, OM1 99% in the third proximal segment, pRCA 100% stenosis, EF 50% apical hypokinesis, apical septal hypokinesis. Blood cultures were obtained as well during that admission which resulted for E.coli for which empiric abx were initiated.  After referral to Dr. Dee for surgical evaluation, he was transferred ot [facility] under his care on 1/27/18 to complete perioperative evaluation.  CT Chest revealed severe aortic calcification with associated high risk for sternotomy CABG and he was deemed candidate for MIDCAB, which he underwent on 1/31/18 with Dr. Bello Galvez.  After an uncomplicated intraoperative course, he arrived to CTICU in stable condition, extubated overnight.  He remained stable POD 1-2, BB started on POD 2.  POD 3, pt noted to have sundowning with delirium overnight.  Received 2x Amio IVPB for afib and mentation improved throughout the day.  POD 4, stable.  POD 5, Cr 2.1.  Pt noted to have pleural effusion for which he underwent uncomplicated thoracentesis, draining 500cc serosanguinous fluid. Rodriguez out, +TOV.  Transferred to floor care.  POD 6, AM CXR demonstrated left pleural effusion, confirmed with bedside ultrasound.  Left pleural pigtail catheter was placed, draining 550cc serosanguinous fluid and removed later that day with stable f/u CXR.  Creatinine trending down.  On 2/7/18, pt is POD 7 and progressing very well, tolerating room air at rest and with ambulation, and has had a post-op BM.  After evaluation in AM by Dr. Galvez, he was medically cleared for discharge to sub-acute rehab facility on PO Keflex with plan for outpatient follow-up appointments arranged.  Prior to discharge, medications and instructions were discussed thoroughly with the patient. This is an 85 year old male, poor historian, with history of HTN, HLD, BPH, AAA s/p repair in 2008 at [place] who presented emergently to [facility] with lightheadedness and syncopal episode at home with subsequent workup revealing NSTEMI.  He underwent cardaic catheterization during that admission which demonstrated pLAD 80% stenosis, mCirc diffuse 80% stenosis, OM1 99% in the third proximal segment, pRCA 100% stenosis, EF 50% apical hypokinesis, apical septal hypokinesis. Blood cultures were obtained as well during that admission which resulted for E.coli for which empiric abx were initiated.  After referral to Dr. Dee for surgical evaluation, he was transferred ot [facility] under his care on 1/27/18 to complete perioperative evaluation.  CT Chest revealed severe aortic calcification with associated high risk for sternotomy CABG and he was deemed candidate for MIDCAB, which he underwent on 1/31/18 with Dr. Bello Galvez.  After an uncomplicated intraoperative course, he arrived to CTICU in stable condition, extubated overnight.  He remained stable POD 1-2, BB started on POD 2.  POD 3, pt noted to have sundowning with delirium overnight.  Received 2x Amio IVPB for afib and mentation improved throughout the day.  POD 4, stable.  POD 5, Cr 2.1.  Pt noted to have pleural effusion for which he underwent uncomplicated thoracentesis, draining 500cc serosanguinous fluid. Rodriguez out, +TOV.  Transferred to floor care.  POD 6, AM CXR demonstrated left pleural effusion, confirmed with bedside ultrasound.  Left pleural pigtail catheter was placed, draining 550cc serosanguinous fluid and removed later that day with stable f/u CXR.  Creatinine trending down.  On 2/7/18, pt is POD 7 and progressing very well, tolerating room air at rest and with ambulation, and has had a post-op BM.  After evaluation in AM by Dr. Galvez, he was medically cleared for discharge to sub-acute rehab facility on PO Keflex with plan for outpatient follow-up appointments arranged. He remained inpatient overnight due to pending insurance authorization for accepted rehab bed. Prior to discharge, medications and instructions were discussed thoroughly with the patient. This is an 85 year old male, poor historian, with history of HTN, HLD, BPH, AAA s/p repair in 2008 at [place] who presented emergently to [facility] with lightheadedness and syncopal episode at home with subsequent workup revealing NSTEMI.  He underwent cardaic catheterization during that admission which demonstrated pLAD 80% stenosis, mCirc diffuse 80% stenosis, OM1 99% in the third proximal segment, pRCA 100% stenosis, EF 50% apical hypokinesis, apical septal hypokinesis. Blood cultures were obtained as well during that admission which resulted for E.coli for which empiric abx were initiated.  After referral to Dr. Dee for surgical evaluation, he was transferred ot [facility] under his care on 1/27/18 to complete perioperative evaluation.  CT Chest revealed severe aortic calcification with associated high risk for sternotomy CABG and he was deemed candidate for MIDCAB, which he underwent on 1/31/18 with Dr. Bello Galvez.  After an uncomplicated intraoperative course, he arrived to CTICU in stable condition, extubated overnight.  He remained stable POD 1-2, BB started on POD 2.  POD 3, pt noted to have sundowning with delirium overnight.  Received 2x Amio IVPB for afib and mentation improved throughout the day.  POD 4, stable.  POD 5, Cr 2.1.  Pt noted to have pleural effusion for which he underwent uncomplicated thoracentesis, draining 500cc serosanguinous fluid. Rodriguez out, +TOV.  Transferred to floor care.  POD 6, AM CXR demonstrated left pleural effusion, confirmed with bedside ultrasound.  Left pleural pigtail catheter was placed, draining 550cc serosanguinous fluid and removed later that day with stable f/u CXR.  Creatinine trending down.  On 2/7/18, pt is POD 7 and progressing very well, tolerating room air at rest and with ambulation, and has had a post-op BM.  After evaluation in AM by Dr. Galvez, he was medically cleared for discharge to sub-acute rehab facility on PO Keflex to complete a 4 week course of antibiotic therapy with plan for outpatient follow-up appointments arranged. He remained inpatient overnight due to pending insurance authorization for accepted rehab bed. Prior to discharge, medications and instructions were discussed thoroughly with the patient.

## 2018-02-07 NOTE — PROGRESS NOTE ADULT - ASSESSMENT
85 year old M, a poor historian with HTN HLD, BPH, s/p AAA repair (2008 at [place]),  BIBA to [facility] after feeling lightheaded and fell at home on Friday night. Lab work revealed troponin 23, no acute changes on EKG seen, NSTEMI, patient was subsequently loaded with aspirin and plavix. Cardiac Cath done showed pLAD 80% stenosis, mCirc diffuse 80% stenosis, OM1 99% in the third proximal segment, pRCA 100% stenosis, EF 50% apical hypokinesis, apical septal hypokinesis.  Blood culture sent was positive for gram negative rods/E. Coli. Patient was given a dose of Zosyn 3.375mg today before his transfer to [facility] ICU on heparin infusion under Dr. Gilman for evaluation for CABG. On 1/31/18 patient underwent robotic MIDCAB with Dr. Galvez (after CT chest revealed severe calcification of aorta and degree of vessel ds), OR course uncomplicated.  Post operative course complicated by post op afib, requiring PO amio load, has been in NSR since.  He has remained HD stable and is progressing very well since transfer to floor care, now POD 8.     Neurovascular: No delirium. Pain well controlled with current regimen.  -cont tylenol PRN Pain    Cardiovascular: Hemodynamically stable. HR controlled.  -Hx HTn, HLD, CAD NSTEMI s/p MIDCAB, EF 60%, post op AFib- now NSR  -continue metoprolol 25mg q6, amiodarone load then 200mg daily, aspirin 80mg daily; increased Norvasc to 10mg QD for BP control  -No plavix per Dr. Galvez for guaiac +stool   -monitor HR/BPtele    Respiratory: Saturating well on RA.  -Encourage ambulation, C+DB and Use of IS 10x / hr while awake.  -monitor SpO2    GI: Stable.  -cont protonix for GI PPX.  -cont bowel regimen  -PO Diet.    Renal / : acute on chronic CKD- Cr this AM 1.9, trending down   -Monitor renal function.  -Monitor I/O's.  -encourage PO hydration  -Hx BPH- cont doxazosin QHS    Endocrine:    -A1c 5.0, monitor FS, well controleld    Hematologic: H&H stable  -f/u AM CBC.    ID: afebrile, Blood cx +E.coli from OSH   -cont ceftriaxone IV q24 while inpatient, will be d/c on keflex  -Observe for SIRS/Sepsis Syndrome.    Prophylaxis:  -DVT prophylaxis with 5000 SubQ Heparin q8h.  -SCD's    Disposition:  -[facility] when insurance authorizes

## 2018-02-07 NOTE — DISCHARGE NOTE ADULT - PATIENT PORTAL LINK FT
You can access the [facility] Patient Portal, offered by [facility], by registering with the following website: [email or web address]

## 2018-02-07 NOTE — PROGRESS NOTE ADULT - SUBJECTIVE AND OBJECTIVE BOX
Patient discussed on morning rounds with Dr. Galvez    Operation / Date: [facility] on 1/31/18    SUBJECTIVE ASSESSMENT:  85y Male seen at bedside this AM.  He denies any acute complaints at this time and denies any acute events overnight.      Vital Signs Last 24 Hrs  T(C): 36.6 (07 Feb 2018 16:21), Max: 36.7 (07 Feb 2018 09:17)  T(F): 97.8 (07 Feb 2018 16:21), Max: 98.1 (07 Feb 2018 09:17)  HR: 66 (07 Feb 2018 20:42) (56 - 70)  BP: 177/84 (07 Feb 2018 20:42) (131/61 - 182/78)  BP(mean): 121 (07 Feb 2018 20:42) (88 - 121)  RR: 16 (07 Feb 2018 17:20) (16 - 18)  SpO2: 96% (07 Feb 2018 17:20) (96% - 98%)  I&O's Detail    06 Feb 2018 07:01  -  07 Feb 2018 07:00  --------------------------------------------------------  IN:    IV PiggyBack: 50 mL    Oral Fluid: 330 mL    sodium chloride 0.9%.: 650 mL  Total IN: 1030 mL    OUT:    Chest Tube: 600 mL    Voided: 200 mL  Total OUT: 800 mL    Total NET: 230 mL      07 Feb 2018 07:01  -  07 Feb 2018 21:03  --------------------------------------------------------  IN:    Oral Fluid: 140 mL  Total IN: 140 mL    OUT:    Voided: 100 mL  Total OUT: 100 mL    Total NET: 40 mL      CHEST TUBE:  No.    CHAD DRAIN:  No.  EPICARDIAL WIRES: No.  TIE DOWNS: Yes.  LANE: No.    PHYSICAL EXAM:  General: OOB to chair, no acute distress  Neurological: AAOx3, no AMS or focal deficits.   Cardiovascular: RRR, S1/S2, no m/r/g.   Respiratory: No acute distress on RA.  CTA b/l, no m/r/g.   Gastrointestinal: ND, NBS, non-TTP.  Extremities: Warm and well perfused, no calf ttp or edema b/l  Vascular: Pulses 2+  Incision Sites: L thoracotomy: C/D/I    LABS:                        9.5    10.3  )-----------( 217      ( 07 Feb 2018 05:45 )             29.4       COUMADIN:  No.    02-07    140  |  105  |  45<H>  ----------------------------<  118<H>  4.2   |  26  |  1.95<H>    Ca    9.0      07 Feb 2018 05:46  Mg     2.3     02-07    MEDICATIONS  (STANDING):  amiodarone    Tablet 200 milliGRAM(s) Oral daily  aspirin enteric coated 81 milliGRAM(s) Oral daily  atorvastatin 80 milliGRAM(s) Oral at bedtime  cefTRIAXone Injectable. 2000 milliGRAM(s) IV Push every 24 hours  dextrose 5%. 1000 milliLiter(s) (50 mL/Hr) IV Continuous <Continuous>  dextrose 50% Injectable 12.5 Gram(s) IV Push once  dextrose 50% Injectable 25 Gram(s) IV Push once  dextrose 50% Injectable 25 Gram(s) IV Push once  docusate sodium 100 milliGRAM(s) Oral two times a day  doxazosin 4 milliGRAM(s) Oral at bedtime  heparin  Injectable 5000 Unit(s) SubCutaneous every 8 hours  insulin lispro (HumaLOG) corrective regimen sliding scale   SubCutaneous Before meals and at bedtime  ketorolac 0.5% Ophthalmic Solution 1 Drop(s) Right EYE three times a day  metoprolol     tartrate 25 milliGRAM(s) Oral every 6 hours  ofloxacin 0.3% Solution 1 Drop(s) Right EYE three times a day  pantoprazole    Tablet 40 milliGRAM(s) Oral before breakfast  polyethylene glycol 3350 17 Gram(s) Oral daily  prednisoLONE  forte 1% Suspension 1 Drop(s) Right EYE three times a day  senna 2 Tablet(s) Oral at bedtime  sodium chloride 0.45%. 1000 milliLiter(s) (10 mL/Hr) IV Continuous <Continuous>  sodium chloride 0.9% lock flush 3 milliLiter(s) IV Push every 8 hours  sodium chloride 0.9%. 1000 milliLiter(s) (50 mL/Hr) IV Continuous <Continuous>  sodium chloride 0.9%. 1000 milliLiter(s) (65 mL/Hr) IV Continuous <Continuous>    MEDICATIONS  (PRN):  acetaminophen   Tablet. 650 milliGRAM(s) Oral every 6 hours PRN Mild Pain (1 - 3)  dextrose Gel 1 Dose(s) Oral once PRN Blood Glucose LESS THAN 70 milliGRAM(s)/deciliter  glucagon  Injectable 1 milliGRAM(s) IntraMuscular once PRN Glucose LESS THAN 70 milligrams/deciliter      RADIOLOGY & ADDITIONAL TESTS:  < from: Xray Chest 2 Views PA/Lat (02.06.18 @ 17:46) >  EXAM:  XR CHEST PA LAT 2V                          PROCEDURE DATE:  02/06/2018          INTERPRETATION:  CHEST X-RAY dated 2/6/2018 5:46 PM    Indication: s/p pigtail    PA and lateral views of the chest are compared to 2/6/2018 at 12:20PM.   The left basal pigtail catheter has been removed. Left lower lobe   infiltrate/atelectasis. Small bilateral pleural effusions. Degenerative   changes and scoliosis of the thoracic spine.    IMPRESSION:  Left lower lobe infiltrate/atelectasis. Small bilateral pleural effusions.    < end of copied text >

## 2018-02-07 NOTE — DISCHARGE NOTE ADULT - CARE PROVIDER_API CALL
Bello Galvez), Cardiovascular Surgery  [address]  [address] [place] [address]  Phone: [phone]  Fax: [phone]    Easton Schrader (MD), Cardiovascular Disease; Interventional Cardiology  [address] [address]  Phone: [phone]  Fax: [phone]

## 2018-02-07 NOTE — DISCHARGE NOTE ADULT - MEDICATION SUMMARY - MEDICATIONS TO TAKE
I will START or STAY ON the medications listed below when I get home from the hospital:    aspirin 81 mg oral delayed release tablet  -- 1 tab(s) by mouth once a day  -- Indication: For Blood thinner    acetaminophen 325 mg oral tablet  -- 2 tab(s) by mouth every 6 hours, As needed, Mild Pain (1 - 3)  -- Indication: For Pain    Cardura 4 mg oral tablet  -- 1 tab(s) by mouth once a day  -- Indication: For Blood pressure    amiodarone 200 mg oral tablet  -- 1 tab(s) by mouth once a day  -- Indication: For Heart rate control    heparin  -- 5000 unit(s) subcutaneously every 8 hours  -- Indication: For Blood thinner     atorvastatin 80 mg oral tablet  -- 1 tab(s) by mouth once a day (at bedtime)  -- Indication: For Cholesterol    metoprolol tartrate 25 mg oral tablet  -- 1 tab(s) by mouth every 6 hours  -- Indication: For Blood pressure    amLODIPine 10 mg oral tablet  -- 1 tab(s) by mouth once a day  -- Indication: For Blood pressure    cephalexin 500 mg oral tablet  -- 1 tab(s) by mouth every 6 hours    End Date: 3/1/18  -- Finish all this medication unless otherwise directed by prescriber.    -- Indication: For Antibiotic, end date 3/1/18    docusate sodium 100 mg oral capsule  -- 1 cap(s) by mouth 2 times a day  -- Indication: For Stool softener    polyethylene glycol 3350 oral powder for reconstitution  -- 17 gram(s) by mouth once a day, As Needed for constipation  -- Indication: For Stool softener/laxative    senna oral tablet  -- 2 tab(s) by mouth once a day (at bedtime)  -- Indication: For Stool softener    ketorolac 0.5% ophthalmic solution  -- 1 drop(s) to each affected eye 3 times a day  -- Indication: For Eye drop    ofloxacin 0.3% ophthalmic solution  -- 1 drop(s) to each affected eye 3 times a day  -- Indication: For Eye drop    prednisoLONE sodium phosphate 1% ophthalmic solution  --  to each affected eye   -- Indication: For Eye drop    pantoprazole 40 mg oral delayed release tablet  -- 1 tab(s) by mouth once a day (before a meal)  -- Indication: For Stomach health    Centrum Silver Men's  -- 1     -- Indication: For Vitamin

## 2018-02-07 NOTE — DISCHARGE NOTE ADULT - MEDICATION SUMMARY - MEDICATIONS TO STOP TAKING
I will STOP taking the medications listed below when I get home from the hospital:    verapamil 240 mg/24 hours oral capsule, extended release  -- 1 cap(s) by mouth once a day

## 2018-02-08 VITALS — TEMPERATURE: 97 F

## 2018-02-08 LAB
ANION GAP SERPL CALC-SCNC: 10 MMOL/L — SIGNIFICANT CHANGE UP (ref 5–17)
BUN SERPL-MCNC: 40 MG/DL — HIGH (ref 7–23)
CALCIUM SERPL-MCNC: 9.3 MG/DL — SIGNIFICANT CHANGE UP (ref 8.4–10.5)
CHLORIDE SERPL-SCNC: 104 MMOL/L — SIGNIFICANT CHANGE UP (ref 96–108)
CO2 SERPL-SCNC: 25 MMOL/L — SIGNIFICANT CHANGE UP (ref 22–31)
CREAT SERPL-MCNC: 1.86 MG/DL — HIGH (ref 0.5–1.3)
GLUCOSE BLDC GLUCOMTR-MCNC: 128 MG/DL — HIGH (ref 70–99)
GLUCOSE BLDC GLUCOMTR-MCNC: 132 MG/DL — HIGH (ref 70–99)
GLUCOSE SERPL-MCNC: 136 MG/DL — HIGH (ref 70–99)
HCT VFR BLD CALC: 29 % — LOW (ref 39–50)
HGB BLD-MCNC: 9.5 G/DL — LOW (ref 13–17)
MAGNESIUM SERPL-MCNC: 2.2 MG/DL — SIGNIFICANT CHANGE UP (ref 1.6–2.6)
MCHC RBC-ENTMCNC: 29.2 PG — SIGNIFICANT CHANGE UP (ref 27–34)
MCHC RBC-ENTMCNC: 32.8 G/DL — SIGNIFICANT CHANGE UP (ref 32–36)
MCV RBC AUTO: 89.2 FL — SIGNIFICANT CHANGE UP (ref 80–100)
PLATELET # BLD AUTO: 238 K/UL — SIGNIFICANT CHANGE UP (ref 150–400)
POTASSIUM SERPL-MCNC: 4.3 MMOL/L — SIGNIFICANT CHANGE UP (ref 3.5–5.3)
POTASSIUM SERPL-SCNC: 4.3 MMOL/L — SIGNIFICANT CHANGE UP (ref 3.5–5.3)
RBC # BLD: 3.25 M/UL — LOW (ref 4.2–5.8)
RBC # FLD: 15.1 % — SIGNIFICANT CHANGE UP (ref 10.3–16.9)
SODIUM SERPL-SCNC: 139 MMOL/L — SIGNIFICANT CHANGE UP (ref 135–145)
WBC # BLD: 11.5 K/UL — HIGH (ref 3.8–10.5)
WBC # FLD AUTO: 11.5 K/UL — HIGH (ref 3.8–10.5)

## 2018-02-08 PROCEDURE — 80076 HEPATIC FUNCTION PANEL: CPT

## 2018-02-08 PROCEDURE — 86901 BLOOD TYPING SEROLOGIC RH(D): CPT

## 2018-02-08 PROCEDURE — 94150 VITAL CAPACITY TEST: CPT

## 2018-02-08 PROCEDURE — 81001 URINALYSIS AUTO W/SCOPE: CPT

## 2018-02-08 PROCEDURE — 87086 URINE CULTURE/COLONY COUNT: CPT

## 2018-02-08 PROCEDURE — 82553 CREATINE MB FRACTION: CPT

## 2018-02-08 PROCEDURE — 80061 LIPID PANEL: CPT

## 2018-02-08 PROCEDURE — 97162 PT EVAL MOD COMPLEX 30 MIN: CPT

## 2018-02-08 PROCEDURE — 87040 BLOOD CULTURE FOR BACTERIA: CPT

## 2018-02-08 PROCEDURE — 86850 RBC ANTIBODY SCREEN: CPT

## 2018-02-08 PROCEDURE — 82550 ASSAY OF CK (CPK): CPT

## 2018-02-08 PROCEDURE — 82962 GLUCOSE BLOOD TEST: CPT

## 2018-02-08 PROCEDURE — 84295 ASSAY OF SERUM SODIUM: CPT

## 2018-02-08 PROCEDURE — 83605 ASSAY OF LACTIC ACID: CPT

## 2018-02-08 PROCEDURE — P9045: CPT

## 2018-02-08 PROCEDURE — C1889: CPT

## 2018-02-08 PROCEDURE — 93005 ELECTROCARDIOGRAM TRACING: CPT

## 2018-02-08 PROCEDURE — 84481 FREE ASSAY (FT-3): CPT

## 2018-02-08 PROCEDURE — 85025 COMPLETE CBC W/AUTO DIFF WBC: CPT

## 2018-02-08 PROCEDURE — 83880 ASSAY OF NATRIURETIC PEPTIDE: CPT

## 2018-02-08 PROCEDURE — 82803 BLOOD GASES ANY COMBINATION: CPT

## 2018-02-08 PROCEDURE — 84100 ASSAY OF PHOSPHORUS: CPT

## 2018-02-08 PROCEDURE — 74176 CT ABD & PELVIS W/O CONTRAST: CPT

## 2018-02-08 PROCEDURE — 86900 BLOOD TYPING SEROLOGIC ABO: CPT

## 2018-02-08 PROCEDURE — S2900: CPT

## 2018-02-08 PROCEDURE — 85027 COMPLETE CBC AUTOMATED: CPT

## 2018-02-08 PROCEDURE — 85730 THROMBOPLASTIN TIME PARTIAL: CPT

## 2018-02-08 PROCEDURE — 93880 EXTRACRANIAL BILAT STUDY: CPT

## 2018-02-08 PROCEDURE — 86923 COMPATIBILITY TEST ELECTRIC: CPT

## 2018-02-08 PROCEDURE — 97116 GAIT TRAINING THERAPY: CPT

## 2018-02-08 PROCEDURE — 84439 ASSAY OF FREE THYROXINE: CPT

## 2018-02-08 PROCEDURE — 93306 TTE W/DOPPLER COMPLETE: CPT

## 2018-02-08 PROCEDURE — 83036 HEMOGLOBIN GLYCOSYLATED A1C: CPT

## 2018-02-08 PROCEDURE — 71250 CT THORAX DX C-: CPT

## 2018-02-08 PROCEDURE — 83735 ASSAY OF MAGNESIUM: CPT

## 2018-02-08 PROCEDURE — 85610 PROTHROMBIN TIME: CPT

## 2018-02-08 PROCEDURE — 82330 ASSAY OF CALCIUM: CPT

## 2018-02-08 PROCEDURE — 84484 ASSAY OF TROPONIN QUANT: CPT

## 2018-02-08 PROCEDURE — 80048 BASIC METABOLIC PNL TOTAL CA: CPT

## 2018-02-08 PROCEDURE — 84132 ASSAY OF SERUM POTASSIUM: CPT

## 2018-02-08 PROCEDURE — 70450 CT HEAD/BRAIN W/O DYE: CPT

## 2018-02-08 PROCEDURE — 71046 X-RAY EXAM CHEST 2 VIEWS: CPT

## 2018-02-08 PROCEDURE — 80053 COMPREHEN METABOLIC PANEL: CPT

## 2018-02-08 PROCEDURE — 36415 COLL VENOUS BLD VENIPUNCTURE: CPT

## 2018-02-08 PROCEDURE — 76705 ECHO EXAM OF ABDOMEN: CPT

## 2018-02-08 PROCEDURE — 71045 X-RAY EXAM CHEST 1 VIEW: CPT

## 2018-02-08 PROCEDURE — 84443 ASSAY THYROID STIM HORMONE: CPT

## 2018-02-08 PROCEDURE — 82248 BILIRUBIN DIRECT: CPT

## 2018-02-08 RX ORDER — ASPIRIN/CALCIUM CARB/MAGNESIUM 324 MG
1 TABLET ORAL
Qty: 0 | Refills: 0 | DISCHARGE
Start: 2018-02-08

## 2018-02-08 RX ORDER — VERAPAMIL HCL 240 MG
1 CAPSULE, EXTENDED RELEASE PELLETS 24 HR ORAL
Qty: 0 | Refills: 0 | COMMUNITY

## 2018-02-08 RX ORDER — HEPARIN SODIUM 5000 [USP'U]/ML
5000 INJECTION INTRAVENOUS; SUBCUTANEOUS
Qty: 0 | Refills: 0 | DISCHARGE
Start: 2018-02-08

## 2018-02-08 RX ORDER — KETOROLAC TROMETHAMINE 0.5 %
1 DROPS OPHTHALMIC (EYE)
Qty: 0 | Refills: 0 | DISCHARGE
Start: 2018-02-08

## 2018-02-08 RX ORDER — AMLODIPINE BESYLATE 2.5 MG/1
1 TABLET ORAL
Qty: 0 | Refills: 0 | DISCHARGE
Start: 2018-02-08

## 2018-02-08 RX ORDER — AMIODARONE HYDROCHLORIDE 400 MG/1
1 TABLET ORAL
Qty: 0 | Refills: 0 | DISCHARGE
Start: 2018-02-08

## 2018-02-08 RX ORDER — ATORVASTATIN CALCIUM 80 MG/1
1 TABLET, FILM COATED ORAL
Qty: 0 | Refills: 0 | DISCHARGE
Start: 2018-02-08

## 2018-02-08 RX ORDER — POLYETHYLENE GLYCOL 3350 17 G/17G
17 POWDER, FOR SOLUTION ORAL
Qty: 0 | Refills: 0 | COMMUNITY
Start: 2018-02-08

## 2018-02-08 RX ORDER — SIMVASTATIN 20 MG/1
1 TABLET, FILM COATED ORAL
Qty: 0 | Refills: 0 | COMMUNITY

## 2018-02-08 RX ORDER — OFLOXACIN 0.3 %
1 DROPS OPHTHALMIC (EYE)
Qty: 0 | Refills: 0 | DISCHARGE
Start: 2018-02-08

## 2018-02-08 RX ORDER — DOCUSATE SODIUM 100 MG
1 CAPSULE ORAL
Qty: 0 | Refills: 0 | DISCHARGE
Start: 2018-02-08

## 2018-02-08 RX ORDER — METOPROLOL TARTRATE 50 MG
1 TABLET ORAL
Qty: 0 | Refills: 0 | DISCHARGE
Start: 2018-02-08

## 2018-02-08 RX ORDER — CEPHALEXIN 500 MG
1 CAPSULE ORAL
Qty: 84 | Refills: 0
Start: 2018-02-08 | End: 2018-02-28

## 2018-02-08 RX ORDER — ACETAMINOPHEN 500 MG
2 TABLET ORAL
Qty: 0 | Refills: 0 | DISCHARGE
Start: 2018-02-08

## 2018-02-08 RX ORDER — PANTOPRAZOLE SODIUM 20 MG/1
1 TABLET, DELAYED RELEASE ORAL
Qty: 0 | Refills: 0 | DISCHARGE
Start: 2018-02-08

## 2018-02-08 RX ORDER — PREDNISOLONE SODIUM PHOSPHATE 1 %
0 DROPS OPHTHALMIC (EYE)
Qty: 0 | Refills: 0 | DISCHARGE
Start: 2018-02-08

## 2018-02-08 RX ORDER — SENNA PLUS 8.6 MG/1
2 TABLET ORAL
Qty: 0 | Refills: 0 | DISCHARGE
Start: 2018-02-08

## 2018-02-08 RX ORDER — ONDANSETRON 8 MG/1
4 TABLET, FILM COATED ORAL ONCE
Qty: 0 | Refills: 0 | Status: COMPLETED | OUTPATIENT
Start: 2018-02-08 | End: 2018-02-08

## 2018-02-08 RX ADMIN — Medication 1 DROP(S): at 14:52

## 2018-02-08 RX ADMIN — PANTOPRAZOLE SODIUM 40 MILLIGRAM(S): 20 TABLET, DELAYED RELEASE ORAL at 06:52

## 2018-02-08 RX ADMIN — HEPARIN SODIUM 5000 UNIT(S): 5000 INJECTION INTRAVENOUS; SUBCUTANEOUS at 05:51

## 2018-02-08 RX ADMIN — Medication 1 DROP(S): at 05:51

## 2018-02-08 RX ADMIN — SODIUM CHLORIDE 3 MILLILITER(S): 9 INJECTION INTRAMUSCULAR; INTRAVENOUS; SUBCUTANEOUS at 00:33

## 2018-02-08 RX ADMIN — AMLODIPINE BESYLATE 10 MILLIGRAM(S): 2.5 TABLET ORAL at 05:51

## 2018-02-08 RX ADMIN — SODIUM CHLORIDE 3 MILLILITER(S): 9 INJECTION INTRAMUSCULAR; INTRAVENOUS; SUBCUTANEOUS at 06:51

## 2018-02-08 RX ADMIN — Medication 81 MILLIGRAM(S): at 12:30

## 2018-02-08 RX ADMIN — ONDANSETRON 4 MILLIGRAM(S): 8 TABLET, FILM COATED ORAL at 02:00

## 2018-02-08 RX ADMIN — Medication 25 MILLIGRAM(S): at 00:33

## 2018-02-08 RX ADMIN — AMIODARONE HYDROCHLORIDE 200 MILLIGRAM(S): 400 TABLET ORAL at 05:50

## 2018-02-08 RX ADMIN — Medication 25 MILLIGRAM(S): at 05:51

## 2018-02-08 RX ADMIN — Medication 25 MILLIGRAM(S): at 12:30

## 2018-02-08 NOTE — PROGRESS NOTE ADULT - SUBJECTIVE AND OBJECTIVE BOX
Patient discussed on morning rounds with Dr. Galvez    Operation / Date: MIDCAB on 1/31/18    Surgeon: Dr. Galvez    Referring Physician: Dr. Schrader    SUBJECTIVE ASSESSMENT:  85y Male seen at bedside this AM.  He feels well and is frustrated that he has not yet been placed in a rehab facility as he wants to leave the hospital.  Denies any acute overnight events, endorses post-op BM and feels ready to go. Denies HA, AMS, CP, palpitations, SOB, cough, hemoptysis, n/v/d, fever.     Hospital Course:  This is an 85 year old male, poor historian, with history of HTN, HLD, BPH, AAA s/p repair in 2008 at [facility] who [place] [facility] [place] and syncopal [place] at [place] with subsequent workup revealing NSTEMI.  He underwent cardaic catheterization during that admission which demonstrated pLAD 80% stenosis, mCirc diffuse 80% stenosis, OM1 99% in the third proximal segment, pRCA 100% stenosis, EF 50% apical hypokinesis, apical septal hypokinesis. Blood cultures were obtained as well during that admission which resulted for E.coli for which empiric abx were initiated.  After referral to Dr. Dee for surgical evaluation, he was transferred ot [facility] under his care on 1/27/18 to complete perioperative evaluation.  CT Chest revealed severe aortic calcification with associated high risk for sternotomy CABG and he was deemed candidate for MIDCAB, which he underwent on 1/31/18 with Dr. Bello Galvez.  After an uncomplicated intraoperative course, he arrived to CTICU in stable condition, extubated overnight.  He remained stable POD 1-2, BB started on POD 2.  POD 3, pt noted to have sundowning with delirium overnight.  Received 2x Amio IVPB for afib and mentation improved throughout the day.  POD 4, stable.  POD 5, Cr 2.1.  Pt noted to have pleural effusion for which he underwent uncomplicated thoracentesis, draining 500cc serosanguinous fluid. Rodriguez out, +TOV.  Transferred to floor care.  POD 6, AM CXR demonstrated left pleural effusion, confirmed with bedside ultrasound.  Left pleural pigtail catheter was placed, draining 550cc serosanguinous fluid and removed later that day with stable f/u CXR.  Creatinine trending down.  On 2/7/18, pt is POD 7 and progressing very well, tolerating room air at rest and with ambulation, and has had a post-op BM.  After evaluation in AM by Dr. Galvez, he was medically cleared for discharge to sub-acute rehab facility on PO Keflex to complete a 4 week course of antibiotic therapy with plan for outpatient follow-up appointments arranged. He remained inpatient overnight due to pending insurance authorization for accepted rehab bed. Prior to discharge, medications and instructions were discussed thoroughly with the patient.     Vital Signs Last 24 Hrs  T(C): 36.5 (08 Feb 2018 09:26), Max: 37.3 (07 Feb 2018 21:49)  T(F): 97.7 (08 Feb 2018 09:26), Max: 99.2 (07 Feb 2018 21:49)  HR: 64 (08 Feb 2018 12:33) (56 - 76)  BP: 125/58 (08 Feb 2018 12:33) (125/58 - 182/78)  BP(mean): 84 (08 Feb 2018 12:33) (84 - 121)  RR: 16 (08 Feb 2018 12:33) (16 - 18)  SpO2: 95% (08 Feb 2018 12:33) (94% - 98%)  I&O's Detail    07 Feb 2018 07:01  -  08 Feb 2018 07:00  --------------------------------------------------------  IN:    Oral Fluid: 140 mL  Total IN: 140 mL    OUT:    Voided: 200 mL  Total OUT: 200 mL    Total NET: -60 mL      08 Feb 2018 07:01  -  08 Feb 2018 13:37  --------------------------------------------------------  IN:    Oral Fluid: 240 mL  Total IN: 240 mL    OUT:  Total OUT: 0 mL    Total NET: 240 mL    EPICARDIAL WIRES REMOVED: NA.  TIE DOWNS REMOVED: Yes.    PHYSICAL EXAM:  General: OOB to chair, no acute distress.   Neurological: AAOx3, no AMS or focal deficits.   Cardiovascular: RRR, S1/S2, no m/r/g.   Respiratory: No acute distress on RA.  CTA b/l, no w/r/r.   Gastrointestinal: ND, NBS, non-TTP.  Extremities: Warm and well perfused, trace b/l LE edema non-pitting.   Vascular: Pulses 2+  Incision Sites: L thoracotomy: C/D/I    LABS:                        9.5    11.5  )-----------( 238      ( 08 Feb 2018 05:39 )             29.0       COUMADIN:  No.      02-08    139  |  104  |  40<H>  ----------------------------<  136<H>  4.3   |  25  |  1.86<H>    Ca    9.3      08 Feb 2018 05:39  Mg     2.2     02-08    MEDICATIONS  (STANDING):    I will START or STAY ON the medications listed below when I get home from the hospital:    aspirin 81 mg oral delayed release tablet  -- 1 tab(s) by mouth once a day  -- Indication: For Blood thinner    acetaminophen 325 mg oral tablet  -- 2 tab(s) by mouth every 6 hours, As needed, Mild Pain (1 - 3)  -- Indication: For Pain    Cardura 4 mg oral tablet  -- 1 tab(s) by mouth once a day  -- Indication: For Blood pressure    amiodarone 200 mg oral tablet  -- 1 tab(s) by mouth once a day  -- Indication: For Heart rate control    heparin  -- 5000 unit(s) subcutaneously every 8 hours  -- Indication: For Blood thinner     atorvastatin 80 mg oral tablet  -- 1 tab(s) by mouth once a day (at bedtime)  -- Indication: For Cholesterol    metoprolol tartrate 25 mg oral tablet  -- 1 tab(s) by mouth every 6 hours  -- Indication: For Blood pressure    amLODIPine 10 mg oral tablet  -- 1 tab(s) by mouth once a day  -- Indication: For Blood pressure    cephalexin 500 mg oral tablet  -- 1 tab(s) by mouth every 6 hours    End Date: 3/1/18  -- Finish all this medication unless otherwise directed by prescriber.    -- Indication: For Antibiotic, end date 3/1/18    docusate sodium 100 mg oral capsule  -- 1 cap(s) by mouth 2 times a day  -- Indication: For Stool softener    polyethylene glycol 3350 oral powder for reconstitution  -- 17 gram(s) by mouth once a day, As Needed for constipation  -- Indication: For Stool softener/laxative    senna oral tablet  -- 2 tab(s) by mouth once a day (at bedtime)  -- Indication: For Stool softener    ketorolac 0.5% ophthalmic solution  -- 1 drop(s) to each affected eye 3 times a day  -- Indication: For Eye drop    ofloxacin 0.3% ophthalmic solution  -- 1 drop(s) to each affected eye 3 times a day  -- Indication: For Eye drop    prednisoLONE sodium phosphate 1% ophthalmic solution  --  to each affected eye   -- Indication: For Eye drop    pantoprazole 40 mg oral delayed release tablet  -- 1 tab(s) by mouth once a day (before a meal)  -- Indication: For Stomach health    Centrum Silver Men's  -- 1     -- Indication: For Vitamin.     I will STOP taking the medications listed below when I get home from the hospital:    verapamil 240 mg/24 hours oral capsule, extended release  -- 1 cap(s) by mouth once a day.     I will SWITCH the dose or number of times a day I take the medications listed below when I get home from the hospital:  None.    Discharge CXR:  < from: Xray Chest 2 Views PA/Lat (02.06.18 @ 17:46) >    EXAM:  XR CHEST PA LAT 2V                          PROCEDURE DATE:  02/06/2018         INTERPRETATION:  CHEST X-RAY dated 2/6/2018 5:46 PM    Indication: s/p pigtail    PA and lateral views of the chest are compared to 2/6/2018 at 12:20PM.   The left basal pigtail catheter has been removed. Left lower lobe   infiltrate/atelectasis. Small bilateral pleural effusions. Degenerative   changes and scoliosis of the thoracic spine.    IMPRESSION:  Left lower lobe infiltrate/atelectasis. Small bilateral pleural effusions.    < end of copied text >    Discharge ECHO:  < from: Echocardiogram (01.29.18 @ 11:09) >  Interpretation Summary  Left ventricular hypertrophy presentThe left ventricular ejection   fraction is   estimated to be 50-55%The right ventricle is not well visualized.Probably   normal right ventricular size and function.The left atrium is dilated.The   mitral inflow pattern is consistent with impaired left ventricular   relaxation   with mildly elevated left atrial pressure (8-14mmHg).  Calcified aortic   valve.No aortic regurgitation noted.No hemodynamically significant   valvular   aortic stenosis.Mitral valve thickening noted.No mitral regurgitation   noted.There is trace tricuspid regurgitation.There was insufficient TR   detected from which to calculate pulmonary artery systolic pressure.    There is   trace pulmonic regurgitation.The inferior vena cava was not well   visualized.There is no pericardial effusion.    < end of copied text >

## 2018-02-08 NOTE — PROGRESS NOTE ADULT - ASSESSMENT
-Please follow up with Dr. Bello Galvez on 2/13/18 at 1:30pm.  The office is located at [facility], 4th floor. Call us with any questions  #[phone].     -Dr. Easton Schrader (Cardiologist who referred you to Dr. Galvez) would like you to follow-up with him on 2/21/18 at 11:30am.  His office is located at [facility], [place].  Phone: [phone].     -You were noted to have a bacteria in your blood called EMaddisoncoli prior to your surgery.  This has been treated with antibiotics given through your IV and you will continue taking an antibiotic call Keflex (Cephalexin) 500mg by mouth every 6 hours for a total of 3 weeks.  You will be seen in Dr. Galvez's office while you are taking this medication and any changes that need to be made to this medication will be addressed then.

## 2018-02-08 NOTE — CHART NOTE - NSCHARTNOTEFT_GEN_A_CORE
Admitting Diagnosis:   Patient is a 85y old  Male who presents with a chief complaint of 3 vessel CAD (07 Feb 2018 10:49)      PAST MEDICAL & SURGICAL HISTORY:  Hemorrhoids  Hypercholesterolemia  Benign prostate hyperplasia  Hypertension  History of tonsillectomy  Status post cataract extraction: bilateral eyes  AAA (abdominal aortic aneurysm)      Current Nutrition Order:  Dash/TLC    PO Intake:   Reports no appetite, but eating ~50-75% of meals.  Recalls consuming 1x banana, 1x yogurt, coffee and roll for breakfast today.    GI Issues:   Nausea at times  Denies current GI distress  Last BM yesterday    Pain:  Controlled     Skin Integrity:  IAD sacral/gluteal region  L chest rash  B/L groin ASW    Labs:   02-08    139  |  104  |  40<H>  ----------------------------<  136<H>  4.3   |  25  |  1.86<H>    Ca    9.3      08 Feb 2018 05:39  Mg     2.2     02-08      CAPILLARY BLOOD GLUCOSE      POCT Blood Glucose.: 128 mg/dL (08 Feb 2018 11:16)  POCT Blood Glucose.: 132 mg/dL (08 Feb 2018 05:25)  POCT Blood Glucose.: 96 mg/dL (07 Feb 2018 21:16)  POCT Blood Glucose.: 128 mg/dL (07 Feb 2018 16:29)      Medications:  MEDICATIONS  (STANDING):  amiodarone    Tablet 200 milliGRAM(s) Oral daily  amLODIPine   Tablet 10 milliGRAM(s) Oral daily  aspirin enteric coated 81 milliGRAM(s) Oral daily  atorvastatin 80 milliGRAM(s) Oral at bedtime  cefTRIAXone Injectable. 2000 milliGRAM(s) IV Push every 24 hours  dextrose 5%. 1000 milliLiter(s) (50 mL/Hr) IV Continuous <Continuous>  dextrose 50% Injectable 12.5 Gram(s) IV Push once  dextrose 50% Injectable 25 Gram(s) IV Push once  dextrose 50% Injectable 25 Gram(s) IV Push once  docusate sodium 100 milliGRAM(s) Oral two times a day  doxazosin 4 milliGRAM(s) Oral at bedtime  heparin  Injectable 5000 Unit(s) SubCutaneous every 8 hours  insulin lispro (HumaLOG) corrective regimen sliding scale   SubCutaneous Before meals and at bedtime  ketorolac 0.5% Ophthalmic Solution 1 Drop(s) Right EYE three times a day  metoprolol     tartrate 25 milliGRAM(s) Oral every 6 hours  ofloxacin 0.3% Solution 1 Drop(s) Right EYE three times a day  pantoprazole    Tablet 40 milliGRAM(s) Oral before breakfast  polyethylene glycol 3350 17 Gram(s) Oral daily  prednisoLONE  forte 1% Suspension 1 Drop(s) Right EYE three times a day  senna 2 Tablet(s) Oral at bedtime  sodium chloride 0.45%. 1000 milliLiter(s) (10 mL/Hr) IV Continuous <Continuous>  sodium chloride 0.9% lock flush 3 milliLiter(s) IV Push every 8 hours  sodium chloride 0.9%. 1000 milliLiter(s) (50 mL/Hr) IV Continuous <Continuous>  sodium chloride 0.9%. 1000 milliLiter(s) (65 mL/Hr) IV Continuous <Continuous>    MEDICATIONS  (PRN):  acetaminophen   Tablet. 650 milliGRAM(s) Oral every 6 hours PRN Mild Pain (1 - 3)  dextrose Gel 1 Dose(s) Oral once PRN Blood Glucose LESS THAN 70 milliGRAM(s)/deciliter  glucagon  Injectable 1 milliGRAM(s) IntraMuscular once PRN Glucose LESS THAN 70 milligrams/deciliter      Weight:  1/27: 87.2 kg  1/31: 87.2 kg    Weight Change:    Take current to trend    Estimated energy needs:   IBW: 70kg, 125% of IBW; needs per age and post-op status    Kcal: 25-30kcal/IBW: 1745-2094Kcal  protein: 1.2-1.4g/IBW: 84-98g  Fluids: 30-35ml/IBW: 2094-2443ml    Subjective:   86y/o M s/p MIDCAB. Awaiting [facility] placement. He reports having no appetite, but eating ~50-75% of meals stating he knows he needs the energy. Refusing ONS at present. Denies current GI distress and pain. Reinforced Dash/TLC diet and adequate intake post-op.      Previous Nutrition Diagnosis:   Unintended weight loss r/t suspected decreased in PO intake PTA AEB pt reports 50 lb wt loss over unclear time frame      Active [X]  Resolved [   ]    If resolved, new PES:     Goal:   Pt to meet 75% of needs PO    Recommendations:   1. Continue current diet.   2. Encourage intake and honor pt's preferences within dietary constraints.   3. Monitor lytes and replete prn.   4. Take current wt.     Education:   DASH/TLC diet reinforcement; adequate intake post-op    Risk Level: High [X] Moderate [   ] Low [   ].

## 2018-02-08 NOTE — PROGRESS NOTE ADULT - PROVIDER SPECIALTY LIST ADULT
CT Surgery
Critical Care
CT Surgery

## 2018-02-10 RX ORDER — DOXAZOSIN 4 MG/1
4 TABLET ORAL DAILY
Refills: 0 | Status: ACTIVE | COMMUNITY

## 2018-02-10 RX ORDER — KETOROLAC TROMETHAMINE 5 MG/ML
0.5 SOLUTION OPHTHALMIC
Refills: 0 | Status: ACTIVE | COMMUNITY

## 2018-02-10 RX ORDER — ATORVASTATIN CALCIUM 80 MG/1
80 TABLET, FILM COATED ORAL
Qty: 1 | Refills: 0 | Status: ACTIVE | COMMUNITY

## 2018-02-10 RX ORDER — ASPIRIN 81 MG
81 TABLET, DELAYED RELEASE (ENTERIC COATED) ORAL DAILY
Qty: 30 | Refills: 6 | Status: ACTIVE | COMMUNITY

## 2018-02-10 RX ORDER — PREDNISOLONE ACETATE 10 MG/ML
1 SUSPENSION/ DROPS OPHTHALMIC
Refills: 0 | Status: ACTIVE | COMMUNITY

## 2018-02-10 RX ORDER — OFLOXACIN 3 MG/ML
0.3 SOLUTION/ DROPS OPHTHALMIC
Refills: 0 | Status: ACTIVE | COMMUNITY

## 2018-02-10 RX ORDER — MULTIVIT-MIN/FA/LYCOPEN/LUTEIN .4-300-25
TABLET ORAL DAILY
Refills: 0 | Status: ACTIVE | COMMUNITY

## 2018-02-10 RX ORDER — CEPHALEXIN 500 MG/1
500 CAPSULE ORAL EVERY 6 HOURS
Refills: 0 | Status: ACTIVE | COMMUNITY

## 2018-02-10 RX ORDER — AMLODIPINE BESYLATE 10 MG/1
10 TABLET ORAL DAILY
Qty: 30 | Refills: 3 | Status: ACTIVE | COMMUNITY

## 2018-02-13 ENCOUNTER — APPOINTMENT (OUTPATIENT)
Dept: CARDIOTHORACIC SURGERY | Facility: CLINIC | Age: 83
End: 2018-02-13

## 2018-02-16 DIAGNOSIS — K80.20 CALCULUS OF GALLBLADDER WITHOUT CHOLECYSTITIS WITHOUT OBSTRUCTION: ICD-10-CM

## 2018-02-16 DIAGNOSIS — I12.9 HYPERTENSIVE CHRONIC KIDNEY DISEASE WITH STAGE 1 THROUGH STAGE 4 CHRONIC KIDNEY DISEASE, OR UNSPECIFIED CHRONIC KIDNEY DISEASE: ICD-10-CM

## 2018-02-16 DIAGNOSIS — I21.4 NON-ST ELEVATION (NSTEMI) MYOCARDIAL INFARCTION: ICD-10-CM

## 2018-02-16 DIAGNOSIS — I25.110 ATHEROSCLEROTIC HEART DISEASE OF NATIVE CORONARY ARTERY WITH UNSTABLE ANGINA PECTORIS: ICD-10-CM

## 2018-02-16 DIAGNOSIS — J90 PLEURAL EFFUSION, NOT ELSEWHERE CLASSIFIED: ICD-10-CM

## 2018-02-16 DIAGNOSIS — E78.00 PURE HYPERCHOLESTEROLEMIA, UNSPECIFIED: ICD-10-CM

## 2018-02-16 DIAGNOSIS — I70.0 ATHEROSCLEROSIS OF AORTA: ICD-10-CM

## 2018-02-16 DIAGNOSIS — K57.92 DIVERTICULITIS OF INTESTINE, PART UNSPECIFIED, WITHOUT PERFORATION OR ABSCESS WITHOUT BLEEDING: ICD-10-CM

## 2018-02-16 DIAGNOSIS — R32 UNSPECIFIED URINARY INCONTINENCE: ICD-10-CM

## 2018-02-16 DIAGNOSIS — N17.0 ACUTE KIDNEY FAILURE WITH TUBULAR NECROSIS: ICD-10-CM

## 2018-02-16 DIAGNOSIS — I97.89 OTHER POSTPROCEDURAL COMPLICATIONS AND DISORDERS OF THE CIRCULATORY SYSTEM, NOT ELSEWHERE CLASSIFIED: ICD-10-CM

## 2018-02-16 DIAGNOSIS — Z82.49 FAMILY HISTORY OF ISCHEMIC HEART DISEASE AND OTHER DISEASES OF THE CIRCULATORY SYSTEM: ICD-10-CM

## 2018-02-16 DIAGNOSIS — F05 DELIRIUM DUE TO KNOWN PHYSIOLOGICAL CONDITION: ICD-10-CM

## 2018-02-16 DIAGNOSIS — K64.9 UNSPECIFIED HEMORRHOIDS: ICD-10-CM

## 2018-02-16 DIAGNOSIS — R19.5 OTHER FECAL ABNORMALITIES: ICD-10-CM

## 2018-02-16 DIAGNOSIS — I48.91 UNSPECIFIED ATRIAL FIBRILLATION: ICD-10-CM

## 2018-02-16 DIAGNOSIS — N40.0 BENIGN PROSTATIC HYPERPLASIA WITHOUT LOWER URINARY TRACT SYMPTOMS: ICD-10-CM

## 2018-02-16 DIAGNOSIS — Z98.41 CATARACT EXTRACTION STATUS, RIGHT EYE: ICD-10-CM

## 2018-02-16 DIAGNOSIS — R78.81 BACTEREMIA: ICD-10-CM

## 2018-02-16 DIAGNOSIS — Z28.09 IMMUNIZATION NOT CARRIED OUT BECAUSE OF OTHER CONTRAINDICATION: ICD-10-CM

## 2018-02-16 DIAGNOSIS — B96.20 UNSPECIFIED ESCHERICHIA COLI [E. COLI] AS THE CAUSE OF DISEASES CLASSIFIED ELSEWHERE: ICD-10-CM

## 2018-02-16 DIAGNOSIS — I25.82 CHRONIC TOTAL OCCLUSION OF CORONARY ARTERY: ICD-10-CM

## 2018-02-16 DIAGNOSIS — Z79.899 OTHER LONG TERM (CURRENT) DRUG THERAPY: ICD-10-CM

## 2018-02-16 DIAGNOSIS — I73.9 PERIPHERAL VASCULAR DISEASE, UNSPECIFIED: ICD-10-CM

## 2018-02-16 DIAGNOSIS — Z98.42 CATARACT EXTRACTION STATUS, LEFT EYE: ICD-10-CM

## 2018-02-16 DIAGNOSIS — Z87.891 PERSONAL HISTORY OF NICOTINE DEPENDENCE: ICD-10-CM

## 2018-02-16 DIAGNOSIS — N18.9 CHRONIC KIDNEY DISEASE, UNSPECIFIED: ICD-10-CM

## 2018-02-16 DIAGNOSIS — Z78.1 PHYSICAL RESTRAINT STATUS: ICD-10-CM

## 2018-02-27 ENCOUNTER — APPOINTMENT (OUTPATIENT)
Dept: CARDIOTHORACIC SURGERY | Facility: CLINIC | Age: 83
End: 2018-02-27

## 2018-07-26 PROBLEM — Z23 PNEUMOCOCCAL VACCINATION GIVEN: Status: RESOLVED | Noted: 2017-07-27 | Resolved: 2018-07-26

## 2018-08-01 PROBLEM — N40.0 BENIGN PROSTATIC HYPERPLASIA WITHOUT LOWER URINARY TRACT SYMPTOMS: Chronic | Status: ACTIVE | Noted: 2018-01-27

## 2018-08-01 PROBLEM — E78.00 PURE HYPERCHOLESTEROLEMIA, UNSPECIFIED: Chronic | Status: ACTIVE | Noted: 2018-01-27

## 2018-08-01 PROBLEM — K64.9 UNSPECIFIED HEMORRHOIDS: Chronic | Status: ACTIVE | Noted: 2018-01-27

## 2018-08-01 PROBLEM — I10 ESSENTIAL (PRIMARY) HYPERTENSION: Chronic | Status: ACTIVE | Noted: 2017-08-08

## 2018-08-09 ENCOUNTER — APPOINTMENT (OUTPATIENT)
Dept: GASTROENTEROLOGY | Facility: CLINIC | Age: 83
End: 2018-08-09
Payer: COMMERCIAL

## 2018-08-09 VITALS
BODY MASS INDEX: 44.57 KG/M2 | TEMPERATURE: 97.5 F | HEIGHT: 67 IN | RESPIRATION RATE: 16 BRPM | HEART RATE: 65 BPM | WEIGHT: 284 LBS | DIASTOLIC BLOOD PRESSURE: 82 MMHG | SYSTOLIC BLOOD PRESSURE: 138 MMHG | OXYGEN SATURATION: 98 %

## 2018-08-09 PROCEDURE — 99214 OFFICE O/P EST MOD 30 MIN: CPT

## 2018-12-08 NOTE — ASU PATIENT PROFILE, ADULT - CAREGIVER
Reason For Visit  Jamar Jacobs is an established patient here today for a chief complaint of Pain of Right hand and wrist for 1 month. Jamar Jacobs was offered a chaperone, but declined. He is unaccompanied. Quality    Adult Wellness CI height documented, discussion of regular exercise, exercising regularly, printed information given for activities, discussion of nutritional quality of diet, alcohol use, no tobacco use, does not have feelings of hopelessness (PHQ-2) and no Anhedonia (PHQ-2). History of Present Illness  pt is here for in the right wrist which has been ongoing for the past 2 months. 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 Many movements of the right wrist have been painful. He has been wearing a brace per his friends recommendation who is a occupational therapist however has not seen much improvement. He denies any numbness or tingling of his fingers. Review of Systems    Musc:. Per HPI. All other systems reviewed and negative. Current Meds   1. No Reported Medications Recorded    Active Problems  Cervical lymphadenopathy (R59.0)  Fever and chills (R50.9)  Foreign material (T14.8XXA)  Hyperthyroidism (E05.90)  Laceration of finger (S61.219A)  Malaise (R53.81)  Sinus tachycardia (R00.0)  Sore throat (J02.9)  Subacute thyroiditis (E06.1)  Sweating increase (R61)  Thrombocytosis (D47.3)  Thyroid enlarged (E04.9)  Thyroiditis, acute (E06.0)    Surgical History  Denied: History of Surgery    Social History  Never a smoker  Social alcohol use (Z78.9)    Vitals  Signs   Recorded: [id number] 06:01PM   Height: 5 ft 9 in  Weight: 164 lb 8.0 oz  BMI Calculated: 24.29  BSA Calculated: 1.9  Systolic: 052, RUE  Diastolic: 70, RUE  Temperature: 96.4 F, Tympanic  Heart Rate: 89  Respiration: 18  O2 Saturation: 98    Physical Exam  Constitutional: alert, in no acute distress and current vital signs reviewed. Head and Face: atraumatic, normocephalic. no tenderness of facial sinuses. Eyes: no discharge, normal conjunctiva and the sclerae were normal. pupils equal, round and reactive to light and accommodation. ENT: tonsils not enlarged, normal appearing pharynx. Pulmonary: no respiratory distress and normal respiratory rate and effort. breath sounds clear to auscultation bilaterally. Cardiovascular: normal rate, regular rhythm, normal S1 and normal S2. Musculoskeletal:   Hand/Fingers (Right): Right Hand Appearance: Normal. Tenderness: None. Range of Motion: abnormal AROM and normal PROM. Neurologic: no sensory deficits noted. Psychiatric: oriented to person, oriented to place and oriented to time. alert and awake and interactive. Skin, Hair, Nails: no rash. Immunizations  Tdap --- Nova Junk: 01-Jun-2018     Assessment  Right wrist pain (M25.531)   Â· Assessed By: Alisha Tatum (Primary Care); Last Assessed: 14 Nov 2018  Edema leg (R60.0)    Plan  NT PRO BNP; Status:Active; Requested for:[id number]; Orthopedics Referral/Consult Treatment and Evaluation  3 visits  Status: Active   Requested for: [id number]  Care Summary provided. : Yes  Plans:     Plan:   wrist injury:  referred to ortho. Medical compliance with plan discussed and risks of non-compliance reviewed. Patient education completed on disease process, etiology & prognosis. Patient expresses understanding of the plan.       Signatures   Electronically signed by : Jean Claude Duncan, ; Nov 14 2018  6:04PM CST    Electronically signed by : Alisha Tatum MD; Nov 15 2018  4:21PM CST No

## 2019-04-30 ENCOUNTER — APPOINTMENT (OUTPATIENT)
Dept: GASTROENTEROLOGY | Facility: CLINIC | Age: 84
End: 2019-04-30
Payer: MEDICARE

## 2019-04-30 VITALS
OXYGEN SATURATION: 98 % | TEMPERATURE: 97.7 F | HEART RATE: 72 BPM | HEIGHT: 67 IN | WEIGHT: 182 LBS | DIASTOLIC BLOOD PRESSURE: 80 MMHG | BODY MASS INDEX: 28.56 KG/M2 | SYSTOLIC BLOOD PRESSURE: 170 MMHG

## 2019-04-30 DIAGNOSIS — Z95.1 PRESENCE OF AORTOCORONARY BYPASS GRAFT: ICD-10-CM

## 2019-04-30 DIAGNOSIS — I25.10 ATHEROSCLEROTIC HEART DISEASE OF NATIVE CORONARY ARTERY W/OUT ANGINA PECTORIS: ICD-10-CM

## 2019-04-30 PROCEDURE — 99214 OFFICE O/P EST MOD 30 MIN: CPT

## 2019-04-30 RX ORDER — LUBIPROSTONE 24 UG/1
24 CAPSULE, GELATIN COATED ORAL TWICE DAILY
Qty: 60 | Refills: 0 | Status: COMPLETED | COMMUNITY
Start: 2018-08-09 | End: 2019-04-30

## 2019-04-30 RX ORDER — LACTULOSE 20 G/20G
20 POWDER, FOR SOLUTION ORAL
Qty: 60 | Refills: 3 | Status: ACTIVE | COMMUNITY
Start: 2019-04-30 | End: 1900-01-01

## 2019-04-30 RX ORDER — PANTOPRAZOLE 40 MG/1
40 TABLET, DELAYED RELEASE ORAL DAILY
Qty: 30 | Refills: 5 | Status: DISCONTINUED | COMMUNITY
End: 2019-04-30

## 2019-04-30 NOTE — PHYSICAL EXAM
[General Appearance - Alert] : alert [General Appearance - In No Acute Distress] : in no acute distress [Sclera] : the sclera and conjunctiva were normal [PERRL With Normal Accommodation] : pupils were equal in size, round, and reactive to light [Extraocular Movements] : extraocular movements were intact [Outer Ear] : the ears and nose were normal in appearance [Oropharynx] : the oropharynx was normal [Neck Appearance] : the appearance of the neck was normal [Neck Cervical Mass (___cm)] : no neck mass was observed [Jugular Venous Distention Increased] : there was no jugular-venous distention [Thyroid Diffuse Enlargement] : the thyroid was not enlarged [Thyroid Nodule] : there were no palpable thyroid nodules [Auscultation Breath Sounds / Voice Sounds] : lungs were clear to auscultation bilaterally [Heart Rate And Rhythm] : heart rate was normal and rhythm regular [Heart Sounds] : normal S1 and S2 [Heart Sounds Gallop] : no gallops [Murmurs] : no murmurs [Heart Sounds Pericardial Friction Rub] : no pericardial rub [Normal] : normal [Soft, Nontender] : the abdomen was soft and nontender [No Mass] : no masses were palpated [No HSM] : no hepatosplenomegaly noted [Skin Color & Pigmentation] : normal skin color and pigmentation [Skin Turgor] : normal skin turgor [] : no rash [Deep Tendon Reflexes (DTR)] : deep tendon reflexes were 2+ and symmetric [Sensation] : the sensory exam was normal to light touch and pinprick [No Focal Deficits] : no focal deficits [Oriented To Time, Place, And Person] : oriented to person, place, and time [Impaired Insight] : insight and judgment were intact [Affect] : the affect was normal

## 2019-04-30 NOTE — HISTORY OF PRESENT ILLNESS
[de-identified] : Had negative Cologuard \par \par Self Disimpacts \par \par The causes of constipation were discussed at length  We discussed : Eat three meals each day. Do not skip meals. Gradually increase the amount of FIBER  in your diet. Choose more whole grain breads, cereals and rice. Select more raw fruits and vegetables -- eat the peel, if appropriate. Read food labels and look for the "dietary fiber" content of foods. Good sources have 2 grams of fiber or more. Drink six to eight glasses of water each day. Limit highly refined and processed foods. \par \par \par  A low FODMAP diet was discussed with the patient at length. The patient had multiple questions all of which were answered. I recommended a nutritionist. Also recommended that the patient keep a food diary. We discussed  options such as Vegetables. Fresh fruits. Dairy that is lactose-free, and hard cheeses, or ripened/matured cheeses including... Beef, pork, chicken, fish, eggs. Avoid breadcrumbs, marinades, and sauces/gravies that may be high in FODMAPs. Soy products including tofu, tempeh. Grains.\par \par

## 2019-05-01 RX ORDER — LACTULOSE 10 G/15ML
10 SOLUTION ORAL
Qty: 1 | Refills: 0 | Status: ACTIVE | COMMUNITY
Start: 2019-05-01 | End: 1900-01-01

## 2019-06-18 ENCOUNTER — APPOINTMENT (OUTPATIENT)
Dept: GASTROENTEROLOGY | Facility: CLINIC | Age: 84
End: 2019-06-18
Payer: MEDICARE

## 2019-06-18 VITALS
HEIGHT: 67 IN | DIASTOLIC BLOOD PRESSURE: 80 MMHG | SYSTOLIC BLOOD PRESSURE: 170 MMHG | BODY MASS INDEX: 29.51 KG/M2 | HEART RATE: 85 BPM | WEIGHT: 188 LBS | TEMPERATURE: 97.9 F | OXYGEN SATURATION: 97 %

## 2019-06-18 DIAGNOSIS — K59.09 OTHER CONSTIPATION: ICD-10-CM

## 2019-06-18 DIAGNOSIS — K21.9 GASTRO-ESOPHAGEAL REFLUX DISEASE W/OUT ESOPHAGITIS: ICD-10-CM

## 2019-06-18 PROCEDURE — 99213 OFFICE O/P EST LOW 20 MIN: CPT

## 2019-06-18 RX ORDER — AMIODARONE HYDROCHLORIDE 200 MG/1
200 TABLET ORAL DAILY
Qty: 30 | Refills: 0 | Status: COMPLETED | COMMUNITY
End: 2019-06-18

## 2019-06-18 RX ORDER — METOPROLOL TARTRATE 25 MG/1
25 TABLET, FILM COATED ORAL EVERY 6 HOURS
Qty: 240 | Refills: 1 | Status: COMPLETED | COMMUNITY
End: 2019-06-18

## 2019-06-18 NOTE — PHYSICAL EXAM
[General Appearance - Alert] : alert [General Appearance - In No Acute Distress] : in no acute distress [Sclera] : the sclera and conjunctiva were normal [Extraocular Movements] : extraocular movements were intact [PERRL With Normal Accommodation] : pupils were equal in size, round, and reactive to light [Outer Ear] : the ears and nose were normal in appearance [Neck Appearance] : the appearance of the neck was normal [Oropharynx] : the oropharynx was normal [Jugular Venous Distention Increased] : there was no jugular-venous distention [Neck Cervical Mass (___cm)] : no neck mass was observed [Thyroid Diffuse Enlargement] : the thyroid was not enlarged [Thyroid Nodule] : there were no palpable thyroid nodules [Auscultation Breath Sounds / Voice Sounds] : lungs were clear to auscultation bilaterally [Heart Rate And Rhythm] : heart rate was normal and rhythm regular [Heart Sounds] : normal S1 and S2 [Murmurs] : no murmurs [Heart Sounds Gallop] : no gallops [Heart Sounds Pericardial Friction Rub] : no pericardial rub [Normal] : normal [Soft, Nontender] : the abdomen was soft and nontender [No HSM] : no hepatosplenomegaly noted [No Mass] : no masses were palpated [Skin Color & Pigmentation] : normal skin color and pigmentation [Skin Turgor] : normal skin turgor [] : no rash [Deep Tendon Reflexes (DTR)] : deep tendon reflexes were 2+ and symmetric [Sensation] : the sensory exam was normal to light touch and pinprick [No Focal Deficits] : no focal deficits [Oriented To Time, Place, And Person] : oriented to person, place, and time [Impaired Insight] : insight and judgment were intact [Affect] : the affect was normal

## 2019-06-18 NOTE — HISTORY OF PRESENT ILLNESS
[de-identified] : FIT negative \par \par Bowels better with fiber \par \par Advised to try prune juice \par \par  A low FODMAP diet was discussed with the patient at length. The patient had multiple questions all of which were answered. I recommended a nutritionist. Also recommended that the patient keep a food diary. We discussed  options such as Vegetables. Fresh fruits. Dairy that is lactose-free, and hard cheeses, or ripened/matured cheeses including... Beef, pork, chicken, fish, eggs. Avoid breadcrumbs, marinades, and sauces/gravies that may be high in FODMAPs. Soy products including tofu, tempeh. Grains.\par \par \par The causes of constipation were discussed at length  We discussed : Eat three meals each day. Do not skip meals. Gradually increase the amount of FIBER  in your diet. Choose more whole grain breads, cereals and rice. Select more raw fruits and vegetables -- eat the peel, if appropriate. Read food labels and look for the "dietary fiber" content of foods. Good sources have 2 grams of fiber or more. Drink six to eight glasses of water each day. Limit highly refined and processed foods. \par \par

## 2019-09-24 NOTE — PATIENT PROFILE ADULT. - PAIN CHRONIC, PROFILE
patient lives with son and daughter- in law in private house. 5 steps to enter with bilateral handrails. stairs throughout home no

## 2021-06-09 NOTE — PATIENT PROFILE ADULT. - PRO ANTICIPATED DISCH DISP
INR stable. Discussed continuing management of dose of Warfarin and returning in one month . No changes to diet needed at this time. Continue moderate intake of Vitamin K and call if increase bruising or unexplained bleeding. Call with medication changes or upcoming procedures.    
unsure

## 2022-03-27 NOTE — H&P ADULT - PSH
AAA (abdominal aortic aneurysm)    History of tonsillectomy    Status post cataract extraction  bilateral eyes Patient is a 73 y/o male with PMH of dementia, meningioma, and seizure disorder presenting to the ED with c/o increase secretions and tachycardia to 140s. Patient alert and oriented to person, disoriented to time and place and situation, patient responds to verbal, follows commands with redirection, PERRLA, breathing unlabored, on 2L NC, airway patent, denies SOB, denies chest pain, abdomen nondistended, as per wife no n/v/d, as per wife patient has not been able to stand for the past week, as per wife patient had low grade fever at home, no chills.

## 2022-11-13 ENCOUNTER — INPATIENT (INPATIENT)
Facility: HOSPITAL | Age: 87
LOS: 8 days | Discharge: NURSING, REHAB OR LONG-TERM CARE | End: 2022-11-22
Attending: INTERNAL MEDICINE | Admitting: INTERNAL MEDICINE
Payer: MEDICARE

## 2022-11-13 VITALS
RESPIRATION RATE: 18 BRPM | HEART RATE: 73 BPM | TEMPERATURE: 98 F | DIASTOLIC BLOOD PRESSURE: 83 MMHG | SYSTOLIC BLOOD PRESSURE: 202 MMHG | OXYGEN SATURATION: 99 %

## 2022-11-13 DIAGNOSIS — Z98.890 OTHER SPECIFIED POSTPROCEDURAL STATES: Chronic | ICD-10-CM

## 2022-11-13 DIAGNOSIS — Z98.49 CATARACT EXTRACTION STATUS, UNSPECIFIED EYE: Chronic | ICD-10-CM

## 2022-11-13 DIAGNOSIS — I10 ESSENTIAL (PRIMARY) HYPERTENSION: ICD-10-CM

## 2022-11-13 DIAGNOSIS — I71.4 ABDOMINAL AORTIC ANEURYSM, WITHOUT RUPTURE: Chronic | ICD-10-CM

## 2022-11-13 DIAGNOSIS — N40.0 BENIGN PROSTATIC HYPERPLASIA WITHOUT LOWER URINARY TRACT SYMPTOMS: ICD-10-CM

## 2022-11-13 DIAGNOSIS — I48.0 PAROXYSMAL ATRIAL FIBRILLATION: ICD-10-CM

## 2022-11-13 DIAGNOSIS — R26.81 UNSTEADINESS ON FEET: ICD-10-CM

## 2022-11-13 DIAGNOSIS — N39.0 URINARY TRACT INFECTION, SITE NOT SPECIFIED: ICD-10-CM

## 2022-11-13 LAB
ALBUMIN SERPL ELPH-MCNC: 3.2 G/DL — LOW (ref 3.3–5)
ALP SERPL-CCNC: 82 U/L — SIGNIFICANT CHANGE UP (ref 40–120)
ALT FLD-CCNC: 22 U/L — SIGNIFICANT CHANGE UP (ref 12–78)
ANION GAP SERPL CALC-SCNC: 5 MMOL/L — SIGNIFICANT CHANGE UP (ref 5–17)
APPEARANCE UR: CLEAR — SIGNIFICANT CHANGE UP
AST SERPL-CCNC: 25 U/L — SIGNIFICANT CHANGE UP (ref 15–37)
BACTERIA # UR AUTO: ABNORMAL
BILIRUB SERPL-MCNC: 0.7 MG/DL — SIGNIFICANT CHANGE UP (ref 0.2–1.2)
BILIRUB UR-MCNC: NEGATIVE — SIGNIFICANT CHANGE UP
BUN SERPL-MCNC: 43 MG/DL — HIGH (ref 7–23)
CALCIUM SERPL-MCNC: 9.2 MG/DL — SIGNIFICANT CHANGE UP (ref 8.5–10.1)
CHLORIDE SERPL-SCNC: 113 MMOL/L — HIGH (ref 96–108)
CK SERPL-CCNC: 77 U/L — SIGNIFICANT CHANGE UP (ref 26–308)
CO2 SERPL-SCNC: 24 MMOL/L — SIGNIFICANT CHANGE UP (ref 22–31)
COLOR SPEC: YELLOW — SIGNIFICANT CHANGE UP
CREAT SERPL-MCNC: 2.21 MG/DL — HIGH (ref 0.5–1.3)
DIFF PNL FLD: ABNORMAL
EGFR: 28 ML/MIN/1.73M2 — LOW
FLUAV AG NPH QL: SIGNIFICANT CHANGE UP
FLUBV AG NPH QL: SIGNIFICANT CHANGE UP
GLUCOSE SERPL-MCNC: 88 MG/DL — SIGNIFICANT CHANGE UP (ref 70–99)
GLUCOSE UR QL: NEGATIVE MG/DL — SIGNIFICANT CHANGE UP
HCT VFR BLD CALC: 38.2 % — LOW (ref 39–50)
HGB BLD-MCNC: 11.9 G/DL — LOW (ref 13–17)
KETONES UR-MCNC: NEGATIVE — SIGNIFICANT CHANGE UP
LEUKOCYTE ESTERASE UR-ACNC: ABNORMAL
MCHC RBC-ENTMCNC: 28.1 PG — SIGNIFICANT CHANGE UP (ref 27–34)
MCHC RBC-ENTMCNC: 31.2 G/DL — LOW (ref 32–36)
MCV RBC AUTO: 90.1 FL — SIGNIFICANT CHANGE UP (ref 80–100)
NITRITE UR-MCNC: POSITIVE
NRBC # BLD: 0 /100 WBCS — SIGNIFICANT CHANGE UP (ref 0–0)
PH UR: 6.5 — SIGNIFICANT CHANGE UP (ref 5–8)
PLATELET # BLD AUTO: 165 K/UL — SIGNIFICANT CHANGE UP (ref 150–400)
POTASSIUM SERPL-MCNC: 5.1 MMOL/L — SIGNIFICANT CHANGE UP (ref 3.5–5.3)
POTASSIUM SERPL-SCNC: 5.1 MMOL/L — SIGNIFICANT CHANGE UP (ref 3.5–5.3)
PROT SERPL-MCNC: 7.3 GM/DL — SIGNIFICANT CHANGE UP (ref 6–8.3)
PROT UR-MCNC: 100 MG/DL
RBC # BLD: 4.24 M/UL — SIGNIFICANT CHANGE UP (ref 4.2–5.8)
RBC # FLD: 15.3 % — HIGH (ref 10.3–14.5)
RBC CASTS # UR COMP ASSIST: >50 /HPF (ref 0–4)
SARS-COV-2 RNA SPEC QL NAA+PROBE: SIGNIFICANT CHANGE UP
SODIUM SERPL-SCNC: 142 MMOL/L — SIGNIFICANT CHANGE UP (ref 135–145)
SP GR SPEC: 1.01 — SIGNIFICANT CHANGE UP (ref 1.01–1.02)
TROPONIN I, HIGH SENSITIVITY RESULT: 699.6 NG/L — HIGH
TSH SERPL-MCNC: 0.83 UIU/ML — SIGNIFICANT CHANGE UP (ref 0.36–3.74)
UROBILINOGEN FLD QL: NEGATIVE MG/DL — SIGNIFICANT CHANGE UP
WBC # BLD: 6.7 K/UL — SIGNIFICANT CHANGE UP (ref 3.8–10.5)
WBC # FLD AUTO: 6.7 K/UL — SIGNIFICANT CHANGE UP (ref 3.8–10.5)
WBC UR QL: >50

## 2022-11-13 PROCEDURE — 93010 ELECTROCARDIOGRAM REPORT: CPT

## 2022-11-13 PROCEDURE — 99285 EMERGENCY DEPT VISIT HI MDM: CPT

## 2022-11-13 PROCEDURE — 71045 X-RAY EXAM CHEST 1 VIEW: CPT | Mod: 26

## 2022-11-13 RX ORDER — HEPARIN SODIUM 5000 [USP'U]/ML
5000 INJECTION INTRAVENOUS; SUBCUTANEOUS EVERY 12 HOURS
Refills: 0 | Status: DISCONTINUED | OUTPATIENT
Start: 2022-11-13 | End: 2022-11-15

## 2022-11-13 RX ORDER — CEFTRIAXONE 500 MG/1
1000 INJECTION, POWDER, FOR SOLUTION INTRAMUSCULAR; INTRAVENOUS ONCE
Refills: 0 | Status: COMPLETED | OUTPATIENT
Start: 2022-11-13 | End: 2022-11-13

## 2022-11-13 RX ORDER — HYDRALAZINE HCL 50 MG
25 TABLET ORAL ONCE
Refills: 0 | Status: COMPLETED | OUTPATIENT
Start: 2022-11-13 | End: 2022-11-13

## 2022-11-13 RX ORDER — METOPROLOL TARTRATE 50 MG
25 TABLET ORAL EVERY 6 HOURS
Refills: 0 | Status: DISCONTINUED | OUTPATIENT
Start: 2022-11-13 | End: 2022-11-22

## 2022-11-13 RX ORDER — HYDRALAZINE HCL 50 MG
10 TABLET ORAL ONCE
Refills: 0 | Status: COMPLETED | OUTPATIENT
Start: 2022-11-13 | End: 2022-11-13

## 2022-11-13 RX ORDER — ASPIRIN/CALCIUM CARB/MAGNESIUM 324 MG
81 TABLET ORAL DAILY
Refills: 0 | Status: DISCONTINUED | OUTPATIENT
Start: 2022-11-13 | End: 2022-11-22

## 2022-11-13 RX ORDER — PANTOPRAZOLE SODIUM 20 MG/1
40 TABLET, DELAYED RELEASE ORAL
Refills: 0 | Status: DISCONTINUED | OUTPATIENT
Start: 2022-11-13 | End: 2022-11-22

## 2022-11-13 RX ORDER — ATORVASTATIN CALCIUM 80 MG/1
80 TABLET, FILM COATED ORAL AT BEDTIME
Refills: 0 | Status: DISCONTINUED | OUTPATIENT
Start: 2022-11-13 | End: 2022-11-22

## 2022-11-13 RX ORDER — ACETAMINOPHEN 500 MG
650 TABLET ORAL EVERY 6 HOURS
Refills: 0 | Status: DISCONTINUED | OUTPATIENT
Start: 2022-11-13 | End: 2022-11-22

## 2022-11-13 RX ORDER — DOXAZOSIN MESYLATE 4 MG
4 TABLET ORAL DAILY
Refills: 0 | Status: DISCONTINUED | OUTPATIENT
Start: 2022-11-13 | End: 2022-11-22

## 2022-11-13 RX ORDER — SENNA PLUS 8.6 MG/1
2 TABLET ORAL AT BEDTIME
Refills: 0 | Status: DISCONTINUED | OUTPATIENT
Start: 2022-11-13 | End: 2022-11-22

## 2022-11-13 RX ORDER — AMLODIPINE BESYLATE 2.5 MG/1
10 TABLET ORAL DAILY
Refills: 0 | Status: DISCONTINUED | OUTPATIENT
Start: 2022-11-13 | End: 2022-11-22

## 2022-11-13 RX ORDER — AMIODARONE HYDROCHLORIDE 400 MG/1
200 TABLET ORAL DAILY
Refills: 0 | Status: DISCONTINUED | OUTPATIENT
Start: 2022-11-13 | End: 2022-11-22

## 2022-11-13 RX ADMIN — PANTOPRAZOLE SODIUM 40 MILLIGRAM(S): 20 TABLET, DELAYED RELEASE ORAL at 17:17

## 2022-11-13 RX ADMIN — AMIODARONE HYDROCHLORIDE 200 MILLIGRAM(S): 400 TABLET ORAL at 17:17

## 2022-11-13 RX ADMIN — Medication 25 MILLIGRAM(S): at 10:52

## 2022-11-13 RX ADMIN — ATORVASTATIN CALCIUM 80 MILLIGRAM(S): 80 TABLET, FILM COATED ORAL at 21:33

## 2022-11-13 RX ADMIN — HEPARIN SODIUM 5000 UNIT(S): 5000 INJECTION INTRAVENOUS; SUBCUTANEOUS at 17:17

## 2022-11-13 RX ADMIN — Medication 81 MILLIGRAM(S): at 17:18

## 2022-11-13 RX ADMIN — Medication 4 MILLIGRAM(S): at 17:20

## 2022-11-13 RX ADMIN — Medication 10 MILLIGRAM(S): at 12:33

## 2022-11-13 RX ADMIN — Medication 25 MILLIGRAM(S): at 17:17

## 2022-11-13 RX ADMIN — CEFTRIAXONE 100 MILLIGRAM(S): 500 INJECTION, POWDER, FOR SOLUTION INTRAMUSCULAR; INTRAVENOUS at 15:20

## 2022-11-13 RX ADMIN — AMLODIPINE BESYLATE 10 MILLIGRAM(S): 2.5 TABLET ORAL at 17:18

## 2022-11-13 NOTE — ED PROVIDER NOTE - CLINICAL SUMMARY MEDICAL DECISION MAKING FREE TEXT BOX
Patient with urinary frequency. Suprapubic tenderness on exam. Will check UA to r/u UTI. Very hypertensive in ER. Unsure of his compliance with medication. Will check EKG. Check labs for signs of end organ damage. Give medication. Continue to monitor for blood pressure. Reevaluate.

## 2022-11-13 NOTE — H&P ADULT - NSICDXPASTSURGICALHX_GEN_ALL_CORE_FT
PAST SURGICAL HISTORY:  AAA (abdominal aortic aneurysm)     History of tonsillectomy     Status post cataract extraction bilateral eyes

## 2022-11-13 NOTE — H&P ADULT - NSHPPHYSICALEXAM_GEN_ALL_CORE
PHYSICAL EXAM:    GENERAL: NAD, well-groomed, well-developed  HEAD:  Atraumatic, Normocephalic  EYES: EOMI, PERRLA, conjunctiva and sclera clear  ENMT: No tonsillar erythema, exudates, or enlargement; Moist mucous membranes, , No lesions  NECK: Supple, No JVD, Normal thyroid  NERVOUS SYSTEM:  Alert & Oriented X 3, no  focal deficits  CHEST/LUNG: Clear  bilaterally; No rales, rhonchi, wheezing, or rubs  HEART: Regular rate and rhythm; No murmurs, rubs, or gallops  ABDOMEN: Soft, Nontender, Nondistended; no  masses Bowel sounds present  EXTREMITIES:  + Peripheral Pulses, No clubbing, cyanosis, or edema  LYMPH: No lymphadenopathy noted   RECTAL: deferred  SKIN: No rashes or lesions

## 2022-11-13 NOTE — H&P ADULT - NSICDXPASTMEDICALHX_GEN_ALL_CORE_FT
PAST MEDICAL HISTORY:  Benign prostate hyperplasia     Hemorrhoids     Hypercholesterolemia     Hypertension     MI (myocardial infarction)     PAF (paroxysmal atrial fibrillation)     S/P CABG (coronary artery bypass graft)

## 2022-11-13 NOTE — H&P ADULT - NSHPLABSRESULTS_GEN_ALL_CORE
LABS:                        11.9   6.70  )-----------( 165      ( 2022 12:30 )             38.2     11-13    142  |  113<H>  |  43<H>  ----------------------------<  88  5.1   |  24  |  2.21<H>    Ca    9.2      2022 11:15    TPro  7.3  /  Alb  3.2<L>  /  TBili  0.7  /  DBili  x   /  AST  25  /  ALT  22  /  AlkPhos  82  11-13      Urinalysis Basic - ( 2022 12:00 )    Color: Yellow / Appearance: Clear / S.010 / pH: x  Gluc: x / Ketone: Negative  / Bili: Negative / Urobili: Negative mg/dL   Blood: x / Protein: 100 mg/dL / Nitrite: Positive   Leuk Esterase: Moderate / RBC: >50 /HPF / WBC >50   Sq Epi: x / Non Sq Epi: x / Bacteria: Many

## 2022-11-13 NOTE — H&P ADULT - HISTORY OF PRESENT ILLNESS
89 y/o M with a PMHx of HTN, HLD, hemorrhoids AAA repair 10 years ago  benign prostate hyperplasia, urinary incontinence, and MI (2018) presents to the ED due worsening urinary incontinence. Patient states that he has had urinary incontinence for over 2 years. As per patient, there has been an increase in urinary frequency since early this morning. Patient reports that he has to urinate every 15-20 minutes. Denies any fever, hematuria, abdominal pain, vomiting.chest pain, SOB, or dizziness. Patient is unsure of the blood pressure medication or other medications that he takes.

## 2022-11-13 NOTE — ED ADULT NURSE REASSESSMENT NOTE - NS ED NURSE REASSESS COMMENT FT1
Pt resting in stretcher watching TV, no signs of distress. BP elevated 202/86, MD Pugh aware. Denies headache, chest pain, shortness of breath. On cardiac monitor and .

## 2022-11-13 NOTE — ED PROVIDER NOTE - CARE PLAN
1 Principal Discharge DX:	UTI (urinary tract infection)  Secondary Diagnosis:	Generalized weakness  Secondary Diagnosis:	HTN (hypertension)  Secondary Diagnosis:	NSTEMI (non-ST elevation myocardial infarction)

## 2022-11-13 NOTE — ED ADULT NURSE NOTE - OBJECTIVE STATEMENT
Pt alert and oriented x4, without any distress. Pt is endorsing urinary urgency and frequency with dysuria x1 day. Pt BP elevated on arrival. Denies chest pain, SOB, N/V/D, recent fevers, back pain, abd pain.

## 2022-11-13 NOTE — ED ADULT NURSE REASSESSMENT NOTE - NS ED NURSE REASSESS COMMENT FT1
Upon examination of skin, pt noted to have stage 1 pressure injury on the sacrum. Repositioned with pillows every 2 hours, skin kept clean and dry.

## 2022-11-13 NOTE — ED ADULT TRIAGE NOTE - PAIN RATING/NUMBER SCALE (0-10): ACTIVITY
Patient calling in stating that she now has some nasal congestion and headache and sinus pressure.     Advised that she will have to call her PCP's office to discuss.       Patient returned call and states that her PCP's office suggested for her to take some mucinex and to get a COVID nasal swab.     Patient had COVID nasal swab completed today( 12/17/2020)- awaiting results.   Patient will keep us updated on the results.   
Patient test that was completed on 12/17/2020 = negative.         Patient underwent her Covid nasal swab today  for her procedure on 12/23/2020.    
0

## 2022-11-13 NOTE — ED ADULT NURSE NOTE - NSIMPLEMENTINTERV_GEN_ALL_ED
Implemented All Fall with Harm Risk Interventions:  [place] to call system. Call bell, personal items and telephone within reach. Instruct patient to call for assistance. Room bathroom lighting operational. Non-slip footwear when patient is off stretcher. Physically safe environment: no spills, clutter or unnecessary equipment. Stretcher in lowest position, wheels locked, appropriate side rails in place. Provide visual cue, wrist band, yellow gown, etc. Monitor gait and stability. Monitor for mental status changes and reorient to person, place, and time. Review medications for side effects contributing to fall risk. Reinforce activity limits and safety measures with patient and family. Provide visual clues: red socks.

## 2022-11-13 NOTE — ED PROVIDER NOTE - OBJECTIVE STATEMENT
89 y/o M with a PMHx of HTN, HLD, hemorrhoids, benign prostate hyperplasia, urinary incontinence, and MI (2018) presents to the ED due to chronic urinary incontinence. Patient states that he has had urinary incontinence for over 2 years. As per patient, there has been an increase in urinary frequency since early this morning. Patient reports that he has to urinate every 15-20 minutes. Denies any fever, hematuria, abdominal pain, or vomiting. Denies any chest pain, SOB, or dizziness. Patient is unsure of the blood pressure medication or other medications that he takes.

## 2022-11-14 LAB
ALBUMIN SERPL ELPH-MCNC: 3 G/DL — LOW (ref 3.3–5)
ALP SERPL-CCNC: 86 U/L — SIGNIFICANT CHANGE UP (ref 40–120)
ALT FLD-CCNC: 16 U/L — SIGNIFICANT CHANGE UP (ref 12–78)
ANION GAP SERPL CALC-SCNC: 5 MMOL/L — SIGNIFICANT CHANGE UP (ref 5–17)
AST SERPL-CCNC: 16 U/L — SIGNIFICANT CHANGE UP (ref 15–37)
BILIRUB SERPL-MCNC: 0.9 MG/DL — SIGNIFICANT CHANGE UP (ref 0.2–1.2)
BUN SERPL-MCNC: 40 MG/DL — HIGH (ref 7–23)
CALCIUM SERPL-MCNC: 9.3 MG/DL — SIGNIFICANT CHANGE UP (ref 8.5–10.1)
CHLORIDE SERPL-SCNC: 110 MMOL/L — HIGH (ref 96–108)
CO2 SERPL-SCNC: 25 MMOL/L — SIGNIFICANT CHANGE UP (ref 22–31)
CREAT SERPL-MCNC: 2.07 MG/DL — HIGH (ref 0.5–1.3)
CRP SERPL-MCNC: 15 MG/L — HIGH
EGFR: 30 ML/MIN/1.73M2 — LOW
GLUCOSE SERPL-MCNC: 81 MG/DL — SIGNIFICANT CHANGE UP (ref 70–99)
HCT VFR BLD CALC: 40.8 % — SIGNIFICANT CHANGE UP (ref 39–50)
HGB BLD-MCNC: 12.5 G/DL — LOW (ref 13–17)
MCHC RBC-ENTMCNC: 27.7 PG — SIGNIFICANT CHANGE UP (ref 27–34)
MCHC RBC-ENTMCNC: 30.6 G/DL — LOW (ref 32–36)
MCV RBC AUTO: 90.3 FL — SIGNIFICANT CHANGE UP (ref 80–100)
NRBC # BLD: 0 /100 WBCS — SIGNIFICANT CHANGE UP (ref 0–0)
PLATELET # BLD AUTO: 181 K/UL — SIGNIFICANT CHANGE UP (ref 150–400)
POTASSIUM SERPL-MCNC: 4.7 MMOL/L — SIGNIFICANT CHANGE UP (ref 3.5–5.3)
POTASSIUM SERPL-SCNC: 4.7 MMOL/L — SIGNIFICANT CHANGE UP (ref 3.5–5.3)
PROT SERPL-MCNC: 7.4 GM/DL — SIGNIFICANT CHANGE UP (ref 6–8.3)
RBC # BLD: 4.52 M/UL — SIGNIFICANT CHANGE UP (ref 4.2–5.8)
RBC # FLD: 15.2 % — HIGH (ref 10.3–14.5)
SODIUM SERPL-SCNC: 140 MMOL/L — SIGNIFICANT CHANGE UP (ref 135–145)
T3 SERPL-MCNC: 76 NG/DL — LOW (ref 80–200)
T4 AB SER-ACNC: 7.4 UG/DL — SIGNIFICANT CHANGE UP (ref 4.6–12)
TROPONIN I, HIGH SENSITIVITY RESULT: 281.6 NG/L — HIGH
WBC # BLD: 8.93 K/UL — SIGNIFICANT CHANGE UP (ref 3.8–10.5)
WBC # FLD AUTO: 8.93 K/UL — SIGNIFICANT CHANGE UP (ref 3.8–10.5)

## 2022-11-14 RX ORDER — CEFTRIAXONE 500 MG/1
1000 INJECTION, POWDER, FOR SOLUTION INTRAMUSCULAR; INTRAVENOUS EVERY 24 HOURS
Refills: 0 | Status: COMPLETED | OUTPATIENT
Start: 2022-11-14 | End: 2022-11-16

## 2022-11-14 RX ORDER — LANOLIN ALCOHOL/MO/W.PET/CERES
3 CREAM (GRAM) TOPICAL AT BEDTIME
Refills: 0 | Status: DISCONTINUED | OUTPATIENT
Start: 2022-11-14 | End: 2022-11-22

## 2022-11-14 RX ADMIN — Medication 25 MILLIGRAM(S): at 05:05

## 2022-11-14 RX ADMIN — Medication 25 MILLIGRAM(S): at 17:47

## 2022-11-14 RX ADMIN — HEPARIN SODIUM 5000 UNIT(S): 5000 INJECTION INTRAVENOUS; SUBCUTANEOUS at 05:09

## 2022-11-14 RX ADMIN — CEFTRIAXONE 100 MILLIGRAM(S): 500 INJECTION, POWDER, FOR SOLUTION INTRAMUSCULAR; INTRAVENOUS at 16:10

## 2022-11-14 RX ADMIN — PANTOPRAZOLE SODIUM 40 MILLIGRAM(S): 20 TABLET, DELAYED RELEASE ORAL at 05:05

## 2022-11-14 RX ADMIN — AMIODARONE HYDROCHLORIDE 200 MILLIGRAM(S): 400 TABLET ORAL at 05:05

## 2022-11-14 RX ADMIN — Medication 81 MILLIGRAM(S): at 11:22

## 2022-11-14 RX ADMIN — Medication 4 MILLIGRAM(S): at 05:04

## 2022-11-14 RX ADMIN — AMLODIPINE BESYLATE 10 MILLIGRAM(S): 2.5 TABLET ORAL at 05:05

## 2022-11-14 RX ADMIN — SENNA PLUS 2 TABLET(S): 8.6 TABLET ORAL at 22:27

## 2022-11-14 RX ADMIN — ATORVASTATIN CALCIUM 80 MILLIGRAM(S): 80 TABLET, FILM COATED ORAL at 22:26

## 2022-11-14 RX ADMIN — Medication 3 MILLIGRAM(S): at 22:26

## 2022-11-14 NOTE — PATIENT PROFILE ADULT - FALL HARM RISK - HARM RISK INTERVENTIONS
Assistance with ambulation/Assistance OOB with selected safe patient handling equipment/Communicate Risk of Fall with Harm to all staff/Discuss with provider need for PT consult/Monitor for mental status changes/Monitor gait and stability/Move patient closer to nurses' station/Provide patient with walking aids - walker, cane, crutches/Reinforce activity limits and safety measures with patient and family/Reorient to person, place and time as needed/Tailored Fall Risk Interventions/Toileting schedule using arm’s reach rule for commode and bathroom/Use of alarms - bed, chair and/or voice tab/Visual Cue: Yellow wristband and red socks/Bed in lowest position, wheels locked, appropriate side rails in place/Call bell, personal items and telephone in reach/Instruct patient to call for assistance before getting out of bed or chair/Non-slip footwear when patient is out of bed/[place] to call system/Physically safe environment - no spills, clutter or unnecessary equipment/Purposeful Proactive Rounding/Room/bathroom lighting operational, light cord in reach

## 2022-11-14 NOTE — CONSULT NOTE ADULT - SUBJECTIVE AND OBJECTIVE BOX
NEPHROLOGY CONSULTATION    CHIEF COMPLAINT:  CKD      HPI:  89 y/o M with a PMHx of HTN, HLD, hemorrhoids AAA repair 10 years ago  benign prostate hyperplasia, urinary incontinence, and MI (2018) presents to the ED due worsening urinary incontinence. Patient states that he has had urinary incontinence for over 2 years. As per patient, there has been an increase in urinary frequency since early this morning. Patient reports that he has to urinate every 15-20 minutes. Denies any fever, hematuria, abdominal pain, vomiting.chest pain, SOB, or dizziness. Patient is unsure of the blood pressure medication or other medications that he takes.  His Cr is in low 2's with no known baseline.  Patient unsure if he has CKD.       PAST MEDICAL & SURGICAL HISTORY:  Hypertension      Benign prostate hyperplasia      Hypercholesterolemia      Hemorrhoids      MI (myocardial infarction)      S/P CABG (coronary artery bypass graft)      PAF (paroxysmal atrial fibrillation)      AAA (abdominal aortic aneurysm)      Status post cataract extraction  bilateral eyes      History of tonsillectomy          SOCIAL HISTORY:  NA    FAMILY HISTORY:  NA      MEDICATIONS  (STANDING):  aMIOdarone    Tablet 200 milliGRAM(s) Oral daily  amLODIPine   Tablet 10 milliGRAM(s) Oral daily  aspirin enteric coated 81 milliGRAM(s) Oral daily  atorvastatin 80 milliGRAM(s) Oral at bedtime  cefTRIAXone   IVPB 1000 milliGRAM(s) IV Intermittent every 24 hours  doxazosin 4 milliGRAM(s) Oral daily  heparin   Injectable 5000 Unit(s) SubCutaneous every 12 hours  metoprolol tartrate 25 milliGRAM(s) Oral every 6 hours  pantoprazole    Tablet 40 milliGRAM(s) Oral before breakfast  senna 2 Tablet(s) Oral at bedtime      PHYSICAL EXAMINATION:  T(F): 97.8 (22 @ 15:24)  HR: 64 (22 @ 15:24)  BP: 143/70 (22 @ 15:24)  RR: 19 (22 @ 15:24)  SpO2: 100% (22 @ 15:24)  Conversant, no apparent distress  PERRLA, pink conjunctivae, no ptosis  Good dentition, no pharyngeal erythema  Neck non tender, no mass, no thyromegaly or nodules  Normal respiratory effort, lungs clear to auscultation  Heart with RRR, no murmurs or rubs, no peripheral edema  Abdomen soft, no masses, no organomegaly  Skin no rashes, ulcers or lesions, normal turgor and temperature  Appropriate affect, AO x 3    LABS:                        12.5   8.93  )-----------( 181      ( 2022 06:12 )             40.8     11-    140  |  110<H>  |  40<H>  ----------------------------<  81  4.7   |  25  |  2.07<H>    Ca    9.3      2022 06:12    TPro  7.4  /  Alb  3.0<L>  /  TBili  0.9  /  DBili  x   /  AST  16  /  ALT  16  /  AlkPhos  86  -14    Urinalysis Basic - ( 2022 12:00 )  Color: Yellow / Appearance: Clear / S.010 / pH: x  Gluc: x / Ketone: Negative  / Bili: Negative / Urobili: Negative mg/dL   Blood: x / Protein: 100 mg/dL / Nitrite: Positive   Leuk Esterase: Moderate / RBC: >50 /HPF / WBC >50   Sq Epi: x / Non Sq Epi: x / Bacteria: Many    RADIOLOGY:  CXR shows NAPD      ASSESSMENT:  1.  Azotemia - not clear if this is acute or chronic  2.  E. Coli UTI    PLAN:  Antibiotics as per medicine  Check renal, bladder ultrasound

## 2022-11-14 NOTE — PATIENT PROFILE ADULT - FUNCTIONAL ASSESSMENT - BASIC MOBILITY 6.
3-calculated by average/Not able to assess (calculate score using [facility] averaging method)

## 2022-11-15 LAB
-  AMIKACIN: SIGNIFICANT CHANGE UP
-  AMOXICILLIN/CLAVULANIC ACID: SIGNIFICANT CHANGE UP
-  AMPICILLIN/SULBACTAM: SIGNIFICANT CHANGE UP
-  AMPICILLIN: SIGNIFICANT CHANGE UP
-  AZTREONAM: SIGNIFICANT CHANGE UP
-  CEFAZOLIN: SIGNIFICANT CHANGE UP
-  CEFEPIME: SIGNIFICANT CHANGE UP
-  CEFOXITIN: SIGNIFICANT CHANGE UP
-  CEFTRIAXONE: SIGNIFICANT CHANGE UP
-  CIPROFLOXACIN: SIGNIFICANT CHANGE UP
-  ERTAPENEM: SIGNIFICANT CHANGE UP
-  GENTAMICIN: SIGNIFICANT CHANGE UP
-  IMIPENEM: SIGNIFICANT CHANGE UP
-  LEVOFLOXACIN: SIGNIFICANT CHANGE UP
-  MEROPENEM: SIGNIFICANT CHANGE UP
-  NITROFURANTOIN: SIGNIFICANT CHANGE UP
-  PIPERACILLIN/TAZOBACTAM: SIGNIFICANT CHANGE UP
-  TOBRAMYCIN: SIGNIFICANT CHANGE UP
-  TRIMETHOPRIM/SULFAMETHOXAZOLE: SIGNIFICANT CHANGE UP
ALBUMIN SERPL ELPH-MCNC: 2.8 G/DL — LOW (ref 3.3–5)
ALP SERPL-CCNC: 79 U/L — SIGNIFICANT CHANGE UP (ref 40–120)
ALT FLD-CCNC: 14 U/L — SIGNIFICANT CHANGE UP (ref 12–78)
ANION GAP SERPL CALC-SCNC: 8 MMOL/L — SIGNIFICANT CHANGE UP (ref 5–17)
AST SERPL-CCNC: 13 U/L — LOW (ref 15–37)
BILIRUB SERPL-MCNC: 0.7 MG/DL — SIGNIFICANT CHANGE UP (ref 0.2–1.2)
BUN SERPL-MCNC: 45 MG/DL — HIGH (ref 7–23)
CALCIUM SERPL-MCNC: 9 MG/DL — SIGNIFICANT CHANGE UP (ref 8.5–10.1)
CHLORIDE SERPL-SCNC: 112 MMOL/L — HIGH (ref 96–108)
CO2 SERPL-SCNC: 22 MMOL/L — SIGNIFICANT CHANGE UP (ref 22–31)
CREAT SERPL-MCNC: 2.35 MG/DL — HIGH (ref 0.5–1.3)
CRP SERPL-MCNC: 38 MG/L — HIGH
CULTURE RESULTS: SIGNIFICANT CHANGE UP
EGFR: 26 ML/MIN/1.73M2 — LOW
GLUCOSE SERPL-MCNC: 99 MG/DL — SIGNIFICANT CHANGE UP (ref 70–99)
HCT VFR BLD CALC: 37.2 % — LOW (ref 39–50)
HGB BLD-MCNC: 12 G/DL — LOW (ref 13–17)
MCHC RBC-ENTMCNC: 28.1 PG — SIGNIFICANT CHANGE UP (ref 27–34)
MCHC RBC-ENTMCNC: 32.3 G/DL — SIGNIFICANT CHANGE UP (ref 32–36)
MCV RBC AUTO: 87.1 FL — SIGNIFICANT CHANGE UP (ref 80–100)
METHOD TYPE: SIGNIFICANT CHANGE UP
NRBC # BLD: 0 /100 WBCS — SIGNIFICANT CHANGE UP (ref 0–0)
ORGANISM # SPEC MICROSCOPIC CNT: SIGNIFICANT CHANGE UP
ORGANISM # SPEC MICROSCOPIC CNT: SIGNIFICANT CHANGE UP
PLATELET # BLD AUTO: 203 K/UL — SIGNIFICANT CHANGE UP (ref 150–400)
POTASSIUM SERPL-MCNC: 4 MMOL/L — SIGNIFICANT CHANGE UP (ref 3.5–5.3)
POTASSIUM SERPL-SCNC: 4 MMOL/L — SIGNIFICANT CHANGE UP (ref 3.5–5.3)
PROT SERPL-MCNC: 6.8 GM/DL — SIGNIFICANT CHANGE UP (ref 6–8.3)
RBC # BLD: 4.27 M/UL — SIGNIFICANT CHANGE UP (ref 4.2–5.8)
RBC # FLD: 15 % — HIGH (ref 10.3–14.5)
SODIUM SERPL-SCNC: 142 MMOL/L — SIGNIFICANT CHANGE UP (ref 135–145)
SPECIMEN SOURCE: SIGNIFICANT CHANGE UP
TROPONIN I, HIGH SENSITIVITY RESULT: 166 NG/L — HIGH
WBC # BLD: 9.26 K/UL — SIGNIFICANT CHANGE UP (ref 3.8–10.5)
WBC # FLD AUTO: 9.26 K/UL — SIGNIFICANT CHANGE UP (ref 3.8–10.5)

## 2022-11-15 PROCEDURE — 76770 US EXAM ABDO BACK WALL COMP: CPT | Mod: 26

## 2022-11-15 PROCEDURE — 99222 1ST HOSP IP/OBS MODERATE 55: CPT

## 2022-11-15 RX ORDER — HALOPERIDOL DECANOATE 100 MG/ML
1 INJECTION INTRAMUSCULAR ONCE
Refills: 0 | Status: COMPLETED | OUTPATIENT
Start: 2022-11-15 | End: 2022-11-15

## 2022-11-15 RX ORDER — OLANZAPINE 15 MG/1
5 TABLET, FILM COATED ORAL ONCE
Refills: 0 | Status: COMPLETED | OUTPATIENT
Start: 2022-11-15 | End: 2022-11-15

## 2022-11-15 RX ORDER — HEPARIN SODIUM 5000 [USP'U]/ML
5000 INJECTION INTRAVENOUS; SUBCUTANEOUS EVERY 8 HOURS
Refills: 0 | Status: DISCONTINUED | OUTPATIENT
Start: 2022-11-15 | End: 2022-11-22

## 2022-11-15 RX ADMIN — HEPARIN SODIUM 5000 UNIT(S): 5000 INJECTION INTRAVENOUS; SUBCUTANEOUS at 12:08

## 2022-11-15 RX ADMIN — Medication 25 MILLIGRAM(S): at 05:09

## 2022-11-15 RX ADMIN — AMLODIPINE BESYLATE 10 MILLIGRAM(S): 2.5 TABLET ORAL at 05:09

## 2022-11-15 RX ADMIN — AMIODARONE HYDROCHLORIDE 200 MILLIGRAM(S): 400 TABLET ORAL at 05:08

## 2022-11-15 RX ADMIN — Medication 81 MILLIGRAM(S): at 12:01

## 2022-11-15 RX ADMIN — HEPARIN SODIUM 5000 UNIT(S): 5000 INJECTION INTRAVENOUS; SUBCUTANEOUS at 21:34

## 2022-11-15 RX ADMIN — SENNA PLUS 2 TABLET(S): 8.6 TABLET ORAL at 21:34

## 2022-11-15 RX ADMIN — HEPARIN SODIUM 5000 UNIT(S): 5000 INJECTION INTRAVENOUS; SUBCUTANEOUS at 05:08

## 2022-11-15 RX ADMIN — CEFTRIAXONE 100 MILLIGRAM(S): 500 INJECTION, POWDER, FOR SOLUTION INTRAMUSCULAR; INTRAVENOUS at 17:20

## 2022-11-15 RX ADMIN — Medication 3 MILLIGRAM(S): at 21:34

## 2022-11-15 RX ADMIN — PANTOPRAZOLE SODIUM 40 MILLIGRAM(S): 20 TABLET, DELAYED RELEASE ORAL at 05:08

## 2022-11-15 RX ADMIN — HALOPERIDOL DECANOATE 1 MILLIGRAM(S): 100 INJECTION INTRAMUSCULAR at 05:04

## 2022-11-15 RX ADMIN — OLANZAPINE 5 MILLIGRAM(S): 15 TABLET, FILM COATED ORAL at 01:48

## 2022-11-15 RX ADMIN — Medication 4 MILLIGRAM(S): at 05:54

## 2022-11-15 RX ADMIN — ATORVASTATIN CALCIUM 80 MILLIGRAM(S): 80 TABLET, FILM COATED ORAL at 21:34

## 2022-11-15 NOTE — CONSULT NOTE ADULT - NS ATTEND AMEND GEN_ALL_CORE FT
agree with above plan  emptying well on his alpha blocker  nothing to do right now  no acute  issue  f/u out pt and reconsult if needed

## 2022-11-15 NOTE — PHYSICAL THERAPY INITIAL EVALUATION ADULT - PERTINENT HX OF CURRENT PROBLEM, REHAB EVAL
Pre-Operative H & P     CC:  Preoperative exam to assess for increased cardiopulmonary risk while undergoing surgery and anesthesia.    Date of Encounter: 4/15/2022  Primary Care Physician:  Johnson Chowdhury     Reason for visit:   Encounter Diagnoses   Name Primary?     Preop examination Yes     Pancreatic cyst        HPI  Junior Rodriguez is a 41 year old male who presents for pre-operative H & P in preparation for  Procedure Information     Case: [id number] Date/Time: 04/19/22 1000    Procedure: ENDOSCOPIC ULTRASOUND, ESOPHAGOSCOPY / UPPER GASTROINTESTINAL TRACT (GI) (N/A Esophagus)    Anesthesia type: Monitor Anesthesia Care    Diagnosis: Pancreatic cyst [K86.2]    Pre-op diagnosis: Pancreatic cyst [K86.2]    Location:  GI 02 /  GI    Providers: Guru Kirt Rosenbaum MD        History is obtained from the patient and chart review    Patient who has been following with primary provider off and on for abdominal pain. A CT scan was completed as part of work up with incidental finding of 2.5 cm pancreatic cyst. He was referred to GI and above procedure recommended.     His history is otherwise significant for 30 pack years smoking.    Hx of abnormal bleeding or anti-platelet use: Denies.       Past Medical History  Past Medical History:   Diagnosis Date     Epididymitis      Multiple joint pain      Pancreatic cyst      Tobacco use        Past Surgical History  Past Surgical History:   Procedure Laterality Date     DENERVATION OF SPERMATIC CORD MICROSURGICAL Left 4/6/2015    Procedure: DENERVATION OF SPERMATIC CORD MICROSURGICAL;  Surgeon: Kiko Morgan MD;  Location: [facility] OR     EPIDIDYMECTOMY  8/2014     VASECTOMY  2/2013       Prior to Admission Medications  Current Outpatient Medications   Medication Sig Dispense Refill     cyclobenzaprine (FLEXERIL) 5 MG tablet Take 1 tablet (5 mg) by mouth 3 times daily as needed for muscle spasms 30 tablet 0     ibuprofen (ADVIL/MOTRIN) 800 MG tablet Take 1  tablet (800 mg) by mouth every 8 hours as needed for moderate pain 180 tablet 3       Allergies  No Known Allergies    Social History  Social History     Socioeconomic History     Marital status:      Spouse name: Not on file     Number of children: Not on file     Years of education: Not on file     Highest education level: Not on file   Occupational History     Not on file   Tobacco Use     Smoking status: Current Every Day Smoker     Packs/day: 1.00     Years: 30.00     Pack years: 30.00     Types: Cigarettes     Smokeless tobacco: Never Used     Tobacco comment: Plans to quit in future   Vaping Use     Vaping Use: Never used   Substance and Sexual Activity     Alcohol use: No     Drug use: No     Sexual activity: Yes     Partners: Female     Birth control/protection: Surgical   Other Topics Concern     Parent/sibling w/ CABG, MI or angioplasty before 65F 55M? Yes     Comment: uncle at age 29   Social History Narrative     Not on file     Social Determinants of Health     Financial Resource Strain: Not on file   Food Insecurity: Not on file   Transportation Needs: Not on file   Physical Activity: Not on file   Stress: Not on file   Social Connections: Not on file   Intimate Partner Violence: Not on file   Housing Stability: Not on file       Family History  Family History   Problem Relation Age of Onset     Neurologic Disorder Mother         MS     Cancer Mother         lung     Chronic Obstructive Pulmonary Disease Maternal Grandmother      Alzheimer Disease Paternal Grandmother      Prostate Cancer No family hx of      Cancer - colorectal No family hx of        Review of Systems  The complete review of systems is negative other than noted in the HPI or here.   Anesthesia Evaluation   Pt has had prior anesthetic. Type: General and MAC.    No history of anesthetic complications       ROS/MED HX  ENT/Pulmonary:     (+) ESTEBAN risk factors, snores loudly, observed stopped breathing, tobacco use, Current use, 1  "packs/day, 30  Pack-Year Hx,   (-) recent URI   Neurologic:  - neg neurologic ROS     Cardiovascular:     (+) -----No previous cardiac testing  (-) taking anticoagulants/antiplatelets   METS/Exercise Tolerance: >4 METS    Hematologic:  - neg hematologic  ROS     Musculoskeletal:  - neg musculoskeletal ROS     GI/Hepatic: Comment: Pancreas cyst  Abd pain   (-) GERD   Renal/Genitourinary:  - neg Renal ROS     Endo:  - neg endo ROS     Psychiatric/Substance Use:  - neg psychiatric ROS     Infectious Disease:  - neg infectious disease ROS     Malignancy:  - neg malignancy ROS     Other:  - neg other ROS          BP (!) 137/90 (BP Location: Right arm, Patient Position: Sitting, Cuff Size: Adult Regular)   Pulse 62   Temp 98  F (36.7  C) (Oral)   Resp 16   Ht 1.81 m (5' 11.25\")   Wt 99.5 kg (219 lb 4.8 oz)   SpO2 98%   BMI 30.37 kg/m      Physical Exam   Constitutional: Awake, alert, cooperative, no apparent distress, and appears stated age.  Eyes: Pupils equal, round and reactive to light, extra ocular muscles intact, sclera clear, conjunctiva normal.  HENT: Normocephalic, oral pharynx with moist mucus membranes, few remaining teeth on bottom sides, loose on both sides. No signs of infection. No goiter appreciated.   Respiratory: Clear to auscultation bilaterally, no crackles or wheezing. No cough or obvious dyspnea.  Cardiovascular: Regular rate and rhythm, normal S1 and S2, and no murmur noted.  Carotids +2, no bruits. No edema. Palpable pulses to radial  DP and PT arteries.   GI: Normal bowel sounds, soft, non-distended, non-tender, no masses palpated, no hepatosplenomegaly.  Lymph/Hematologic: No cervical lymphadenopathy and no supraclavicular lymphadenopathy.  Genitourinary: Deferred.   Skin: Warm and dry.    Musculoskeletal: Full ROM of neck. There is no redness, warmth, or swelling of the joints. Gross motor strength is normal.    Neurologic: Awake, alert, oriented to name, place and time. Cranial nerves " II-XII are grossly intact. Gait is normal.   Neuropsychiatric: Calm, cooperative. Normal affect.     Prior Labs/Diagnostic Studies   All labs and imaging personally reviewed   Lab Results   Component Value Date    WBC 9.8 03/10/2022    WBC 7.1 05/29/2015     Lab Results   Component Value Date    RBC 4.22 03/10/2022    RBC 4.33 05/29/2015     Lab Results   Component Value Date    HGB 13.3 03/10/2022    HGB 13.5 05/29/2015     Lab Results   Component Value Date    HCT 38.8 03/10/2022    HCT 39.3 05/29/2015     Lab Results   Component Value Date    MCV 92 03/10/2022    MCV 91 05/29/2015     Lab Results   Component Value Date    MCH 31.5 03/10/2022    MCH 31.2 05/29/2015     Lab Results   Component Value Date    MCHC 34.3 03/10/2022    MCHC 34.4 05/29/2015     Lab Results   Component Value Date    RDW 12.6 03/10/2022    RDW 12.0 05/29/2015     Lab Results   Component Value Date     03/10/2022     05/29/2015     Last Comprehensive Metabolic Panel:  Sodium   Date Value Ref Range Status   05/29/2015 139 133 - 144 mmol/L Final     Potassium   Date Value Ref Range Status   05/29/2015 3.8 3.4 - 5.3 mmol/L Final     Chloride   Date Value Ref Range Status   05/29/2015 106 94 - 109 mmol/L Final     Carbon Dioxide   Date Value Ref Range Status   05/29/2015 25 20 - 32 mmol/L Final     Anion Gap   Date Value Ref Range Status   05/29/2015 8 3 - 14 mmol/L Final     Glucose   Date Value Ref Range Status   02/15/2022 99 70 - 99 mg/dL Final   05/29/2015 121 (H) 70 - 99 mg/dL Final     Urea Nitrogen   Date Value Ref Range Status   05/29/2015 13 7 - 30 mg/dL Final     Creatinine   Date Value Ref Range Status   05/29/2015 0.87 0.66 - 1.25 mg/dL Final     GFR Estimate   Date Value Ref Range Status   05/29/2015 >90  Non  GFR Calc   >60 mL/min/1.7m2 Final     Calcium   Date Value Ref Range Status   05/29/2015 8.6 8.5 - 10.1 mg/dL Final     Lab Results   Component Value Date    AST 19 03/24/2022     Lab Results    Component Value Date    ALT 25 03/24/2022     No results found for: BILICONJ   Lab Results   Component Value Date    BILITOTAL 0.5 03/24/2022     Lab Results   Component Value Date    ALBUMIN 3.9 03/24/2022     Lab Results   Component Value Date    PROTTOTAL 7.6 03/24/2022      Lab Results   Component Value Date    ALKPHOS 59 03/24/2022     EKG: Not indicated    3/11/22 CT abd/pelvis  Findings  HEPATOBILIARY: Diffuse hepatic steatosis. No significant mass. No bile  duct dilatation. No calcified gallstones.     PANCREAS: 2.5 cm cystic lesion in the uncinate process of the  pancreas. Pancreas otherwise unremarkable.     SPLEEN: Normal size.     ADRENAL GLANDS: No significant nodules.     KIDNEYS/BLADDER: No significant mass, stones, or hydronephrosis. There  are simple or benign cysts. No follow up is needed.     BOWEL: No obstruction or inflammatory change. Normal appendix. No  diverticulitis.     PELVIC ORGANS: No pelvic masses.     ADDITIONAL FINDINGS: No ascites.     MUSCULOSKELETAL: No frankly destructive bony lesions.                                                                      IMPRESSION: No acute process demonstrated.       The patient's records and results personally reviewed by this provider.     Outside records reviewed from: Care Everywhere    Assessment      Junior Rodriguez is a 41 year old male seen as a PAC referral for risk assessment and optimization for anesthesia.    Plan/Recommendations  Pt will be optimized for the proposed procedure.  See below for details on the assessment, risk, and preoperative recommendations    NEUROLOGY  - No history of TIA, CVA or seizure    -Post Op delirium risk factors:  No risk identified    ENT  - No current airway concerns.  Will need to be reassessed day of surgery.  Mallampati: I  TM: > 3  Few remaining teeth on bottom sides. Loose teeth    CARDIAC  No cardiac history, symptoms or meds.   - METS (Metabolic Equivalents)>4    RCRI: 0.4% risk of serious cardiac  "event    PULMONARY  30 pack year smoking. Denies cough or shortness of breath. Smoking cessation discussed. Plans to quit in the future.    ESTEBAN RF 3/8=Intermediate risk    - Tobacco History      History   Smoking Status     Current Every Day Smoker     Packs/day: 1.00     Years: 30.00     Types: Cigarettes   Smokeless Tobacco     Never Used     Comment: Plans to quit in future       GI: Denies GERD. No N/V. Intermittent abdominal pain.  PONV Low Risk  Total Score: 0        ENDOCRINE    - BMI: Estimated body mass index is 30.37 kg/m  as calculated from the following:    Height as of this encounter: 1.81 m (5' 11.25\").    Weight as of this encounter: 99.5 kg (219 lb 4.8 oz).  Overweight (BMI 25.0-29.9)  - No history of Diabetes Mellitus    HEME  VTE Low Risk 0.26%            Total Score: 0      - No history of abnormal bleeding or antiplatelet use.  - Denies history of blood transfusion      The patient is optimized for their procedure. AVS with information on surgery time/arrival time, meds and NPO status given by nursing staff. No further diagnostic testing indicated.      On the day of service:     Prep time: 10 minutes (Additional prep completed day before visit)  Visit time: 14 minutes  Documentation time: 20 minutes  ------------------------------------------  Total time: 44  minutes      [facility] [place] [facility]  Preoperative Assessment Center  [facility]  [facility]  Phone: [phone]  Fax: [phone]  " Patient admitted with worsening urinary incontinence. Patient noted with elevated troponin level that is now downtrending.

## 2022-11-15 NOTE — PHYSICAL THERAPY INITIAL EVALUATION ADULT - ADDITIONAL COMMENTS
Per care coordination and corroborated with patient at bedside: Patient lives in an apartment, +elevator. Patient has a home health aide 5 hours x 5 days, uses a cane and rolling walker.

## 2022-11-15 NOTE — PHYSICAL THERAPY INITIAL EVALUATION ADULT - BED MOBILITY TRAINING, PT EVAL
Pt will independently perform rolling and scooting/bridging to help prevent pressure ulcers, by 1-2 weeks

## 2022-11-15 NOTE — CONSULT NOTE ADULT - ASSESSMENT
89 year old male with functional urinary incontinence. Pmhx significant for HTN, HLD, BPH, CABG, MI, AAA    bladder scan with minimal residual  while functionally incontinent does not require any urological surgical intervention at this time.   recall prn

## 2022-11-15 NOTE — CONSULT NOTE ADULT - SUBJECTIVE AND OBJECTIVE BOX
HPI:  Patient is a 89 year old male with PMH HTN, HLD, AAA repair, BPH, MI who presented to the ER c/o urinary incontinence > 2 years. Pt states that he is now urinating more frequently than usual, and feels he has to empty his bladder twice an hour. Denies fever, chills, gross hematuria.       PAST MEDICAL & SURGICAL HISTORY:  Hypertension      Benign prostate hyperplasia      Hypercholesterolemia      Hemorrhoids      MI (myocardial infarction)      S/P CABG (coronary artery bypass graft)      PAF (paroxysmal atrial fibrillation)      AAA (abdominal aortic aneurysm)      Status post cataract extraction  bilateral eyes      History of tonsillectomy          Allergies    No Known Allergies    Intolerances        SOCIAL HISTORY  FAMILY HISTORY:  Family history of heart disease (Father)        MEDICATIONS  (STANDING):  aMIOdarone    Tablet 200 milliGRAM(s) Oral daily  amLODIPine   Tablet 10 milliGRAM(s) Oral daily  aspirin enteric coated 81 milliGRAM(s) Oral daily  atorvastatin 80 milliGRAM(s) Oral at bedtime  cefTRIAXone   IVPB 1000 milliGRAM(s) IV Intermittent every 24 hours  doxazosin 4 milliGRAM(s) Oral daily  heparin   Injectable 5000 Unit(s) SubCutaneous every 8 hours  melatonin 3 milliGRAM(s) Oral at bedtime  metoprolol tartrate 25 milliGRAM(s) Oral every 6 hours  pantoprazole    Tablet 40 milliGRAM(s) Oral before breakfast  senna 2 Tablet(s) Oral at bedtime    MEDICATIONS  (PRN):  acetaminophen     Tablet .. 650 milliGRAM(s) Oral every 6 hours PRN Mild Pain (1 - 3)      ROS:    General:  No wt loss, fevers, chills, night sweats  Eyes:  Good vision, no reported pain  ENT:  No sore throat, pain, runny nose, dysphagia  CV:  No pain, palpitatioins, hypo/hypertension  Resp:  No dyspnea, cough, tachypnea, wheezing  GI:  No pain, nausea, vomiting, diarrhea, constipatiion  :  No pain, bleeding, incontinence, nocturia, frequency  Muscle:  No pain, weakness  Neuro:  No weakness, tingling, memory problems  Psych:  No fatigue, insomnia, mood problems, depression  Endocrine:  No polyuria, polydypsia, cold/heat intolerance  Heme:  No petechiae, ecchymosis, easy bruisability  Skin:  No rash, tattoos, scars, edema      Physical Exam:    Vital Signs:  Vital Signs Last 24 Hrs  T(C): 36.2 (15 Nov 2022 16:45), Max: 36.9 (15 Nov 2022 04:44)  T(F): 97.2 (15 Nov 2022 16:45), Max: 98.4 (15 Nov 2022 04:44)  HR: 56 (15 Nov 2022 16:45) (54 - 72)  BP: 142/67 (15 Nov 2022 16:45) (142/65 - 172/83)  BP(mean): --  RR: 18 (15 Nov 2022 16:45) (18 - 18)  SpO2: 99% (15 Nov 2022 16:45) (97% - 99%)    Parameters below as of 15 Nov 2022 14:32  Patient On (Oxygen Delivery Method): room air      Daily     Daily Weight in k (15 Nov 2022 04:44)  I&O's Summary    15 Nov 2022 07:01  -  15 Nov 2022 18:01  --------------------------------------------------------  IN: 240 mL / OUT: 1200 mL / NET: -960 mL        General:  Appears stated age,  well-nourished, no distress  HEENT:  NC/AT, patent nares w/ pink mucosa, OP moist and pink,  PERRL, EOMI, conjunctivae clear, no thyromegaly, nodules, adenopathy, no JVD  Chest:  Full & symmetric excursion, no increased effort.   Cardiovascular:  Regular rhythm, S1, S2,   Abdomen:  Soft, non-tender, non-distended, normoactive bowel sounds.  Genitalia: Circumcised Phallus, Adequate meatus, no hypospadias. Both testicles descended, non tender, no mass. No epididymitis or epididymal mass. No hydrocele.  Rectal Examination: Deferred.  Extremities:  no edema, pedal pusation are present, no calf tenderness.  Skin:  No rash/erythema/ecchymoses/petechiae/wounds/abscess/warm/dry  Musculoskeletal:  Full ROM in all joints w/o swelling/tenderness/effusion  Neuro/Psych:  Alert, oriented, normal and grossly intact and symmetrical.      LABS:                        12.0   9.26  )-----------( 203      ( 15 Nov 2022 06:02 )             37.2     11-15    142  |  112<H>  |  45<H>  ----------------------------<  99  4.0   |  22  |  2.35<H>    Ca    9.0      15 Nov 2022 06:02    TPro  6.8  /  Alb  2.8<L>  /  TBili  0.7  /  DBili  x   /  AST  13<L>  /  ALT  14  /  AlkPhos  79  11-15          RADIOLOGY & ADDITIONAL STUDIES: HPI:  Patient is a 89 year old male with PMH HTN, HLD, AAA repair, BPH, MI who presented to the ER c/o urinary incontinence > 2 years. Pt states that he is now urinating more frequently than usual, and feels he has to empty his bladder twice an hour. Denies fever, chills, gross hematuria.       PAST MEDICAL & SURGICAL HISTORY:  Hypertension  Benign prostate hyperplasia  Hypercholesterolemia  Hemorrhoids  MI (myocardial infarction)  S/P CABG (coronary artery bypass graft)  PAF (paroxysmal atrial fibrillation)  AAA (abdominal aortic aneurysm)  Status post cataract extraction  bilateral eyes  History of tonsillectomy    Allergies  No Known Allergies    FAMILY HISTORY:  Family history of heart disease (Father)        MEDICATIONS  (STANDING):  aMIOdarone    Tablet 200 milliGRAM(s) Oral daily  amLODIPine   Tablet 10 milliGRAM(s) Oral daily  aspirin enteric coated 81 milliGRAM(s) Oral daily  atorvastatin 80 milliGRAM(s) Oral at bedtime  cefTRIAXone   IVPB 1000 milliGRAM(s) IV Intermittent every 24 hours  doxazosin 4 milliGRAM(s) Oral daily  heparin   Injectable 5000 Unit(s) SubCutaneous every 8 hours  melatonin 3 milliGRAM(s) Oral at bedtime  metoprolol tartrate 25 milliGRAM(s) Oral every 6 hours  pantoprazole    Tablet 40 milliGRAM(s) Oral before breakfast  senna 2 Tablet(s) Oral at bedtime    MEDICATIONS  (PRN):  acetaminophen     Tablet .. 650 milliGRAM(s) Oral every 6 hours PRN Mild Pain (1 - 3)      ROS:  Contained within HPI    Vital Signs:  Vital Signs Last 24 Hrs  T(C): 36.2 (15 Nov 2022 16:45), Max: 36.9 (15 Nov 2022 04:44)  T(F): 97.2 (15 Nov 2022 16:45), Max: 98.4 (15 Nov 2022 04:44)  HR: 56 (15 Nov 2022 16:45) (54 - 72)  BP: 142/67 (15 Nov 2022 16:45) (142/65 - 172/83)  RR: 18 (15 Nov 2022 16:45) (18 - 18)  SpO2: 99% (15 Nov 2022 16:45) (97% - 99%)    General: Appears stated age, well-nourished, no distress  Chest: Respirations nonlabored  Cardiovascular: S1S2 RRR  Abdomen:  Soft, NTND   Genitalia: No palpable bladder distention, no SP tenderness. Both testicles descended, non tender, no mass. No epididymitis or epididymal mass. No hydrocele.  Rectal Examination: Deferred.  Extremities: No pedal edema b/l   Skin:  No rash  Neuro/Psych: Alert and oriented     LABS:                        12.0   9.26  )-----------( 203      ( 15 Nov 2022 06:02 )             37.2     11-15    142  |  112<H>  |  45<H>  ----------------------------<  99  4.0   |  22  |  2.35<H>    Ca    9.0      15 Nov 2022 06:02    TPro  6.8  /  Alb  2.8<L>  /  TBili  0.7  /  DBili  x   /  AST  13<L>  /  ALT  14  /  AlkPhos  79  11-15      RADIOLOGY & ADDITIONAL STUDIES:  < from: US Kidney and Bladder (11.15.22 @ 11:07) >    FINDINGS:  Right kidney: 11.4 cm. Multiple simple renal cysts, largest of which   measures 2.2 cm in the lower pole. In addition there is a complex cyst   with septations in the lower pole and measures 1.9 x 1.5 x 2.2 cm and is   stable. There is a 0.7 cm nonobstructive renal calculus in the midpole   region. No hydronephrosis.    Left kidney: 10.0 cm. Multiple renal cysts, largest of which is in the   lower pole and measures 2.3 cm. No renal calculi or hydronephrosis.    Urinary bladder: Within normal limits.    Prostate: Enlarged. Measures 4.7 x 3.4 x 4.3 cm, with an estimated volume   of 45 mL.    IMPRESSION:  Bilateral renal cysts.    Nonobstructing right renal calculus.    Prostate gland enlargement.    --- End of Report ---      < end of copied text >

## 2022-11-16 LAB
ALBUMIN SERPL ELPH-MCNC: 2.8 G/DL — LOW (ref 3.3–5)
ALP SERPL-CCNC: 81 U/L — SIGNIFICANT CHANGE UP (ref 40–120)
ALT FLD-CCNC: 15 U/L — SIGNIFICANT CHANGE UP (ref 12–78)
ANION GAP SERPL CALC-SCNC: 8 MMOL/L — SIGNIFICANT CHANGE UP (ref 5–17)
AST SERPL-CCNC: 12 U/L — LOW (ref 15–37)
BILIRUB SERPL-MCNC: 0.5 MG/DL — SIGNIFICANT CHANGE UP (ref 0.2–1.2)
BUN SERPL-MCNC: 46 MG/DL — HIGH (ref 7–23)
CALCIUM SERPL-MCNC: 9 MG/DL — SIGNIFICANT CHANGE UP (ref 8.5–10.1)
CHLORIDE SERPL-SCNC: 116 MMOL/L — HIGH (ref 96–108)
CO2 SERPL-SCNC: 21 MMOL/L — LOW (ref 22–31)
CREAT SERPL-MCNC: 2.37 MG/DL — HIGH (ref 0.5–1.3)
EGFR: 25 ML/MIN/1.73M2 — LOW
GLUCOSE SERPL-MCNC: 114 MG/DL — HIGH (ref 70–99)
HCT VFR BLD CALC: 38.1 % — LOW (ref 39–50)
HGB BLD-MCNC: 12.1 G/DL — LOW (ref 13–17)
MCHC RBC-ENTMCNC: 27.9 PG — SIGNIFICANT CHANGE UP (ref 27–34)
MCHC RBC-ENTMCNC: 31.8 G/DL — LOW (ref 32–36)
MCV RBC AUTO: 88 FL — SIGNIFICANT CHANGE UP (ref 80–100)
NRBC # BLD: 0 /100 WBCS — SIGNIFICANT CHANGE UP (ref 0–0)
PLATELET # BLD AUTO: 232 K/UL — SIGNIFICANT CHANGE UP (ref 150–400)
POTASSIUM SERPL-MCNC: 4.3 MMOL/L — SIGNIFICANT CHANGE UP (ref 3.5–5.3)
POTASSIUM SERPL-SCNC: 4.3 MMOL/L — SIGNIFICANT CHANGE UP (ref 3.5–5.3)
PROT SERPL-MCNC: 7.2 GM/DL — SIGNIFICANT CHANGE UP (ref 6–8.3)
RBC # BLD: 4.33 M/UL — SIGNIFICANT CHANGE UP (ref 4.2–5.8)
RBC # FLD: 15.1 % — HIGH (ref 10.3–14.5)
SODIUM SERPL-SCNC: 145 MMOL/L — SIGNIFICANT CHANGE UP (ref 135–145)
WBC # BLD: 8.77 K/UL — SIGNIFICANT CHANGE UP (ref 3.8–10.5)
WBC # FLD AUTO: 8.77 K/UL — SIGNIFICANT CHANGE UP (ref 3.8–10.5)

## 2022-11-16 PROCEDURE — 93010 ELECTROCARDIOGRAM REPORT: CPT

## 2022-11-16 RX ORDER — METOPROLOL TARTRATE 50 MG
5 TABLET ORAL ONCE
Refills: 0 | Status: COMPLETED | OUTPATIENT
Start: 2022-11-16 | End: 2022-11-16

## 2022-11-16 RX ADMIN — HEPARIN SODIUM 5000 UNIT(S): 5000 INJECTION INTRAVENOUS; SUBCUTANEOUS at 05:15

## 2022-11-16 RX ADMIN — AMIODARONE HYDROCHLORIDE 200 MILLIGRAM(S): 400 TABLET ORAL at 10:59

## 2022-11-16 RX ADMIN — Medication 25 MILLIGRAM(S): at 10:59

## 2022-11-16 RX ADMIN — Medication 81 MILLIGRAM(S): at 10:59

## 2022-11-16 RX ADMIN — Medication 5 MILLIGRAM(S): at 06:24

## 2022-11-16 RX ADMIN — Medication 25 MILLIGRAM(S): at 17:18

## 2022-11-16 RX ADMIN — SENNA PLUS 2 TABLET(S): 8.6 TABLET ORAL at 21:36

## 2022-11-16 RX ADMIN — Medication 25 MILLIGRAM(S): at 01:40

## 2022-11-16 RX ADMIN — Medication 3 MILLIGRAM(S): at 21:36

## 2022-11-16 RX ADMIN — CEFTRIAXONE 100 MILLIGRAM(S): 500 INJECTION, POWDER, FOR SOLUTION INTRAMUSCULAR; INTRAVENOUS at 17:18

## 2022-11-16 RX ADMIN — Medication 4 MILLIGRAM(S): at 13:51

## 2022-11-16 RX ADMIN — HEPARIN SODIUM 5000 UNIT(S): 5000 INJECTION INTRAVENOUS; SUBCUTANEOUS at 13:51

## 2022-11-16 RX ADMIN — HEPARIN SODIUM 5000 UNIT(S): 5000 INJECTION INTRAVENOUS; SUBCUTANEOUS at 21:36

## 2022-11-16 RX ADMIN — AMLODIPINE BESYLATE 10 MILLIGRAM(S): 2.5 TABLET ORAL at 11:00

## 2022-11-16 RX ADMIN — ATORVASTATIN CALCIUM 80 MILLIGRAM(S): 80 TABLET, FILM COATED ORAL at 21:36

## 2022-11-17 LAB
ALBUMIN SERPL ELPH-MCNC: 2.6 G/DL — LOW (ref 3.3–5)
ALP SERPL-CCNC: 78 U/L — SIGNIFICANT CHANGE UP (ref 40–120)
ALT FLD-CCNC: 14 U/L — SIGNIFICANT CHANGE UP (ref 12–78)
ANION GAP SERPL CALC-SCNC: 6 MMOL/L — SIGNIFICANT CHANGE UP (ref 5–17)
AST SERPL-CCNC: 13 U/L — LOW (ref 15–37)
BILIRUB SERPL-MCNC: 0.6 MG/DL — SIGNIFICANT CHANGE UP (ref 0.2–1.2)
BUN SERPL-MCNC: 49 MG/DL — HIGH (ref 7–23)
CALCIUM SERPL-MCNC: 9.2 MG/DL — SIGNIFICANT CHANGE UP (ref 8.5–10.1)
CHLORIDE SERPL-SCNC: 115 MMOL/L — HIGH (ref 96–108)
CO2 SERPL-SCNC: 25 MMOL/L — SIGNIFICANT CHANGE UP (ref 22–31)
CREAT SERPL-MCNC: 2.47 MG/DL — HIGH (ref 0.5–1.3)
CRP SERPL-MCNC: 136 MG/L — HIGH
EGFR: 24 ML/MIN/1.73M2 — LOW
ERYTHROCYTE [SEDIMENTATION RATE] IN BLOOD: 56 MM/HR — HIGH (ref 0–20)
GLUCOSE SERPL-MCNC: 128 MG/DL — HIGH (ref 70–99)
HCT VFR BLD CALC: 37 % — LOW (ref 39–50)
HGB BLD-MCNC: 11.8 G/DL — LOW (ref 13–17)
MCHC RBC-ENTMCNC: 27.9 PG — SIGNIFICANT CHANGE UP (ref 27–34)
MCHC RBC-ENTMCNC: 31.9 G/DL — LOW (ref 32–36)
MCV RBC AUTO: 87.5 FL — SIGNIFICANT CHANGE UP (ref 80–100)
NRBC # BLD: 0 /100 WBCS — SIGNIFICANT CHANGE UP (ref 0–0)
PLATELET # BLD AUTO: 207 K/UL — SIGNIFICANT CHANGE UP (ref 150–400)
POTASSIUM SERPL-MCNC: 4.3 MMOL/L — SIGNIFICANT CHANGE UP (ref 3.5–5.3)
POTASSIUM SERPL-SCNC: 4.3 MMOL/L — SIGNIFICANT CHANGE UP (ref 3.5–5.3)
PROT SERPL-MCNC: 6.8 GM/DL — SIGNIFICANT CHANGE UP (ref 6–8.3)
RBC # BLD: 4.23 M/UL — SIGNIFICANT CHANGE UP (ref 4.2–5.8)
RBC # FLD: 15.2 % — HIGH (ref 10.3–14.5)
SODIUM SERPL-SCNC: 146 MMOL/L — HIGH (ref 135–145)
TROPONIN I, HIGH SENSITIVITY RESULT: 70.9 NG/L — SIGNIFICANT CHANGE UP
WBC # BLD: 8.95 K/UL — SIGNIFICANT CHANGE UP (ref 3.8–10.5)
WBC # FLD AUTO: 8.95 K/UL — SIGNIFICANT CHANGE UP (ref 3.8–10.5)

## 2022-11-17 RX ORDER — SODIUM CHLORIDE 9 MG/ML
1000 INJECTION, SOLUTION INTRAVENOUS
Refills: 0 | Status: DISCONTINUED | OUTPATIENT
Start: 2022-11-17 | End: 2022-11-19

## 2022-11-17 RX ADMIN — HEPARIN SODIUM 5000 UNIT(S): 5000 INJECTION INTRAVENOUS; SUBCUTANEOUS at 05:01

## 2022-11-17 RX ADMIN — Medication 81 MILLIGRAM(S): at 12:09

## 2022-11-17 RX ADMIN — HEPARIN SODIUM 5000 UNIT(S): 5000 INJECTION INTRAVENOUS; SUBCUTANEOUS at 12:10

## 2022-11-17 RX ADMIN — Medication 25 MILLIGRAM(S): at 12:10

## 2022-11-17 RX ADMIN — AMIODARONE HYDROCHLORIDE 200 MILLIGRAM(S): 400 TABLET ORAL at 05:02

## 2022-11-17 RX ADMIN — SENNA PLUS 2 TABLET(S): 8.6 TABLET ORAL at 21:18

## 2022-11-17 RX ADMIN — Medication 25 MILLIGRAM(S): at 00:04

## 2022-11-17 RX ADMIN — Medication 4 MILLIGRAM(S): at 05:03

## 2022-11-17 RX ADMIN — AMLODIPINE BESYLATE 10 MILLIGRAM(S): 2.5 TABLET ORAL at 05:02

## 2022-11-17 RX ADMIN — Medication 25 MILLIGRAM(S): at 17:14

## 2022-11-17 RX ADMIN — Medication 3 MILLIGRAM(S): at 21:23

## 2022-11-17 RX ADMIN — PANTOPRAZOLE SODIUM 40 MILLIGRAM(S): 20 TABLET, DELAYED RELEASE ORAL at 05:02

## 2022-11-17 RX ADMIN — Medication 25 MILLIGRAM(S): at 05:02

## 2022-11-17 RX ADMIN — ATORVASTATIN CALCIUM 80 MILLIGRAM(S): 80 TABLET, FILM COATED ORAL at 21:18

## 2022-11-17 RX ADMIN — SODIUM CHLORIDE 75 MILLILITER(S): 9 INJECTION, SOLUTION INTRAVENOUS at 14:18

## 2022-11-17 NOTE — DIETITIAN INITIAL EVALUATION ADULT - ORAL INTAKE PTA/DIET HISTORY
Pt c confusion, was able to state date of birth.  This writer spoke to pt's daughter Elvia via phone.  Pt stopped eating, c poor oral intake 1 week PTA, daughter was giving vanilla/chocolate Ensure Plus 1 x day at home.    As per daughter, pt also does not like to drink much fluids.  Pt lived alone c HHA, was getting meals on wheels.  Pt without food allergies, intolerances, chewing/swallowing problems.

## 2022-11-17 NOTE — DIETITIAN INITIAL EVALUATION ADULT - PHYSCIAL ASSESSMENT
Pt c visible mild orbital and temple depletion and moderate  clavicle, shoulder depletion/overweight/debilitated

## 2022-11-17 NOTE — DIETITIAN INITIAL EVALUATION ADULT - NS FNS WEIGHT CHANGE REASON
As per daughter, pt had lost weight, used to be heavier, recent usual wt. may have been around 170-180 LBS  Height: 5'7"

## 2022-11-17 NOTE — DIETITIAN INITIAL EVALUATION ADULT - NS FNS DIET ORDER
Diet, DASH/TLC:   Sodium & Cholesterol Restricted  Supplement Feeding Modality:  Oral  Ensure Enlive Cans or Servings Per Day:  1       Frequency:  Three Times a day (11-16-22 @ 14:11)

## 2022-11-17 NOTE — DIETITIAN NUTRITION RISK NOTIFICATION - TREATMENT: THE FOLLOWING DIET HAS BEEN RECOMMENDED
Diet, Regular:   Low Sodium  Supplement Feeding Modality:  Oral  Ensure Clear Cans or Servings Per Day:  1       Frequency:  Three Times a day (11-17-22 @ 15:55) [Pending Verification By Attending]  Diet, DASH/TLC:   Sodium & Cholesterol Restricted  Supplement Feeding Modality:  Oral  Ensure Enlive Cans or Servings Per Day:  1       Frequency:  Three Times a day (11-16-22 @ 14:11) [Active]

## 2022-11-17 NOTE — DIETITIAN INITIAL EVALUATION ADULT - PERTINENT LABORATORY DATA
11-17    146<H>  |  115<H>  |  49<H>  ----------------------------<  128<H>  4.3   |  25  |  2.47<H>    Ca    9.2      17 Nov 2022 07:25    TPro  6.8  /  Alb  2.6<L>  /  TBili  0.6  /  DBili  x   /  AST  13<L>  /  ALT  14  /  AlkPhos  78  11-17

## 2022-11-17 NOTE — DIETITIAN INITIAL EVALUATION ADULT - PERTINENT MEDS FT
MEDICATIONS  (STANDING):  aMIOdarone    Tablet 200 milliGRAM(s) Oral daily  amLODIPine   Tablet 10 milliGRAM(s) Oral daily  aspirin enteric coated 81 milliGRAM(s) Oral daily  atorvastatin 80 milliGRAM(s) Oral at bedtime  doxazosin 4 milliGRAM(s) Oral daily  heparin   Injectable 5000 Unit(s) SubCutaneous every 8 hours  melatonin 3 milliGRAM(s) Oral at bedtime  metoprolol tartrate 25 milliGRAM(s) Oral every 6 hours  pantoprazole    Tablet 40 milliGRAM(s) Oral before breakfast  senna 2 Tablet(s) Oral at bedtime  sodium chloride 0.45%. 1000 milliLiter(s) (75 mL/Hr) IV Continuous <Continuous>    MEDICATIONS  (PRN):  acetaminophen     Tablet .. 650 milliGRAM(s) Oral every 6 hours PRN Mild Pain (1 - 3)

## 2022-11-17 NOTE — DIETITIAN INITIAL EVALUATION ADULT - ETIOLOGY
inadequate protein-energy intake in setting of UTI, CARLOS  Complex Repair And Double Advancement Flap Text: The defect edges were debeveled with a #15 scalpel blade.  The primary defect was closed partially with a complex linear closure.  Given the location of the remaining defect, shape of the defect and the proximity to free margins a double advancement flap was deemed most appropriate for complete closure of the defect.  Using a sterile surgical marker, an appropriate advancement flap was drawn incorporating the defect and placing the expected incisions within the relaxed skin tension lines where possible.    The area thus outlined was incised deep to adipose tissue with a #15 scalpel blade.  The skin margins were undermined to an appropriate distance in all directions utilizing iris scissors.

## 2022-11-18 LAB
ALBUMIN SERPL ELPH-MCNC: 2.4 G/DL — LOW (ref 3.3–5)
ALP SERPL-CCNC: 89 U/L — SIGNIFICANT CHANGE UP (ref 40–120)
ALT FLD-CCNC: 17 U/L — SIGNIFICANT CHANGE UP (ref 12–78)
ANION GAP SERPL CALC-SCNC: 6 MMOL/L — SIGNIFICANT CHANGE UP (ref 5–17)
AST SERPL-CCNC: 14 U/L — LOW (ref 15–37)
BILIRUB SERPL-MCNC: 0.6 MG/DL — SIGNIFICANT CHANGE UP (ref 0.2–1.2)
BUN SERPL-MCNC: 56 MG/DL — HIGH (ref 7–23)
CALCIUM SERPL-MCNC: 9.4 MG/DL — SIGNIFICANT CHANGE UP (ref 8.5–10.1)
CHLORIDE SERPL-SCNC: 113 MMOL/L — HIGH (ref 96–108)
CO2 SERPL-SCNC: 25 MMOL/L — SIGNIFICANT CHANGE UP (ref 22–31)
CREAT SERPL-MCNC: 2.49 MG/DL — HIGH (ref 0.5–1.3)
CRP SERPL-MCNC: 167 MG/L — HIGH
EGFR: 24 ML/MIN/1.73M2 — LOW
ERYTHROCYTE [SEDIMENTATION RATE] IN BLOOD: 79 MM/HR — HIGH (ref 0–20)
GLUCOSE SERPL-MCNC: 120 MG/DL — HIGH (ref 70–99)
HCT VFR BLD CALC: 38.1 % — LOW (ref 39–50)
HGB BLD-MCNC: 11.9 G/DL — LOW (ref 13–17)
MCHC RBC-ENTMCNC: 27.7 PG — SIGNIFICANT CHANGE UP (ref 27–34)
MCHC RBC-ENTMCNC: 31.2 G/DL — LOW (ref 32–36)
MCV RBC AUTO: 88.8 FL — SIGNIFICANT CHANGE UP (ref 80–100)
NRBC # BLD: 0 /100 WBCS — SIGNIFICANT CHANGE UP (ref 0–0)
PLATELET # BLD AUTO: 221 K/UL — SIGNIFICANT CHANGE UP (ref 150–400)
POTASSIUM SERPL-MCNC: 4.2 MMOL/L — SIGNIFICANT CHANGE UP (ref 3.5–5.3)
POTASSIUM SERPL-SCNC: 4.2 MMOL/L — SIGNIFICANT CHANGE UP (ref 3.5–5.3)
PROT SERPL-MCNC: 7.3 GM/DL — SIGNIFICANT CHANGE UP (ref 6–8.3)
RBC # BLD: 4.29 M/UL — SIGNIFICANT CHANGE UP (ref 4.2–5.8)
RBC # FLD: 15 % — HIGH (ref 10.3–14.5)
SODIUM SERPL-SCNC: 144 MMOL/L — SIGNIFICANT CHANGE UP (ref 135–145)
URATE SERPL-MCNC: 8.1 MG/DL — SIGNIFICANT CHANGE UP (ref 3.4–8.8)
WBC # BLD: 10.7 K/UL — HIGH (ref 3.8–10.5)
WBC # FLD AUTO: 10.7 K/UL — HIGH (ref 3.8–10.5)

## 2022-11-18 PROCEDURE — 73110 X-RAY EXAM OF WRIST: CPT | Mod: 26,LT

## 2022-11-18 RX ORDER — CEFTRIAXONE 500 MG/1
1000 INJECTION, POWDER, FOR SOLUTION INTRAMUSCULAR; INTRAVENOUS ONCE
Refills: 0 | Status: COMPLETED | OUTPATIENT
Start: 2022-11-18 | End: 2022-11-18

## 2022-11-18 RX ORDER — CEFTRIAXONE 500 MG/1
1000 INJECTION, POWDER, FOR SOLUTION INTRAMUSCULAR; INTRAVENOUS EVERY 24 HOURS
Refills: 0 | Status: DISCONTINUED | OUTPATIENT
Start: 2022-11-19 | End: 2022-11-22

## 2022-11-18 RX ORDER — LACTOBACILLUS ACIDOPHILUS 100MM CELL
1 CAPSULE ORAL EVERY 12 HOURS
Refills: 0 | Status: DISCONTINUED | OUTPATIENT
Start: 2022-11-18 | End: 2022-11-22

## 2022-11-18 RX ORDER — CEFTRIAXONE 500 MG/1
INJECTION, POWDER, FOR SOLUTION INTRAMUSCULAR; INTRAVENOUS
Refills: 0 | Status: DISCONTINUED | OUTPATIENT
Start: 2022-11-18 | End: 2022-11-22

## 2022-11-18 RX ORDER — VANCOMYCIN HCL 1 G
1000 VIAL (EA) INTRAVENOUS ONCE
Refills: 0 | Status: COMPLETED | OUTPATIENT
Start: 2022-11-18 | End: 2022-11-18

## 2022-11-18 RX ADMIN — Medication 4 MILLIGRAM(S): at 05:48

## 2022-11-18 RX ADMIN — Medication 25 MILLIGRAM(S): at 05:48

## 2022-11-18 RX ADMIN — Medication 25 MILLIGRAM(S): at 00:10

## 2022-11-18 RX ADMIN — Medication 650 MILLIGRAM(S): at 11:36

## 2022-11-18 RX ADMIN — HEPARIN SODIUM 5000 UNIT(S): 5000 INJECTION INTRAVENOUS; SUBCUTANEOUS at 05:54

## 2022-11-18 RX ADMIN — HEPARIN SODIUM 5000 UNIT(S): 5000 INJECTION INTRAVENOUS; SUBCUTANEOUS at 21:59

## 2022-11-18 RX ADMIN — Medication 250 MILLIGRAM(S): at 18:09

## 2022-11-18 RX ADMIN — CEFTRIAXONE 1000 MILLIGRAM(S): 500 INJECTION, POWDER, FOR SOLUTION INTRAMUSCULAR; INTRAVENOUS at 17:58

## 2022-11-18 RX ADMIN — PANTOPRAZOLE SODIUM 40 MILLIGRAM(S): 20 TABLET, DELAYED RELEASE ORAL at 05:48

## 2022-11-18 RX ADMIN — Medication 25 MILLIGRAM(S): at 11:37

## 2022-11-18 RX ADMIN — Medication 1 TABLET(S): at 17:58

## 2022-11-18 RX ADMIN — Medication 25 MILLIGRAM(S): at 17:59

## 2022-11-18 RX ADMIN — Medication 650 MILLIGRAM(S): at 12:20

## 2022-11-18 RX ADMIN — AMIODARONE HYDROCHLORIDE 200 MILLIGRAM(S): 400 TABLET ORAL at 05:48

## 2022-11-18 RX ADMIN — Medication 81 MILLIGRAM(S): at 11:37

## 2022-11-18 RX ADMIN — AMLODIPINE BESYLATE 10 MILLIGRAM(S): 2.5 TABLET ORAL at 05:48

## 2022-11-18 RX ADMIN — Medication 3 MILLIGRAM(S): at 21:59

## 2022-11-18 RX ADMIN — ATORVASTATIN CALCIUM 80 MILLIGRAM(S): 80 TABLET, FILM COATED ORAL at 21:59

## 2022-11-18 RX ADMIN — HEPARIN SODIUM 5000 UNIT(S): 5000 INJECTION INTRAVENOUS; SUBCUTANEOUS at 14:57

## 2022-11-18 RX ADMIN — SENNA PLUS 2 TABLET(S): 8.6 TABLET ORAL at 21:59

## 2022-11-18 NOTE — CONSULT NOTE ADULT - SUBJECTIVE AND OBJECTIVE BOX
HPI:  89 y/o    male with hx of HTN, HLD, Paroxysmal A Fib,  s/p MI in 2018, s/p CABG, s/p AAA Repair 10yrs. ago, Hemorrhoids, BPH with urinary incontinence, admitted via the ER to [facility] on 11/13/22 with                 Allergies :    No Known Allergies    Intolerances - none        Social History:  Tobacco:  Alcohol:  Recent Travel:  Immunizations:    MEDICATIONS  (STANDING):  aMIOdarone    Tablet 200 milliGRAM(s) Oral daily  amLODIPine   Tablet 10 milliGRAM(s) Oral daily  aspirin enteric coated 81 milliGRAM(s) Oral daily  atorvastatin 80 milliGRAM(s) Oral at bedtime  doxazosin 4 milliGRAM(s) Oral daily  heparin   Injectable 5000 Unit(s) SubCutaneous every 8 hours  melatonin 3 milliGRAM(s) Oral at bedtime  metoprolol tartrate 25 milliGRAM(s) Oral every 6 hours  pantoprazole    Tablet 40 milliGRAM(s) Oral before breakfast  senna 2 Tablet(s) Oral at bedtime  sodium chloride 0.45%. 1000 milliLiter(s) (75 mL/Hr) IV Continuous <Continuous>    MEDICATIONS  (PRN):  acetaminophen     Tablet .. 650 milliGRAM(s) Oral every 6 hours PRN Mild Pain (1 - 3)    LABS:  CBC Full  -  ( 18 Nov 2022 07:30 )  WBC Count : 10.70 K/uL  RBC Count : 4.29 M/uL  Hemoglobin : 11.9 g/dL  Hematocrit : 38.1 %  Platelet Count - Automated : 221 K/uL  Mean Cell Volume : 88.8 fl  Mean Cell Hemoglobin : 27.7 pg  Mean Cell Hemoglobin Concentration : 31.2 g/dL  Auto Neutrophil # : x  Auto Lymphocyte # : x  Auto Monocyte # : x  Auto Eosinophil # : x  Auto Basophil # : x  Auto Neutrophil % : x  Auto Lymphocyte % : x  Auto Monocyte % : x  Auto Eosinophil % : x  Auto Basophil % : x    11-18    144  |  113<H>  |  56<H>  ----------------------------<  120<H>  4.2   |  25  |  2.49<H>    Ca    9.4      18 Nov 2022 07:30    TPro  7.3  /  Alb  2.4<L>  /  TBili  0.6  /  DBili  x   /  AST  14<L>  /  ALT  17  /  AlkPhos  89  11-18    LIVER FUNCTIONS - ( 18 Nov 2022 07:30 )  Alb: 2.4 g/dL / Pro: 7.3 gm/dL / ALK PHOS: 89 U/L / ALT: 17 U/L / AST: 14 U/L / GGT: x           Sedimentation Rate, Erythrocyte: 79 mm/hr (11-18 @ 07:30)  Sedimentation Rate, Erythrocyte: 56 mm/hr (11-17 @ 07:25)    C-Reactive Protein, Serum (11.17.22 @ 08:00)    C-Reactive Protein, Serum: 136 mg/L    C-Reactive Protein, Serum (11.14.22 @ 19:50)    C-Reactive Protein, Serum: 38 mg/L          MICROBIOLOGY:  Specimen Source: Clean Catch Clean Catch (Midstream) (11-13 @ 12:00)  Culture Results:   >100,000 CFU/ml Escherichia coli (11-13 @ 12:00)    -  Amikacin: S <=16    -  Amoxicillin/Clavulanic Acid: S <=8/4    -  Ampicillin: R >16 These ampicillin results predict results for amoxicillin    -  Ampicillin/Sulbactam: S 8/4 Enterobacter, Klebsiella aerogenes, Citrobacter, and Serratia may develop resistance during prolonged therapy (3-4 days)    -  Aztreonam: S <=4    -  Cefazolin: S <=2 For uncomplicated UTI with K. pneumoniae, E. coli, or P. mirablis: LUIS <=16 is sensitive and LUIS >=32 is resistant. This also predicts results for oral agents cefaclor, cefdinir, cefpodoxime, cefprozil, cefuroxime axetil, cephalexin and locarbef for uncomplicated UTI. Note that some isolates may be susceptible to these agents while testing resistant to cefazolin.    -  Cefepime: S <=2    -  Cefoxitin: S <=8    -  Ceftriaxone: S <=1 Enterobacter, Klebsiella aerogenes, Citrobacter, and Serratia may develop resistance during prolonged therapy    -  Ciprofloxacin: S <=0.25    -  Ertapenem: S <=0.5    -  Gentamicin: R >8    -  Imipenem: S <=1    -  Levofloxacin: S <=0.5    -  Meropenem: S <=1    -  Nitrofurantoin: S <=32 Should not be used to treat pyelonephritis    -  Piperacillin/Tazobactam: S <=8    -  Tobramycin: I 8    -  Trimethoprim/Sulfamethoxazole: R >2/38    Method Type: LUIS        Flu With COVID-19 By RALPH (11.13.22 @ 17:07)    SARS-CoV-2 Result: NotDetec: EUA/IVD    Influenza A Result: NotDetec: EUA/IVD    Influenza B Result: NotDetec: EUA/IVD      RADIOLOGY:  < from: US Kidney and Bladder (11.15.22 @ 11:07) >  ACC: [id number] EXAM:  US KIDNEYS AND BLADDER                        PROCEDURE DATE:  11/15/2022    INTERPRETATION:  CLINICAL INFORMATION: Urinary incontinence, Hx UTI.  COMPARISON: Abdominal ultrasound from 1/28/2018.  TECHNIQUE: Sonography of the kidneys and bladder.  FINDINGS:  Right kidney: 11.4 cm. Multiple simple renal cysts, largest of which   measures 2.2 cm in the lower pole. In addition there is a complex cyst   with septations in the lower pole and measures 1.9 x 1.5 x 2.2 cm and is   stable. There is a 0.7 cm nonobstructive renal calculus in the midpole   region. No hydronephrosis.  Left kidney: 10.0 cm. Multiple renal cysts, largest of which is in the   lower pole and measures 2.3 cm. No renal calculi or hydronephrosis.  Urinary bladder: Within normal limits.  Prostate: Enlarged. Measures 4.7 x 3.4 x 4.3 cm, with an estimated volume   of 45 mL.  IMPRESSION:  Bilateral renal cysts.  Nonobstructing right renal calculus.  Prostate gland enlargement.      < from: Xray Chest 1 View- PORTABLE-Urgent (Xray Chest 1 View- PORTABLE-Urgent .) (11.13.22 @ 11:41) >  ACC: [id number] EXAM:  XR CHEST PORTABLE URGENT 1V                        PROCEDURE DATE:  11/13/2022    INTERPRETATION:  AP chest.  CLINICAL INDICATION: Hypertension.  IMPRESSION: Cardiac silhouette is enlarged. The lungs are grossly clear.   Degenerative changes are noted of the bones.        Vital Signs Last 24 Hrs  T(C): 36.8 (18 Nov 2022 10:05), Max: 37 (17 Nov 2022 17:35)  T(F): 98.2 (18 Nov 2022 10:05), Max: 98.6 (17 Nov 2022 17:35)  HR: 72 (18 Nov 2022 11:45) (70 - 72)  BP: 152/74 (18 Nov 2022 11:45) (126/62 - 168/67)  BP(mean): 94 (18 Nov 2022 04:58) (94 - 101)  RR: 18 (18 Nov 2022 11:45) (18 - 19)  SpO2: 98% (18 Nov 2022 11:45) (95% - 98%)  Parameters below as of 18 Nov 2022 10:05  Patient On (Oxygen Delivery Method): room air  Supplemental O2:    PHYSICAL EXAM    General: 89 y/o    male   HEENT:   Neck:  Heart:  Lungs:  Abdomen:  Extremities:  Skin:          I&O's Summary :     17 Nov 2022 07:01  -  18 Nov 2022 07:00  --------------------------------------------------------  IN: 250 mL / OUT: 1200 mL / NET: -950 mL        Impression: 89 y/o   male with hx of HTN. HLD, Paroxysmal AFib, s/p MI in 2018, s/p CABG,     Suggestions: HPI:  89 y/o white male with hx of HTN, HLD, Paroxysmal A Fib, s/p MI in 2018, s/p AAA Repair with Endovascular Stent Grafting of  an Infrarenal AAA along with Bilateral Common Iliac Artery Stent Grafting about 10yrs. ago, Hemorrhoids, BPH with urinary incontinence, admitted via the ER to [facility] on 11/13/22 with c/o worsening urinary incontinence along with increased urinary frequency since   the early AM.  He denied any fevers or chills at that time and in the ER was found to be afebrile and with WBC's WNL.  W/u with U/A with Micro revealed marked pyuria with >50WBC's, >50 RBC's, and LE was positive.  Urine Cx ws obtained and he was begun empirically on Rocephin which was completed on 11/16/22 after Urine  Cx  subseqeuntly grew >100.000 EColi which ws sensitive to the Rocephin.  Further w/u with CXR was done and negative for any infiltrates and  Renal Sono was done and reported with bilateral Renal Cysts, a Rt. Renal non-obstructing Calculus, and an enlarged Prostate.  Patient has remained afebrile but over the past few days has been noted to have increased ESR up to 79 today and increased CRP up to 167.  also noted with slightly increased WBC's this AM to 10,700 and this AM patient began to c/o pain in his Lt. Hand. He denies any hx of trauma and no known hx of Gout. Patient also noted with elevated BUN/ Creat since admission consistent with CKD.  Noted Uric Acid done this AM is WNL.      Allergies :    No Known Allergies  Intolerances - none      Social History:  Tobacco: negative  Alcohol: negative  Recent Travel: none  Immunizations: ?  Lives in his own home       MEDICATIONS  (STANDING):  aMIOdarone    Tablet 200 milliGRAM(s) Oral daily  amLODIPine   Tablet 10 milliGRAM(s) Oral daily  aspirin enteric coated 81 milliGRAM(s) Oral daily  atorvastatin 80 milliGRAM(s) Oral at bedtime  doxazosin 4 milliGRAM(s) Oral daily  heparin   Injectable 5000 Unit(s) SubCutaneous every 8 hours  melatonin 3 milliGRAM(s) Oral at bedtime  metoprolol tartrate 25 milliGRAM(s) Oral every 6 hours  pantoprazole    Tablet 40 milliGRAM(s) Oral before breakfast  senna 2 Tablet(s) Oral at bedtime  sodium chloride 0.45%. 1000 milliLiter(s) (75 mL/Hr) IV Continuous <Continuous>    MEDICATIONS  (PRN):  acetaminophen     Tablet .. 650 milliGRAM(s) Oral every 6 hours PRN Mild Pain (1 - 3)    LABS:  CBC Full  -  ( 18 Nov 2022 07:30 )  WBC Count : 10.70 K/uL  RBC Count : 4.29 M/uL  Hemoglobin : 11.9 g/dL  Hematocrit : 38.1 %  Platelet Count - Automated : 221 K/uL  Mean Cell Volume : 88.8 fl  Mean Cell Hemoglobin : 27.7 pg  Mean Cell Hemoglobin Concentration : 31.2 g/dL  Auto Neutrophil # : x  Auto Lymphocyte # : x  Auto Monocyte # : x  Auto Eosinophil # : x  Auto Basophil # : x  Auto Neutrophil % : x  Auto Lymphocyte % : x  Auto Monocyte % : x  Auto Eosinophil % : x  Auto Basophil % : x    11-18    144  |  113<H>  |  56<H>  ----------------------------<  120<H>  4.2   |  25  |  2.49<H>    Ca    9.4      18 Nov 2022 07:30    TPro  7.3  /  Alb  2.4<L>  /  TBili  0.6  /  DBili  x   /  AST  14<L>  /  ALT  17  /  AlkPhos  89  11-18    LIVER FUNCTIONS - ( 18 Nov 2022 07:30 )  Alb: 2.4 g/dL / Pro: 7.3 gm/dL / ALK PHOS: 89 U/L / ALT: 17 U/L / AST: 14 U/L / GGT: x           Sedimentation Rate, Erythrocyte: 79 mm/hr (11-18 @ 07:30)  Sedimentation Rate, Erythrocyte: 56 mm/hr (11-17 @ 07:25)    C-Reactive Protein, Serum (11.17.22 @ 08:00)    C-Reactive Protein, Serum: 136 mg/L    C-Reactive Protein, Serum (11.14.22 @ 19:50)    C-Reactive Protein, Serum: 38 mg/L    Uric Acid, Serum (11.18.22 @ 07:45)    Uric Acid, Serum: 8.1 mg/dL        MICROBIOLOGY:  Specimen Source: Clean Catch Clean Catch (Midstream) (11-13 @ 12:00)  Culture Results:   >100,000 CFU/ml Escherichia coli (11-13 @ 12:00)    -  Amikacin: S <=16    -  Amoxicillin/Clavulanic Acid: S <=8/4    -  Ampicillin: R >16 These ampicillin results predict results for amoxicillin    -  Ampicillin/Sulbactam: S 8/4 Enterobacter, Klebsiella aerogenes, Citrobacter, and Serratia may develop resistance during prolonged therapy (3-4 days)    -  Aztreonam: S <=4    -  Cefazolin: S <=2 For uncomplicated UTI with K. pneumoniae, E. coli, or P. mirablis: LUIS <=16 is sensitive and LUIS >=32 is resistant. This also predicts results for oral agents cefaclor, cefdinir, cefpodoxime, cefprozil, cefuroxime axetil, cephalexin and locarbef for uncomplicated UTI. Note that some isolates may be susceptible to these agents while testing resistant to cefazolin.    -  Cefepime: S <=2    -  Cefoxitin: S <=8    -  Ceftriaxone: S <=1 Enterobacter, Klebsiella aerogenes, Citrobacter, and Serratia may develop resistance during prolonged therapy    -  Ciprofloxacin: S <=0.25    -  Ertapenem: S <=0.5    -  Gentamicin: R >8    -  Imipenem: S <=1    -  Levofloxacin: S <=0.5    -  Meropenem: S <=1    -  Nitrofurantoin: S <=32 Should not be used to treat pyelonephritis    -  Piperacillin/Tazobactam: S <=8    -  Tobramycin: I 8    -  Trimethoprim/Sulfamethoxazole: R >2/38    Method Type: LUIS        Flu With COVID-19 By RALPH (11.13.22 @ 17:07)    SARS-CoV-2 Result: NotDetec: EUA/IVD    Influenza A Result: NotDetec: EUA/IVD    Influenza B Result: NotDetec: EUA/IVD      RADIOLOGY:  < from: US Kidney and Bladder (11.15.22 @ 11:07) >  ACC: [id number] EXAM:  US KIDNEYS AND BLADDER                        PROCEDURE DATE:  11/15/2022    INTERPRETATION:  CLINICAL INFORMATION: Urinary incontinence, Hx UTI.  COMPARISON: Abdominal ultrasound from 1/28/2018.  TECHNIQUE: Sonography of the kidneys and bladder.  FINDINGS:  Right kidney: 11.4 cm. Multiple simple renal cysts, largest of which   measures 2.2 cm in the lower pole. In addition there is a complex cyst   with septations in the lower pole and measures 1.9 x 1.5 x 2.2 cm and is   stable. There is a 0.7 cm nonobstructive renal calculus in the midpole   region. No hydronephrosis.  Left kidney: 10.0 cm. Multiple renal cysts, largest of which is in the   lower pole and measures 2.3 cm. No renal calculi or hydronephrosis.  Urinary bladder: Within normal limits.  Prostate: Enlarged. Measures 4.7 x 3.4 x 4.3 cm, with an estimated volume   of 45 mL.  IMPRESSION:  Bilateral renal cysts.  Nonobstructing right renal calculus.  Prostate gland enlargement.      < from: Xray Chest 1 View- PORTABLE-Urgent (Xray Chest 1 View- PORTABLE-Urgent .) (11.13.22 @ 11:41) >  ACC: [id number] EXAM:  XR CHEST PORTABLE URGENT 1V                        PROCEDURE DATE:  11/13/2022    INTERPRETATION:  AP chest.  CLINICAL INDICATION: Hypertension.  IMPRESSION: Cardiac silhouette is enlarged. The lungs are grossly clear.   Degenerative changes are noted of the bones.        Vital Signs Last 24 Hrs  T(C): 36.8 (18 Nov 2022 10:05), Max: 37 (17 Nov 2022 17:35)  T(F): 98.2 (18 Nov 2022 10:05), Max: 98.6 (17 Nov 2022 17:35)  HR: 72 (18 Nov 2022 11:45) (70 - 72)  BP: 152/74 (18 Nov 2022 11:45) (126/62 - 168/67)  BP(mean): 94 (18 Nov 2022 04:58) (94 - 101)  RR: 18 (18 Nov 2022 11:45) (18 - 19)  SpO2: 98% (18 Nov 2022 11:45) (95% - 98%)  Parameters below as of 18 Nov 2022 10:05  Patient On (Oxygen Delivery Method): room air  Supplemental O2: on RA    PHYSICAL EXAM    General: 89 y/o  white  male lethargic but arousable now, c/o pain in his Lt. Hand lying in bed now in NAD  HEENT: conj pink, sclerae anicteric, PERRLA, no oral lesions noted but mucosa currently dry  Neck: supple, no nodes noted  Heart: RR  Lungs: clear bilaterally  Abdomen: soft, BS+, nontender to palpation, no masses or HS-megaly detected  Back: no CVA or Spinal tenderness elicited to palpation  Extremities: no edema LE's                    no calf tenderness LE's                    Lt hand with marked area of swelling, warmth, and erythema  with on the dorsum of the hand with tenderness to palpation and limited ROM of the wrist and fingers now secondary to                        pain                      Arthritic changes of both hands noted  Skin: warm, dry no rash noted      I&O's Summary :     17 Nov 2022 07:01  -  18 Nov 2022 07:00  --------------------------------------------------------  IN: 250 mL / OUT: 1200 mL / NET: -950 mL        Impression: 89 y/o white male with hx of HTN. HLD, Paroxysmal AFib, s/p MI in 2018,     Now with Cellulitis/ ? early abscess of the Lt hand - ? etiology?    Suggestions:  Will therefore request Blood Cx's x 2 now and begin rx with Rocephin and vanco x 1 dose pending Cx's.  Await XRay of the Lt hand - may need to consider further w/u with CT or MRI of the Lt Hand to r/o early abscess formation.  Follow-up temps and labs closely.  Maintain Lt. Hand elevated.  Discussed with patient at the bedside.  Thanks, will follow with you. HPI:  89 y/o white male with hx of HTN, HLD, Paroxysmal A Fib, s/p MI in 2018, s/p AAA Repair with Endovascular Stent Grafting of  an Infrarenal AAA along with Bilateral Common Iliac Artery Stent Grafting about 10yrs. ago, Hemorrhoids, BPH with urinary incontinence, admitted via the ER to [facility] on 11/13/22 with c/o worsening urinary incontinence along with increased urinary frequency since   the early AM.  He denied any fevers or chills at that time and in the ER was found to be afebrile and with WBC's WNL.  W/u with U/A with Micro revealed marked pyuria with >50WBC's, >50 RBC's, and LE was positive.  Urine Cx ws obtained and he was begun empirically on Rocephin which was completed on 11/16/22 after Urine Cx subsequently grew >100.000 EColi which was sensitive to the Rocephin.  Further w/u with CXR was done and negative for any infiltrates and Renal Sono was done and reported with bilateral Renal Cysts, a Rt. Renal non-obstructing Calculus, and an enlarged Prostate.  Patient has remained afebrile but over the past few days has been noted to have increased ESR up to 79 today and increased CRP up to 167.  Also noted with slightly increased WBC's this AM to 10,700 and this AM patient began to c/o pain in his Lt. Hand.  He denies any hx of trauma and had one episode of ? Gout in his foot many yrs. ago, Patient also noted with elevated BUN/ Creat since admission consistent with CKD.  Noted Uric Acid done this AM is WNL.      Allergies :    No Known Allergies  Intolerances - none      Social History:  Tobacco: negative  Alcohol: negative  Recent Travel: none  Immunizations: ?  Lives alone in his own home  - no pets   Has a [facility] 5-6hrs./ day at home      MEDICATIONS  (STANDING):  aMIOdarone    Tablet 200 milliGRAM(s) Oral daily  amLODIPine   Tablet 10 milliGRAM(s) Oral daily  aspirin enteric coated 81 milliGRAM(s) Oral daily  atorvastatin 80 milliGRAM(s) Oral at bedtime  doxazosin 4 milliGRAM(s) Oral daily  heparin   Injectable 5000 Unit(s) SubCutaneous every 8 hours  melatonin 3 milliGRAM(s) Oral at bedtime  metoprolol tartrate 25 milliGRAM(s) Oral every 6 hours  pantoprazole    Tablet 40 milliGRAM(s) Oral before breakfast  senna 2 Tablet(s) Oral at bedtime  sodium chloride 0.45%. 1000 milliLiter(s) (75 mL/Hr) IV Continuous <Continuous>    MEDICATIONS  (PRN):  acetaminophen     Tablet .. 650 milliGRAM(s) Oral every 6 hours PRN Mild Pain (1 - 3)    LABS:  CBC Full  -  ( 18 Nov 2022 07:30 )  WBC Count : 10.70 K/uL  RBC Count : 4.29 M/uL  Hemoglobin : 11.9 g/dL  Hematocrit : 38.1 %  Platelet Count - Automated : 221 K/uL  Mean Cell Volume : 88.8 fl  Mean Cell Hemoglobin : 27.7 pg  Mean Cell Hemoglobin Concentration : 31.2 g/dL  Auto Neutrophil # : x  Auto Lymphocyte # : x  Auto Monocyte # : x  Auto Eosinophil # : x  Auto Basophil # : x  Auto Neutrophil % : x  Auto Lymphocyte % : x  Auto Monocyte % : x  Auto Eosinophil % : x  Auto Basophil % : x    11-18    144  |  113<H>  |  56<H>  ----------------------------<  120<H>  4.2   |  25  |  2.49<H>    Ca    9.4      18 Nov 2022 07:30    TPro  7.3  /  Alb  2.4<L>  /  TBili  0.6  /  DBili  x   /  AST  14<L>  /  ALT  17  /  AlkPhos  89  11-18    LIVER FUNCTIONS - ( 18 Nov 2022 07:30 )  Alb: 2.4 g/dL / Pro: 7.3 gm/dL / ALK PHOS: 89 U/L / ALT: 17 U/L / AST: 14 U/L / GGT: x           Sedimentation Rate, Erythrocyte: 79 mm/hr (11-18 @ 07:30)  Sedimentation Rate, Erythrocyte: 56 mm/hr (11-17 @ 07:25)    C-Reactive Protein, Serum (11.17.22 @ 08:00)    C-Reactive Protein, Serum: 136 mg/L    C-Reactive Protein, Serum (11.14.22 @ 19:50)    C-Reactive Protein, Serum: 38 mg/L    Uric Acid, Serum (11.18.22 @ 07:45)    Uric Acid, Serum: 8.1 mg/dL        MICROBIOLOGY:  Specimen Source: Clean Catch Clean Catch (Midstream) (11-13 @ 12:00)  Culture Results:   >100,000 CFU/ml Escherichia coli (11-13 @ 12:00)    -  Amikacin: S <=16    -  Amoxicillin/Clavulanic Acid: S <=8/4    -  Ampicillin: R >16 These ampicillin results predict results for amoxicillin    -  Ampicillin/Sulbactam: S 8/4 Enterobacter, Klebsiella aerogenes, Citrobacter, and Serratia may develop resistance during prolonged therapy (3-4 days)    -  Aztreonam: S <=4    -  Cefazolin: S <=2 For uncomplicated UTI with K. pneumoniae, E. coli, or P. mirablis: LUIS <=16 is sensitive and LUIS >=32 is resistant. This also predicts results for oral agents cefaclor, cefdinir, cefpodoxime, cefprozil, cefuroxime axetil, cephalexin and locarbef for uncomplicated UTI. Note that some isolates may be susceptible to these agents while testing resistant to cefazolin.    -  Cefepime: S <=2    -  Cefoxitin: S <=8    -  Ceftriaxone: S <=1 Enterobacter, Klebsiella aerogenes, Citrobacter, and Serratia may develop resistance during prolonged therapy    -  Ciprofloxacin: S <=0.25    -  Ertapenem: S <=0.5    -  Gentamicin: R >8    -  Imipenem: S <=1    -  Levofloxacin: S <=0.5    -  Meropenem: S <=1    -  Nitrofurantoin: S <=32 Should not be used to treat pyelonephritis    -  Piperacillin/Tazobactam: S <=8    -  Tobramycin: I 8    -  Trimethoprim/Sulfamethoxazole: R >2/38    Method Type: LUIS        Flu With COVID-19 By RALPH (11.13.22 @ 17:07)    SARS-CoV-2 Result: NotDetec: EUA/IVD    Influenza A Result: NotDetec: EUA/IVD    Influenza B Result: NotDetec: EUA/IVD      RADIOLOGY:  < from: US Kidney and Bladder (11.15.22 @ 11:07) >  ACC: [id number] EXAM:  US KIDNEYS AND BLADDER                        PROCEDURE DATE:  11/15/2022    INTERPRETATION:  CLINICAL INFORMATION: Urinary incontinence, Hx UTI.  COMPARISON: Abdominal ultrasound from 1/28/2018.  TECHNIQUE: Sonography of the kidneys and bladder.  FINDINGS:  Right kidney: 11.4 cm. Multiple simple renal cysts, largest of which   measures 2.2 cm in the lower pole. In addition there is a complex cyst   with septations in the lower pole and measures 1.9 x 1.5 x 2.2 cm and is   stable. There is a 0.7 cm nonobstructive renal calculus in the midpole   region. No hydronephrosis.  Left kidney: 10.0 cm. Multiple renal cysts, largest of which is in the   lower pole and measures 2.3 cm. No renal calculi or hydronephrosis.  Urinary bladder: Within normal limits.  Prostate: Enlarged. Measures 4.7 x 3.4 x 4.3 cm, with an estimated volume   of 45 mL.  IMPRESSION:  Bilateral renal cysts.  Nonobstructing right renal calculus.  Prostate gland enlargement.      < from: Xray Chest 1 View- PORTABLE-Urgent (Xray Chest 1 View- PORTABLE-Urgent .) (11.13.22 @ 11:41) >  ACC: [id number] EXAM:  XR CHEST PORTABLE URGENT 1V                        PROCEDURE DATE:  11/13/2022    INTERPRETATION:  AP chest.  CLINICAL INDICATION: Hypertension.  IMPRESSION: Cardiac silhouette is enlarged. The lungs are grossly clear.   Degenerative changes are noted of the bones.        Vital Signs Last 24 Hrs  T(C): 36.8 (18 Nov 2022 10:05), Max: 37 (17 Nov 2022 17:35)  T(F): 98.2 (18 Nov 2022 10:05), Max: 98.6 (17 Nov 2022 17:35)  HR: 72 (18 Nov 2022 11:45) (70 - 72)  BP: 152/74 (18 Nov 2022 11:45) (126/62 - 168/67)  BP(mean): 94 (18 Nov 2022 04:58) (94 - 101)  RR: 18 (18 Nov 2022 11:45) (18 - 19)  SpO2: 98% (18 Nov 2022 11:45) (95% - 98%)  Parameters below as of 18 Nov 2022 10:05  Patient On (Oxygen Delivery Method): room air  Supplemental O2: on RA    PHYSICAL EXAM    General: 89 y/o  white  male lethargic but arousable now, c/o pain in his Lt. Hand lying in bed now in NAD  HEENT: conj pink, sclerae anicteric, PERRLA, no oral lesions noted but mucosa currently dry  Neck: supple, no nodes noted  Heart: RR  Lungs: clear bilaterally  Abdomen: soft, BS+, nontender to palpation, no masses or HS-megaly detected  Back: no CVA or Spinal tenderness elicited to palpation  Extremities: no edema LE's                    no calf tenderness LE's                    Lt hand with marked area of swelling, warmth, and erythema  with on the dorsum of the hand with tenderness to palpation and limited ROM of the wrist and fingers now secondary to                        pain                      Arthritic changes of both hands noted  Skin: warm, dry no rash noted      I&O's Summary :     17 Nov 2022 07:01  -  18 Nov 2022 07:00  --------------------------------------------------------  IN: 250 mL / OUT: 1200 mL / NET: -950 mL        Impression: 89 y/o white male with hx of HTN. HLD, Paroxysmal AFib, s/p MI in 2018,, s/p AAA Repair with Endovascular Stent Grafting of  an Infrarenal AAA along with Bilateral Common Iliac Artery Stent Grafting about 10yrs. ago, Hemorrhoids, BPH with urinary incontinence, admitted via the ER to [facility] on 11/13/22 with c/o worsening urinary incontinence along with increased urinary frequency since the early AM.  He denied any fevers or chills at that time and in the ER was found to be afebrile and with WBC's WNL.  W/u with U/A with Micro revealed marked pyuria with >50WBC's, >50 RBC's, and LE was positive.  Urine Cx ws obtained and he was begun empirically on Rocephin which was completed on 11/16/22 after Urine  Cx  subsequently grew >100.000 EColi which was sensitive to the Rocephin.  Further w/u with CXR was done and negative for any infiltrates and  Renal Sono was done and reported with bilateral Renal Cysts, a Rt. Renal non-obstructing Calculus, and an enlarged Prostate.  Patient has remained afebrile but over the past few days has been noted to have increased ESR up to 79  this AM and increased CRP up to 167.  Also noted with slightly increased WBC's to 10,700  this AM and the patient began to c/o pain in his Lt. Hand. He denies any hx of trauma and ? had 1 prior episode of Gout in his foot many yrs. ago.  Patient also noted with elevated BUN/ Creat since admission consistent with CKD.  Noted Uric Acid done this AM is WNL. Now with Cellulitis/ ? early abscess of the Lt hand - ? etiology?    Suggestions:  Will therefore request Blood Cx's x 2 now and begin rx with Rocephin and Vanco x 1 dose pending Cx's.  Await XRay of the Lt hand - may need to consider further w/u with CT or MRI of the Lt Hand to r/o early abscess formation.  Follow-up temps and labs closely.  Maintain Lt. Hand elevated.  Discussed with patient at the bedside.  Thanks, will follow with you.

## 2022-11-19 LAB
ALBUMIN SERPL ELPH-MCNC: 2.3 G/DL — LOW (ref 3.3–5)
ALP SERPL-CCNC: 81 U/L — SIGNIFICANT CHANGE UP (ref 40–120)
ALT FLD-CCNC: 23 U/L — SIGNIFICANT CHANGE UP (ref 12–78)
ANION GAP SERPL CALC-SCNC: 8 MMOL/L — SIGNIFICANT CHANGE UP (ref 5–17)
AST SERPL-CCNC: 15 U/L — SIGNIFICANT CHANGE UP (ref 15–37)
BASOPHILS # BLD AUTO: 0.05 K/UL — SIGNIFICANT CHANGE UP (ref 0–0.2)
BASOPHILS NFR BLD AUTO: 0.6 % — SIGNIFICANT CHANGE UP (ref 0–2)
BILIRUB SERPL-MCNC: 0.4 MG/DL — SIGNIFICANT CHANGE UP (ref 0.2–1.2)
BUN SERPL-MCNC: 66 MG/DL — HIGH (ref 7–23)
CALCIUM SERPL-MCNC: 9.2 MG/DL — SIGNIFICANT CHANGE UP (ref 8.5–10.1)
CHLORIDE SERPL-SCNC: 115 MMOL/L — HIGH (ref 96–108)
CO2 SERPL-SCNC: 21 MMOL/L — LOW (ref 22–31)
CREAT SERPL-MCNC: 2.72 MG/DL — HIGH (ref 0.5–1.3)
CRP SERPL-MCNC: 150 MG/L — HIGH
EGFR: 22 ML/MIN/1.73M2 — LOW
EOSINOPHIL # BLD AUTO: 0.14 K/UL — SIGNIFICANT CHANGE UP (ref 0–0.5)
EOSINOPHIL NFR BLD AUTO: 1.6 % — SIGNIFICANT CHANGE UP (ref 0–6)
ERYTHROCYTE [SEDIMENTATION RATE] IN BLOOD: 88 MM/HR — HIGH (ref 0–20)
GLUCOSE SERPL-MCNC: 135 MG/DL — HIGH (ref 70–99)
HCT VFR BLD CALC: 34.2 % — LOW (ref 39–50)
HGB BLD-MCNC: 10.8 G/DL — LOW (ref 13–17)
IMM GRANULOCYTES NFR BLD AUTO: 0.8 % — SIGNIFICANT CHANGE UP (ref 0–0.9)
LYMPHOCYTES # BLD AUTO: 1.24 K/UL — SIGNIFICANT CHANGE UP (ref 1–3.3)
LYMPHOCYTES # BLD AUTO: 14.2 % — SIGNIFICANT CHANGE UP (ref 13–44)
MCHC RBC-ENTMCNC: 28 PG — SIGNIFICANT CHANGE UP (ref 27–34)
MCHC RBC-ENTMCNC: 31.6 G/DL — LOW (ref 32–36)
MCV RBC AUTO: 88.6 FL — SIGNIFICANT CHANGE UP (ref 80–100)
MONOCYTES # BLD AUTO: 0.55 K/UL — SIGNIFICANT CHANGE UP (ref 0–0.9)
MONOCYTES NFR BLD AUTO: 6.3 % — SIGNIFICANT CHANGE UP (ref 2–14)
NEUTROPHILS # BLD AUTO: 6.68 K/UL — SIGNIFICANT CHANGE UP (ref 1.8–7.4)
NEUTROPHILS NFR BLD AUTO: 76.5 % — SIGNIFICANT CHANGE UP (ref 43–77)
NRBC # BLD: 0 /100 WBCS — SIGNIFICANT CHANGE UP (ref 0–0)
PLATELET # BLD AUTO: 234 K/UL — SIGNIFICANT CHANGE UP (ref 150–400)
POTASSIUM SERPL-MCNC: 4.2 MMOL/L — SIGNIFICANT CHANGE UP (ref 3.5–5.3)
POTASSIUM SERPL-SCNC: 4.2 MMOL/L — SIGNIFICANT CHANGE UP (ref 3.5–5.3)
PROCALCITONIN SERPL-MCNC: 0.41 NG/ML — HIGH (ref 0.02–0.1)
PROT SERPL-MCNC: 6.8 GM/DL — SIGNIFICANT CHANGE UP (ref 6–8.3)
RBC # BLD: 3.86 M/UL — LOW (ref 4.2–5.8)
RBC # FLD: 15.3 % — HIGH (ref 10.3–14.5)
SODIUM SERPL-SCNC: 144 MMOL/L — SIGNIFICANT CHANGE UP (ref 135–145)
VANCOMYCIN TROUGH SERPL-MCNC: 9.4 UG/ML — LOW (ref 10–20)
WBC # BLD: 8.73 K/UL — SIGNIFICANT CHANGE UP (ref 3.8–10.5)
WBC # FLD AUTO: 8.73 K/UL — SIGNIFICANT CHANGE UP (ref 3.8–10.5)

## 2022-11-19 RX ORDER — VANCOMYCIN HCL 1 G
1000 VIAL (EA) INTRAVENOUS ONCE
Refills: 0 | Status: COMPLETED | OUTPATIENT
Start: 2022-11-19 | End: 2022-11-19

## 2022-11-19 RX ORDER — SODIUM CHLORIDE 9 MG/ML
1000 INJECTION, SOLUTION INTRAVENOUS
Refills: 0 | Status: DISCONTINUED | OUTPATIENT
Start: 2022-11-19 | End: 2022-11-22

## 2022-11-19 RX ADMIN — Medication 1 TABLET(S): at 05:53

## 2022-11-19 RX ADMIN — Medication 25 MILLIGRAM(S): at 18:07

## 2022-11-19 RX ADMIN — Medication 25 MILLIGRAM(S): at 05:52

## 2022-11-19 RX ADMIN — Medication 3 MILLIGRAM(S): at 22:05

## 2022-11-19 RX ADMIN — Medication 650 MILLIGRAM(S): at 12:00

## 2022-11-19 RX ADMIN — SODIUM CHLORIDE 60 MILLILITER(S): 9 INJECTION, SOLUTION INTRAVENOUS at 23:49

## 2022-11-19 RX ADMIN — AMIODARONE HYDROCHLORIDE 200 MILLIGRAM(S): 400 TABLET ORAL at 05:52

## 2022-11-19 RX ADMIN — HEPARIN SODIUM 5000 UNIT(S): 5000 INJECTION INTRAVENOUS; SUBCUTANEOUS at 05:52

## 2022-11-19 RX ADMIN — Medication 650 MILLIGRAM(S): at 11:10

## 2022-11-19 RX ADMIN — SENNA PLUS 2 TABLET(S): 8.6 TABLET ORAL at 22:05

## 2022-11-19 RX ADMIN — Medication 81 MILLIGRAM(S): at 11:10

## 2022-11-19 RX ADMIN — CEFTRIAXONE 100 MILLIGRAM(S): 500 INJECTION, POWDER, FOR SOLUTION INTRAMUSCULAR; INTRAVENOUS at 18:07

## 2022-11-19 RX ADMIN — HEPARIN SODIUM 5000 UNIT(S): 5000 INJECTION INTRAVENOUS; SUBCUTANEOUS at 15:02

## 2022-11-19 RX ADMIN — Medication 250 MILLIGRAM(S): at 18:42

## 2022-11-19 RX ADMIN — Medication 25 MILLIGRAM(S): at 11:10

## 2022-11-19 RX ADMIN — AMLODIPINE BESYLATE 10 MILLIGRAM(S): 2.5 TABLET ORAL at 05:53

## 2022-11-19 RX ADMIN — PANTOPRAZOLE SODIUM 40 MILLIGRAM(S): 20 TABLET, DELAYED RELEASE ORAL at 06:00

## 2022-11-19 RX ADMIN — HEPARIN SODIUM 5000 UNIT(S): 5000 INJECTION INTRAVENOUS; SUBCUTANEOUS at 22:04

## 2022-11-19 RX ADMIN — Medication 1 TABLET(S): at 18:07

## 2022-11-19 RX ADMIN — ATORVASTATIN CALCIUM 80 MILLIGRAM(S): 80 TABLET, FILM COATED ORAL at 22:05

## 2022-11-19 RX ADMIN — Medication 4 MILLIGRAM(S): at 05:52

## 2022-11-19 RX ADMIN — Medication 25 MILLIGRAM(S): at 23:06

## 2022-11-19 NOTE — PHARMACOTHERAPY INTERVENTION NOTE - COMMENTS
Spoke to MD and wants to continue with one more dose of vancomycin. MD aware scr is 2.72 and received a dose yesterday. MD is waiting for cultures.
Recommended to increase heparin 5000 subcutaneously twice a day to heparin 5000 units subcutaneously every 8 hours based on patient's weight and BMI

## 2022-11-20 LAB
ALBUMIN SERPL ELPH-MCNC: 2.3 G/DL — LOW (ref 3.3–5)
ALP SERPL-CCNC: 104 U/L — SIGNIFICANT CHANGE UP (ref 40–120)
ALT FLD-CCNC: 40 U/L — SIGNIFICANT CHANGE UP (ref 12–78)
ANION GAP SERPL CALC-SCNC: 7 MMOL/L — SIGNIFICANT CHANGE UP (ref 5–17)
AST SERPL-CCNC: 40 U/L — HIGH (ref 15–37)
BILIRUB SERPL-MCNC: 0.2 MG/DL — SIGNIFICANT CHANGE UP (ref 0.2–1.2)
BUN SERPL-MCNC: 76 MG/DL — HIGH (ref 7–23)
CALCIUM SERPL-MCNC: 9.1 MG/DL — SIGNIFICANT CHANGE UP (ref 8.5–10.1)
CHLORIDE SERPL-SCNC: 114 MMOL/L — HIGH (ref 96–108)
CO2 SERPL-SCNC: 21 MMOL/L — LOW (ref 22–31)
CREAT SERPL-MCNC: 2.78 MG/DL — HIGH (ref 0.5–1.3)
CRP SERPL-MCNC: 86 MG/L — HIGH
EGFR: 21 ML/MIN/1.73M2 — LOW
ERYTHROCYTE [SEDIMENTATION RATE] IN BLOOD: 87 MM/HR — HIGH (ref 0–20)
GLUCOSE SERPL-MCNC: 145 MG/DL — HIGH (ref 70–99)
HCT VFR BLD CALC: 34.8 % — LOW (ref 39–50)
HGB BLD-MCNC: 10.7 G/DL — LOW (ref 13–17)
MCHC RBC-ENTMCNC: 27.9 PG — SIGNIFICANT CHANGE UP (ref 27–34)
MCHC RBC-ENTMCNC: 30.7 G/DL — LOW (ref 32–36)
MCV RBC AUTO: 90.9 FL — SIGNIFICANT CHANGE UP (ref 80–100)
NRBC # BLD: 0 /100 WBCS — SIGNIFICANT CHANGE UP (ref 0–0)
PLATELET # BLD AUTO: 260 K/UL — SIGNIFICANT CHANGE UP (ref 150–400)
POTASSIUM SERPL-MCNC: 4.3 MMOL/L — SIGNIFICANT CHANGE UP (ref 3.5–5.3)
POTASSIUM SERPL-SCNC: 4.3 MMOL/L — SIGNIFICANT CHANGE UP (ref 3.5–5.3)
PROT SERPL-MCNC: 7.1 GM/DL — SIGNIFICANT CHANGE UP (ref 6–8.3)
RBC # BLD: 3.83 M/UL — LOW (ref 4.2–5.8)
RBC # FLD: 15.3 % — HIGH (ref 10.3–14.5)
SODIUM SERPL-SCNC: 142 MMOL/L — SIGNIFICANT CHANGE UP (ref 135–145)
WBC # BLD: 7.87 K/UL — SIGNIFICANT CHANGE UP (ref 3.8–10.5)
WBC # FLD AUTO: 7.87 K/UL — SIGNIFICANT CHANGE UP (ref 3.8–10.5)

## 2022-11-20 PROCEDURE — 93306 TTE W/DOPPLER COMPLETE: CPT | Mod: 26

## 2022-11-20 RX ADMIN — HEPARIN SODIUM 5000 UNIT(S): 5000 INJECTION INTRAVENOUS; SUBCUTANEOUS at 05:34

## 2022-11-20 RX ADMIN — AMLODIPINE BESYLATE 10 MILLIGRAM(S): 2.5 TABLET ORAL at 05:30

## 2022-11-20 RX ADMIN — CEFTRIAXONE 100 MILLIGRAM(S): 500 INJECTION, POWDER, FOR SOLUTION INTRAMUSCULAR; INTRAVENOUS at 17:25

## 2022-11-20 RX ADMIN — Medication 25 MILLIGRAM(S): at 12:29

## 2022-11-20 RX ADMIN — Medication 1 TABLET(S): at 05:30

## 2022-11-20 RX ADMIN — HEPARIN SODIUM 5000 UNIT(S): 5000 INJECTION INTRAVENOUS; SUBCUTANEOUS at 21:31

## 2022-11-20 RX ADMIN — Medication 25 MILLIGRAM(S): at 05:30

## 2022-11-20 RX ADMIN — AMIODARONE HYDROCHLORIDE 200 MILLIGRAM(S): 400 TABLET ORAL at 05:30

## 2022-11-20 RX ADMIN — Medication 3 MILLIGRAM(S): at 21:30

## 2022-11-20 RX ADMIN — Medication 25 MILLIGRAM(S): at 17:25

## 2022-11-20 RX ADMIN — Medication 4 MILLIGRAM(S): at 05:30

## 2022-11-20 RX ADMIN — Medication 1 TABLET(S): at 17:25

## 2022-11-20 RX ADMIN — HEPARIN SODIUM 5000 UNIT(S): 5000 INJECTION INTRAVENOUS; SUBCUTANEOUS at 14:49

## 2022-11-20 RX ADMIN — PANTOPRAZOLE SODIUM 40 MILLIGRAM(S): 20 TABLET, DELAYED RELEASE ORAL at 06:16

## 2022-11-20 RX ADMIN — Medication 81 MILLIGRAM(S): at 12:28

## 2022-11-20 RX ADMIN — ATORVASTATIN CALCIUM 80 MILLIGRAM(S): 80 TABLET, FILM COATED ORAL at 21:30

## 2022-11-21 LAB
ALBUMIN SERPL ELPH-MCNC: 2.7 G/DL — LOW (ref 3.3–5)
ALP SERPL-CCNC: 98 U/L — SIGNIFICANT CHANGE UP (ref 40–120)
ALT FLD-CCNC: 41 U/L — SIGNIFICANT CHANGE UP (ref 12–78)
ANION GAP SERPL CALC-SCNC: 8 MMOL/L — SIGNIFICANT CHANGE UP (ref 5–17)
AST SERPL-CCNC: 27 U/L — SIGNIFICANT CHANGE UP (ref 15–37)
BASOPHILS # BLD AUTO: 0.06 K/UL — SIGNIFICANT CHANGE UP (ref 0–0.2)
BASOPHILS NFR BLD AUTO: 0.7 % — SIGNIFICANT CHANGE UP (ref 0–2)
BILIRUB SERPL-MCNC: 0.3 MG/DL — SIGNIFICANT CHANGE UP (ref 0.2–1.2)
BUN SERPL-MCNC: 86 MG/DL — HIGH (ref 7–23)
CALCIUM SERPL-MCNC: 9.5 MG/DL — SIGNIFICANT CHANGE UP (ref 8.5–10.1)
CHLORIDE SERPL-SCNC: 114 MMOL/L — HIGH (ref 96–108)
CO2 SERPL-SCNC: 21 MMOL/L — LOW (ref 22–31)
CREAT SERPL-MCNC: 3.04 MG/DL — HIGH (ref 0.5–1.3)
CRP SERPL-MCNC: 52 MG/L — HIGH
EGFR: 19 ML/MIN/1.73M2 — LOW
EOSINOPHIL # BLD AUTO: 0.17 K/UL — SIGNIFICANT CHANGE UP (ref 0–0.5)
EOSINOPHIL NFR BLD AUTO: 2.1 % — SIGNIFICANT CHANGE UP (ref 0–6)
ERYTHROCYTE [SEDIMENTATION RATE] IN BLOOD: 85 MM/HR — HIGH (ref 0–20)
GLUCOSE SERPL-MCNC: 113 MG/DL — HIGH (ref 70–99)
HCT VFR BLD CALC: 36.8 % — LOW (ref 39–50)
HGB BLD-MCNC: 11.2 G/DL — LOW (ref 13–17)
IMM GRANULOCYTES NFR BLD AUTO: 0.7 % — SIGNIFICANT CHANGE UP (ref 0–0.9)
LYMPHOCYTES # BLD AUTO: 1.17 K/UL — SIGNIFICANT CHANGE UP (ref 1–3.3)
LYMPHOCYTES # BLD AUTO: 14.5 % — SIGNIFICANT CHANGE UP (ref 13–44)
MCHC RBC-ENTMCNC: 27.8 PG — SIGNIFICANT CHANGE UP (ref 27–34)
MCHC RBC-ENTMCNC: 30.4 G/DL — LOW (ref 32–36)
MCV RBC AUTO: 91.3 FL — SIGNIFICANT CHANGE UP (ref 80–100)
MONOCYTES # BLD AUTO: 0.44 K/UL — SIGNIFICANT CHANGE UP (ref 0–0.9)
MONOCYTES NFR BLD AUTO: 5.4 % — SIGNIFICANT CHANGE UP (ref 2–14)
NEUTROPHILS # BLD AUTO: 6.19 K/UL — SIGNIFICANT CHANGE UP (ref 1.8–7.4)
NEUTROPHILS NFR BLD AUTO: 76.6 % — SIGNIFICANT CHANGE UP (ref 43–77)
NRBC # BLD: 0 /100 WBCS — SIGNIFICANT CHANGE UP (ref 0–0)
PLATELET # BLD AUTO: 286 K/UL — SIGNIFICANT CHANGE UP (ref 150–400)
POTASSIUM SERPL-MCNC: 4.7 MMOL/L — SIGNIFICANT CHANGE UP (ref 3.5–5.3)
POTASSIUM SERPL-SCNC: 4.7 MMOL/L — SIGNIFICANT CHANGE UP (ref 3.5–5.3)
PROCALCITONIN SERPL-MCNC: 0.21 NG/ML — HIGH (ref 0.02–0.1)
PROT SERPL-MCNC: 7.5 GM/DL — SIGNIFICANT CHANGE UP (ref 6–8.3)
RBC # BLD: 4.03 M/UL — LOW (ref 4.2–5.8)
RBC # FLD: 15.4 % — HIGH (ref 10.3–14.5)
SODIUM SERPL-SCNC: 143 MMOL/L — SIGNIFICANT CHANGE UP (ref 135–145)
WBC # BLD: 8.09 K/UL — SIGNIFICANT CHANGE UP (ref 3.8–10.5)
WBC # FLD AUTO: 8.09 K/UL — SIGNIFICANT CHANGE UP (ref 3.8–10.5)

## 2022-11-21 RX ORDER — MAGNESIUM HYDROXIDE 400 MG/1
30 TABLET, CHEWABLE ORAL ONCE
Refills: 0 | Status: COMPLETED | OUTPATIENT
Start: 2022-11-21 | End: 2022-11-21

## 2022-11-21 RX ADMIN — HEPARIN SODIUM 5000 UNIT(S): 5000 INJECTION INTRAVENOUS; SUBCUTANEOUS at 12:48

## 2022-11-21 RX ADMIN — Medication 3 MILLIGRAM(S): at 21:29

## 2022-11-21 RX ADMIN — Medication 25 MILLIGRAM(S): at 05:30

## 2022-11-21 RX ADMIN — Medication 81 MILLIGRAM(S): at 12:49

## 2022-11-21 RX ADMIN — Medication 1 TABLET(S): at 05:32

## 2022-11-21 RX ADMIN — HEPARIN SODIUM 5000 UNIT(S): 5000 INJECTION INTRAVENOUS; SUBCUTANEOUS at 05:29

## 2022-11-21 RX ADMIN — Medication 4 MILLIGRAM(S): at 05:30

## 2022-11-21 RX ADMIN — SODIUM CHLORIDE 60 MILLILITER(S): 9 INJECTION, SOLUTION INTRAVENOUS at 05:31

## 2022-11-21 RX ADMIN — SODIUM CHLORIDE 60 MILLILITER(S): 9 INJECTION, SOLUTION INTRAVENOUS at 12:53

## 2022-11-21 RX ADMIN — CEFTRIAXONE 100 MILLIGRAM(S): 500 INJECTION, POWDER, FOR SOLUTION INTRAMUSCULAR; INTRAVENOUS at 18:04

## 2022-11-21 RX ADMIN — Medication 25 MILLIGRAM(S): at 18:04

## 2022-11-21 RX ADMIN — HEPARIN SODIUM 5000 UNIT(S): 5000 INJECTION INTRAVENOUS; SUBCUTANEOUS at 21:28

## 2022-11-21 RX ADMIN — Medication 25 MILLIGRAM(S): at 21:28

## 2022-11-21 RX ADMIN — PANTOPRAZOLE SODIUM 40 MILLIGRAM(S): 20 TABLET, DELAYED RELEASE ORAL at 12:49

## 2022-11-21 RX ADMIN — Medication 25 MILLIGRAM(S): at 01:03

## 2022-11-21 RX ADMIN — ATORVASTATIN CALCIUM 80 MILLIGRAM(S): 80 TABLET, FILM COATED ORAL at 21:28

## 2022-11-21 RX ADMIN — MAGNESIUM HYDROXIDE 30 MILLILITER(S): 400 TABLET, CHEWABLE ORAL at 14:54

## 2022-11-21 RX ADMIN — AMLODIPINE BESYLATE 10 MILLIGRAM(S): 2.5 TABLET ORAL at 05:31

## 2022-11-21 RX ADMIN — AMIODARONE HYDROCHLORIDE 200 MILLIGRAM(S): 400 TABLET ORAL at 05:30

## 2022-11-21 RX ADMIN — Medication 1 TABLET(S): at 18:04

## 2022-11-21 NOTE — CHART NOTE - NSCHARTNOTEFT_GEN_A_CORE
Per chart pt with PMH HTN, HLD, AAA repair, urinary incontinence, and MI, presents due to worsening urinary incontinence, found with UTI.       Factors impacting intake: [ ] none [ ] nausea  [ ] vomiting [ ] diarrhea [ ] constipation  [ ]chewing problems [ ] swallowing issues  [ ] other:     Diet Prescription: Diet, Regular:   Low Sodium  Supplement Feeding Modality:  Oral  Ensure Clear Cans or Servings Per Day:  1       Frequency:  Three Times a day (11-17-22 @ 15:55)    Intake: 0-75% as per flow sheets.    Current Weight: Weight (kg): (11/19) 83.5kg (11/14) 83kg  % Weight Change: Weight stable as per flow sheets    No noted edema as per flow sheets.     Pertinent Medications: MEDICATIONS  (STANDING):  aMIOdarone    Tablet 200 milliGRAM(s) Oral daily  amLODIPine   Tablet 10 milliGRAM(s) Oral daily  aspirin enteric coated 81 milliGRAM(s) Oral daily  atorvastatin 80 milliGRAM(s) Oral at bedtime  cefTRIAXone   IVPB 1000 milliGRAM(s) IV Intermittent every 24 hours  cefTRIAXone   IVPB      doxazosin 4 milliGRAM(s) Oral daily  heparin   Injectable 5000 Unit(s) SubCutaneous every 8 hours  lactobacillus acidophilus 1 Tablet(s) Oral every 12 hours  melatonin 3 milliGRAM(s) Oral at bedtime  metoprolol tartrate 25 milliGRAM(s) Oral every 6 hours  pantoprazole    Tablet 40 milliGRAM(s) Oral before breakfast  senna 2 Tablet(s) Oral at bedtime  sodium chloride 0.45%. 1000 milliLiter(s) (60 mL/Hr) IV Continuous <Continuous>    MEDICATIONS  (PRN):  acetaminophen     Tablet .. 650 milliGRAM(s) Oral every 6 hours PRN Mild Pain (1 - 3)    Pertinent Labs: 11-21 Na143 mmol/L Glu 113 mg/dL<H> K+ 4.7 mmol/L Cr  3.04 mg/dL<H> BUN 86 mg/dL<H> 11-21 Alb 2.7 g/dL<L>      Skin: no pressure injuries per flow sheets    Estimated Needs:   [x] no change since previous assessment: 11/17/22  [ ] recalculated:     Previous Nutrition Diagnosis:   [x]  Malnutrition	severe malnutrition in context of acute illness    Etiology	inadequate protein-energy intake in setting of UTI, CARLOS    Signs/Symptoms	<50% nutrition needs > 5 days, visible physical findings of moderate muscle loss as noted on 11/17    Goal/Expected Outcome	pt to consume >50-75% of meals & supplement    Nutrition Diagnosis is [ ] ongoing  [ ] resolved [ ] not applicable     New Nutrition Diagnosis: [ ] not applicable      Interventions:   Recommend  [ ] Change Diet To:  [ ] Nutrition Supplement  [ ] Nutrition Support  [ ] Other:     Monitoring and Evaluation:   [x] PO intake [ x ] Tolerance to diet prescription [ x ] weights [ x ] labs[ x ] follow up per protocol  [ ] other: Per chart pt with PMH HTN, HLD, AAA repair, urinary incontinence, and MI, presents due to worsening urinary incontinence, found with UTI. Discharge planning to rehab in progress.    Factors impacting intake: [ ] none [ ] nausea  [ ] vomiting [ ] diarrhea [ ] constipation  [ ]chewing problems [ ] swallowing issues  [x] other: variable appetite     Diet Prescription: Diet, Regular:   Low Sodium  Supplement Feeding Modality:  Oral  Ensure Clear Cans or Servings Per Day:  1       Frequency:  Three Times a day (11-17-22 @ 15:55)    Intake: 0-75% as per flow sheets. PCA reports pt consumed >75% breakfast today (11/21). PCA reports pt does not like Ensure Clear and prefers chocolate Ensure. Pt in bed at time of visit with untouched lunch at bedside- encouraged pt to consume lunch and he refused at this time.     Current Weight: Weight (kg): (11/19) 83.5kg (11/14) 83kg  % Weight Change: Weight stable as per flow sheets    No noted edema as per flow sheets.     Pertinent Medications: MEDICATIONS  (STANDING):  aMIOdarone    Tablet 200 milliGRAM(s) Oral daily  amLODIPine   Tablet 10 milliGRAM(s) Oral daily  aspirin enteric coated 81 milliGRAM(s) Oral daily  atorvastatin 80 milliGRAM(s) Oral at bedtime  cefTRIAXone   IVPB 1000 milliGRAM(s) IV Intermittent every 24 hours  cefTRIAXone   IVPB      doxazosin 4 milliGRAM(s) Oral daily  heparin   Injectable 5000 Unit(s) SubCutaneous every 8 hours  lactobacillus acidophilus 1 Tablet(s) Oral every 12 hours  melatonin 3 milliGRAM(s) Oral at bedtime  metoprolol tartrate 25 milliGRAM(s) Oral every 6 hours  pantoprazole    Tablet 40 milliGRAM(s) Oral before breakfast  senna 2 Tablet(s) Oral at bedtime  sodium chloride 0.45%. 1000 milliLiter(s) (60 mL/Hr) IV Continuous <Continuous>    MEDICATIONS  (PRN):  acetaminophen     Tablet .. 650 milliGRAM(s) Oral every 6 hours PRN Mild Pain (1 - 3)    Pertinent Labs: 11-21 Na143 mmol/L Glu 113 mg/dL<H> K+ 4.7 mmol/L Cr  3.04 mg/dL<H> BUN 86 mg/dL<H> 11-21 Alb 2.7 g/dL<L>      Skin: no pressure injuries per flow sheets    Estimated Needs:   [x] no change since previous assessment: 11/17/22  [ ] recalculated:     Previous Nutrition Diagnosis:   [x]  Malnutrition	severe malnutrition in context of acute illness    Etiology	inadequate protein-energy intake in setting of UTI, CARLOS    Signs/Symptoms	<50% nutrition needs > 5 days, visible physical findings of moderate muscle loss as noted on 11/17    Goal/Expected Outcome	pt to consume >50-75% of meals & supplement (not consistently met)    Nutrition Diagnosis is [x] ongoing  [ ] resolved [ ] not applicable     New Nutrition Diagnosis: [x] not applicable      Interventions:   Recommend  [x] Continue current diet as ordered  [ ] Change Diet To:  [x] Nutrition Supplement: Add Ensure Plus High Protein x 2/day (provides 700 kcal, 40 g protein) and discontinue Ensure Clear x 3/day (provides 720 kcal, 24 g protein)   [ ] Nutrition Support  [x] Other: Continue to provide assistance and encouragement with PO intake     Monitoring and Evaluation:   [x] PO intake [ x ] Tolerance to diet prescription [ x ] weights [ x ] labs[ x ] follow up per protocol  [ ] other:

## 2022-11-22 ENCOUNTER — TRANSCRIPTION ENCOUNTER (OUTPATIENT)
Age: 87
End: 2022-11-22

## 2022-11-22 VITALS
TEMPERATURE: 99 F | HEART RATE: 60 BPM | RESPIRATION RATE: 18 BRPM | OXYGEN SATURATION: 99 % | SYSTOLIC BLOOD PRESSURE: 165 MMHG | DIASTOLIC BLOOD PRESSURE: 68 MMHG

## 2022-11-22 LAB
ANION GAP SERPL CALC-SCNC: 6 MMOL/L — SIGNIFICANT CHANGE UP (ref 5–17)
BUN SERPL-MCNC: 71 MG/DL — HIGH (ref 7–23)
CALCIUM SERPL-MCNC: 9.3 MG/DL — SIGNIFICANT CHANGE UP (ref 8.5–10.1)
CHLORIDE SERPL-SCNC: 114 MMOL/L — HIGH (ref 96–108)
CO2 SERPL-SCNC: 23 MMOL/L — SIGNIFICANT CHANGE UP (ref 22–31)
CREAT SERPL-MCNC: 2.47 MG/DL — HIGH (ref 0.5–1.3)
EGFR: 24 ML/MIN/1.73M2 — LOW
FLUAV AG NPH QL: SIGNIFICANT CHANGE UP
FLUBV AG NPH QL: SIGNIFICANT CHANGE UP
GLUCOSE SERPL-MCNC: 99 MG/DL — SIGNIFICANT CHANGE UP (ref 70–99)
HCT VFR BLD CALC: 37 % — LOW (ref 39–50)
HGB BLD-MCNC: 11.4 G/DL — LOW (ref 13–17)
MCHC RBC-ENTMCNC: 27.7 PG — SIGNIFICANT CHANGE UP (ref 27–34)
MCHC RBC-ENTMCNC: 30.8 G/DL — LOW (ref 32–36)
MCV RBC AUTO: 89.8 FL — SIGNIFICANT CHANGE UP (ref 80–100)
NRBC # BLD: 0 /100 WBCS — SIGNIFICANT CHANGE UP (ref 0–0)
PLATELET # BLD AUTO: 299 K/UL — SIGNIFICANT CHANGE UP (ref 150–400)
POTASSIUM SERPL-MCNC: 4.6 MMOL/L — SIGNIFICANT CHANGE UP (ref 3.5–5.3)
POTASSIUM SERPL-SCNC: 4.6 MMOL/L — SIGNIFICANT CHANGE UP (ref 3.5–5.3)
RBC # BLD: 4.12 M/UL — LOW (ref 4.2–5.8)
RBC # FLD: 15.3 % — HIGH (ref 10.3–14.5)
SARS-COV-2 RNA SPEC QL NAA+PROBE: SIGNIFICANT CHANGE UP
SODIUM SERPL-SCNC: 143 MMOL/L — SIGNIFICANT CHANGE UP (ref 135–145)
WBC # BLD: 8.18 K/UL — SIGNIFICANT CHANGE UP (ref 3.8–10.5)
WBC # FLD AUTO: 8.18 K/UL — SIGNIFICANT CHANGE UP (ref 3.8–10.5)

## 2022-11-22 RX ORDER — PANTOPRAZOLE SODIUM 20 MG/1
1 TABLET, DELAYED RELEASE ORAL
Qty: 0 | Refills: 0 | DISCHARGE
Start: 2022-11-22

## 2022-11-22 RX ORDER — AMIODARONE HYDROCHLORIDE 400 MG/1
1 TABLET ORAL
Qty: 0 | Refills: 0 | DISCHARGE
Start: 2022-11-22

## 2022-11-22 RX ORDER — ACETAMINOPHEN 500 MG
2 TABLET ORAL
Qty: 0 | Refills: 0 | DISCHARGE
Start: 2022-11-22

## 2022-11-22 RX ORDER — DOXAZOSIN MESYLATE 4 MG
1 TABLET ORAL
Qty: 0 | Refills: 0 | DISCHARGE

## 2022-11-22 RX ORDER — DOXAZOSIN MESYLATE 4 MG
1 TABLET ORAL
Qty: 0 | Refills: 0 | DISCHARGE
Start: 2022-11-22

## 2022-11-22 RX ORDER — ATORVASTATIN CALCIUM 80 MG/1
1 TABLET, FILM COATED ORAL
Qty: 0 | Refills: 0 | DISCHARGE
Start: 2022-11-22

## 2022-11-22 RX ORDER — SENNA PLUS 8.6 MG/1
2 TABLET ORAL
Qty: 0 | Refills: 0 | DISCHARGE
Start: 2022-11-22

## 2022-11-22 RX ORDER — METOPROLOL TARTRATE 50 MG
1 TABLET ORAL
Qty: 0 | Refills: 0 | DISCHARGE
Start: 2022-11-22

## 2022-11-22 RX ORDER — ASPIRIN/CALCIUM CARB/MAGNESIUM 324 MG
1 TABLET ORAL
Qty: 0 | Refills: 0 | DISCHARGE
Start: 2022-11-22

## 2022-11-22 RX ORDER — LACTOBACILLUS ACIDOPHILUS 100MM CELL
1 CAPSULE ORAL
Qty: 0 | Refills: 0 | DISCHARGE
Start: 2022-11-22

## 2022-11-22 RX ORDER — CEPHALEXIN 500 MG
1 CAPSULE ORAL
Qty: 15 | Refills: 0
Start: 2022-11-22 | End: 2022-11-26

## 2022-11-22 RX ORDER — LANOLIN ALCOHOL/MO/W.PET/CERES
1 CREAM (GRAM) TOPICAL
Qty: 0 | Refills: 0 | DISCHARGE
Start: 2022-11-22

## 2022-11-22 RX ORDER — AMLODIPINE BESYLATE 2.5 MG/1
1 TABLET ORAL
Qty: 0 | Refills: 0 | DISCHARGE
Start: 2022-11-22

## 2022-11-22 RX ADMIN — Medication 81 MILLIGRAM(S): at 12:17

## 2022-11-22 RX ADMIN — PANTOPRAZOLE SODIUM 40 MILLIGRAM(S): 20 TABLET, DELAYED RELEASE ORAL at 05:39

## 2022-11-22 RX ADMIN — Medication 4 MILLIGRAM(S): at 05:39

## 2022-11-22 RX ADMIN — Medication 25 MILLIGRAM(S): at 05:39

## 2022-11-22 RX ADMIN — HEPARIN SODIUM 5000 UNIT(S): 5000 INJECTION INTRAVENOUS; SUBCUTANEOUS at 05:38

## 2022-11-22 RX ADMIN — AMLODIPINE BESYLATE 10 MILLIGRAM(S): 2.5 TABLET ORAL at 05:39

## 2022-11-22 RX ADMIN — Medication 1 TABLET(S): at 05:39

## 2022-11-22 RX ADMIN — HEPARIN SODIUM 5000 UNIT(S): 5000 INJECTION INTRAVENOUS; SUBCUTANEOUS at 12:17

## 2022-11-22 RX ADMIN — AMIODARONE HYDROCHLORIDE 200 MILLIGRAM(S): 400 TABLET ORAL at 05:39

## 2022-11-22 NOTE — DISCHARGE NOTE PROVIDER - NSDCCPCAREPLAN_GEN_ALL_CORE_FT
PRINCIPAL DISCHARGE DIAGNOSIS  Diagnosis: UTI (urinary tract infection)  Assessment and Plan of Treatment: complete rocephin course      SECONDARY DISCHARGE DIAGNOSES  Diagnosis: Generalized weakness  Assessment and Plan of Treatment: rehab    Diagnosis: HTN (hypertension)  Assessment and Plan of Treatment: continue bp medications    Diagnosis: NSTEMI (non-ST elevation myocardial infarction)  Assessment and Plan of Treatment: stable     PRINCIPAL DISCHARGE DIAGNOSIS  Diagnosis: UTI (urinary tract infection)  Assessment and Plan of Treatment: continue keflex for 5 more days      SECONDARY DISCHARGE DIAGNOSES  Diagnosis: Generalized weakness  Assessment and Plan of Treatment: rehab    Diagnosis: HTN (hypertension)  Assessment and Plan of Treatment: continue bp medications    Diagnosis: NSTEMI (non-ST elevation myocardial infarction)  Assessment and Plan of Treatment: stable

## 2022-11-22 NOTE — DISCHARGE NOTE PROVIDER - HOSPITAL COURSE
Patient is a 89 y/o male admitted with UTI started on rocephin. During hospital course, noted with L wrist swelling concerning for cellulitis. ID consulted. patient remained on reocephin. Patient cleared for discharge to rehab per Dr. Lord.       Problem/Plan - 1:  ·  Problem: Acute UTI.   ·  Plan: continue rocephin. as  per  ID  Problem/Plan - 2:  ·  Problem: Benign prostate hyperplasia.   ·  Plan: cardura.  L  wrist  pain swelling  normal   uric  acid  levels observe  with  AB  a   can  have  gout  with  normal  uric  acid  levels if  no  improvemetn  might  benfit  from  steroid  trial  acute  delirium appears  resolved   L  wrist  xr  degenerative  changes  continue  to  monitor  labs  non  obstructive Rt  renal  stone    Problem/Plan - 3:  ·  Problem: PAF (paroxysmal atrial fibrillation).   ·  Plan: metoprolol  amiodarone.    Problem/Plan - 4:  ·  Problem: Hypertension.   ·  Plan: norvasc     Problem/Plan - 5:  ·  Problem: Unsteady gait.   ·  Plan: PT  eval.  CKD  nephrology  eval  appreciated    hx  CAD  elevated  troponin   unclear  significance  in  setting  of  decreased  GFR  and  no  cardiac  symptoms trending  downward  repeat   EKG no  changes  (RBBB old )  TTE reviewed  no  change  from  prior   studies  BUN/CREAT  stable  can be  discharged to rehab to  complete AB  course  as per  ID Patient is a 89 y/o male admitted with UTI started on rocephin. During hospital course, noted with L wrist swelling concerning for cellulitis. ID consulted. patient remained on reocephin. Patient cleared for discharge to rehab per Dr. Lord.       Problem/Plan - 1:  ·  Problem: Acute UTI.   ·  Plan: continue with keflex at discharge per ID    Problem/Plan - 2:  ·  Problem: Benign prostate hyperplasia.   ·  Plan: cardura.  L  wrist  pain swelling  normal   uric  acid  levels observe  with  AB  a   can  have  gout  with  normal  uric  acid  levels if  no  improvemetn  might  benfit  from  steroid  trial  acute  delirium appears  resolved   L  wrist  xr  degenerative  changes  continue  to  monitor  labs  non  obstructive Rt  renal  stone    Problem/Plan - 3:  ·  Problem: PAF (paroxysmal atrial fibrillation).   ·  Plan: metoprolol  amiodarone.    Problem/Plan - 4:  ·  Problem: Hypertension.   ·  Plan: norvasc     Problem/Plan - 5:  ·  Problem: Unsteady gait.   ·  Plan: PT  eval.  CKD  nephrology  eval  appreciated    hx  CAD  elevated  troponin   unclear  significance  in  setting  of  decreased  GFR  and  no  cardiac  symptoms trending  downward  repeat   EKG no  changes  (RBBB old )  TTE reviewed  no  change  from  prior   studies  BUN/CREAT  stable  can be  discharged to rehab to  complete AB  course  as per  ID

## 2022-11-22 NOTE — DISCHARGE NOTE PROVIDER - NSDCMRMEDTOKEN_GEN_ALL_CORE_FT
acetaminophen 325 mg oral tablet: 2 tab(s) orally every 6 hours, As needed, Mild Pain (1 - 3)  amiodarone 200 mg oral tablet: 1 tab(s) orally once a day  amLODIPine 10 mg oral tablet: 1 tab(s) orally once a day  aspirin 81 mg oral delayed release tablet: 1 tab(s) orally once a day  atorvastatin 80 mg oral tablet: 1 tab(s) orally once a day (at bedtime)  Centrum Silver Men&#x27;s: 1     doxazosin 4 mg oral tablet: 1 tab(s) orally once a day  lactobacillus acidophilus oral capsule: 1 cap(s) orally once a day  melatonin 3 mg oral tablet: 1 tab(s) orally once a day (at bedtime)  metoprolol tartrate 25 mg oral tablet: 1 tab(s) orally every 6 hours  pantoprazole 40 mg oral delayed release tablet: 1 tab(s) orally once a day (before a meal)  polyethylene glycol 3350 oral powder for reconstitution: 17 gram(s) orally once a day, As Needed for constipation  senna leaf extract oral tablet: 2 tab(s) orally once a day (at bedtime)   acetaminophen 325 mg oral tablet: 2 tab(s) orally every 6 hours, As needed, Mild Pain (1 - 3)  amiodarone 200 mg oral tablet: 1 tab(s) orally once a day  amLODIPine 10 mg oral tablet: 1 tab(s) orally once a day  aspirin 81 mg oral delayed release tablet: 1 tab(s) orally once a day  atorvastatin 80 mg oral tablet: 1 tab(s) orally once a day (at bedtime)  Centrum Silver Men&#x27;s: 1     cephalexin 500 mg oral tablet: 1 tab(s) orally every 8 hours   doxazosin 4 mg oral tablet: 1 tab(s) orally once a day  lactobacillus acidophilus oral capsule: 1 cap(s) orally once a day  melatonin 3 mg oral tablet: 1 tab(s) orally once a day (at bedtime)  metoprolol tartrate 25 mg oral tablet: 1 tab(s) orally every 6 hours  pantoprazole 40 mg oral delayed release tablet: 1 tab(s) orally once a day (before a meal)  polyethylene glycol 3350 oral powder for reconstitution: 17 gram(s) orally once a day, As Needed for constipation  senna leaf extract oral tablet: 2 tab(s) orally once a day (at bedtime)

## 2022-11-22 NOTE — PROGRESS NOTE ADULT - NUTRITIONAL ASSESSMENT
This patient has been assessed with a concern for Malnutrition and has been determined to have a diagnosis/diagnoses of Severe protein-calorie malnutrition.    This patient is being managed with:   Diet Regular-  Low Sodium  Supplement Feeding Modality:  Oral  Ensure Clear Cans or Servings Per Day:  1       Frequency:  Three Times a day  Entered: Nov 17 2022  3:55PM    

## 2022-11-22 NOTE — DISCHARGE NOTE PROVIDER - DETAILS OF MALNUTRITION DIAGNOSIS/DIAGNOSES
This patient has been assessed with a concern for Malnutrition and was treated during this hospitalization for the following Nutrition diagnosis/diagnoses:     -  11/17/2022: Severe protein-calorie malnutrition

## 2022-11-22 NOTE — PROGRESS NOTE ADULT - SUBJECTIVE AND OBJECTIVE BOX
INTERVAL HPI/OVERNIGHT EVENTS:        REVIEW OF SYSTEMS:  CONSTITUTIONAL:    milfd  l  wrist  pain    NECK: No pain or stiffnes  RESPIRATORY: No SOB   CARDIOVASCULAR: No chest pain, palpitations, dizziness,   GASTROINTESTINAL: No abdominal pain. No nausea, vomiting,   NEUROLOGICAL: No headaches, no  blurry  vision no  dizziness  SKIN: No itching,   MUSCULOSKELETAL:    MEDICATION:  acetaminophen     Tablet .. 650 milliGRAM(s) Oral every 6 hours PRN  aMIOdarone    Tablet 200 milliGRAM(s) Oral daily  amLODIPine   Tablet 10 milliGRAM(s) Oral daily  aspirin enteric coated 81 milliGRAM(s) Oral daily  atorvastatin 80 milliGRAM(s) Oral at bedtime  cefTRIAXone   IVPB      cefTRIAXone   IVPB 1000 milliGRAM(s) IV Intermittent every 24 hours  doxazosin 4 milliGRAM(s) Oral daily  heparin   Injectable 5000 Unit(s) SubCutaneous every 8 hours  lactobacillus acidophilus 1 Tablet(s) Oral every 12 hours  melatonin 3 milliGRAM(s) Oral at bedtime  metoprolol tartrate 25 milliGRAM(s) Oral every 6 hours  pantoprazole    Tablet 40 milliGRAM(s) Oral before breakfast  senna 2 Tablet(s) Oral at bedtime  sodium chloride 0.45%. 1000 milliLiter(s) IV Continuous <Continuous>    Vital Signs Last 24 Hrs  T(C): 36.4 (19 Nov 2022 04:56), Max: 36.8 (18 Nov 2022 10:05)  T(F): 97.6 (19 Nov 2022 04:56), Max: 98.3 (18 Nov 2022 17:25)  HR: 60 (19 Nov 2022 04:56) (52 - 72)  BP: 157/71 (19 Nov 2022 04:56) (115/60 - 157/71)  BP(mean): 99 (19 Nov 2022 04:56) (78 - 99)  RR: 19 (19 Nov 2022 04:56) (15 - 19)  SpO2: 98% (19 Nov 2022 04:56) (95% - 98%)    Parameters below as of 19 Nov 2022 04:56  Patient On (Oxygen Delivery Method): room air        PHYSICAL EXAM:  GENERAL: NAD, well-groomed, well-developed  HEENT : Conjuntivae  clear sclerae anicteric  NECK: Supple, No JVD, Normal thyroid  NERVOUS SYSTEM:  Alert oriented   no  focal  deficits;   CHEST/LUNG: Clear    HEART: Regular rate and rhythm; No murmurs, rubs, or gallops  ABDOMEN: Soft, Nontender, Nondistended; Bowel sounds present  EXTREMITIES:  erythematous  plaque   tenderness dorsal  asped=ct  l  wrist  DROM  SKIN: No rashes   LABS:                        10.8   8.73  )-----------( 234      ( 19 Nov 2022 06:30 )             34.2     11-19    144  |  115<H>  |  66<H>  ----------------------------<  135<H>  4.2   |  21<L>  |  2.72<H>    Ca    9.2      19 Nov 2022 06:30    TPro  6.8  /  Alb  2.3<L>  /  TBili  0.4  /  DBili  x   /  AST  15  /  ALT  23  /  AlkPhos  81  11-19        CAPILLARY BLOOD GLUCOSE          RADIOLOGY & ADDITIONAL TESTS:    Imaging reports  Personally Reviewed:  [ ] YES  [ ] NO    Consultant(s) Notes Reviewed:  [x ] YES  [ ] NO    Care Discussed with Consultants/Other Providers [x ] YES  [ ] NO  iProblem/Plan - 1:  ·  Problem: Acute UTI.   ·  Plan: continue rocephin. ID  eval 2/2 worsening  inflammatory  parameters   Problem/Plan - 2:  ·  Problem: Benign prostate hyperplasia.   ·  Plan: cardura.  L  wrist  pain swelling  normal   uric  acid  levels observe  with  AB     can  have  gout  with  normal  uric  acid  levels if  no  improvemetn  might  benfit  from  steroid  trial  acute  delirium appears  resolved   continue  to  monitor  labs  non  obstructive Rt  renal  stone    Problem/Plan - 3:  ·  Problem: PAF (paroxysmal atrial fibrillation).   ·  Plan: metoprolol  amiodarone.    Problem/Plan - 4:  ·  Problem: Hypertension.   ·  Plan: norvasc     Problem/Plan - 5:  ·  Problem: Unsteady gait.   ·  Plan: PT  eval.  CKD  nephrology  eval  appreciated    hx  CAD  elevated  troponin   unclear  significance  in  setting  of  decreased  GFR  and  no  cardiac  symptoms trending  downward  repeat   EKG no  changes  (RBBB old )  will  check  TTE  befoire  discharge  discussed  with  pt's  out patient  cardiologist  hanna Bauer  will communicate  office  findings  
    INTERVAL HPI/OVERNIGHT EVENTS:        REVIEW OF SYSTEMS:  CONSTITUTIONAL:  no  complaints    NECK: No pain or stiffnes  RESPIRATORY: No SOB   CARDIOVASCULAR: No chest pain, palpitations, dizziness,   GASTROINTESTINAL: No abdominal pain. No nausea, vomiting,   NEUROLOGICAL: No headaches, no  blurry  vision no  dizziness  SKIN: No itching,   MUSCULOSKELETAL: No pain    MEDICATION:  acetaminophen     Tablet .. 650 milliGRAM(s) Oral every 6 hours PRN  aMIOdarone    Tablet 200 milliGRAM(s) Oral daily  amLODIPine   Tablet 10 milliGRAM(s) Oral daily  aspirin enteric coated 81 milliGRAM(s) Oral daily  atorvastatin 80 milliGRAM(s) Oral at bedtime  cefTRIAXone   IVPB 1000 milliGRAM(s) IV Intermittent every 24 hours  doxazosin 4 milliGRAM(s) Oral daily  heparin   Injectable 5000 Unit(s) SubCutaneous every 8 hours  melatonin 3 milliGRAM(s) Oral at bedtime  metoprolol tartrate 25 milliGRAM(s) Oral every 6 hours  pantoprazole    Tablet 40 milliGRAM(s) Oral before breakfast  senna 2 Tablet(s) Oral at bedtime    Vital Signs Last 24 Hrs  T(C): 36.2 (15 Nov 2022 11:25), Max: 36.9 (15 Nov 2022 04:44)  T(F): 97.1 (15 Nov 2022 11:25), Max: 98.4 (15 Nov 2022 04:44)  HR: 54 (15 Nov 2022 14:32) (54 - 72)  BP: 142/65 (15 Nov 2022 14:32) (142/65 - 172/83)  BP(mean): 108 (14 Nov 2022 15:24) (108 - 108)  RR: 18 (15 Nov 2022 14:32) (18 - 19)  SpO2: 97% (15 Nov 2022 14:32) (96% - 100%)    Parameters below as of 15 Nov 2022 14:32  Patient On (Oxygen Delivery Method): room air        PHYSICAL EXAM:  GENERAL: NAD, well-groomed, well-developed  HEENT : Conjuntivae  clear sclerae anicteric  NECK: Supple, No JVD, Normal thyroid  NERVOUS SYSTEM:  Alert   mildly  confused   no  focal  deficits;   CHEST/LUNG: Clear    HEART: Regular rate and rhythm; No murmurs, rubs, or gallops  ABDOMEN: Soft, Nontender, Nondistended; Bowel sounds present  EXTREMITIES:  no  edema no  tenderness  SKIN: No rashes   LABS:                        12.0   9.26  )-----------( 203      ( 15 Nov 2022 06:02 )             37.2     11-15    142  |  112<H>  |  45<H>  ----------------------------<  99  4.0   |  22  |  2.35<H>    Ca    9.0      15 Nov 2022 06:02    TPro  6.8  /  Alb  2.8<L>  /  TBili  0.7  /  DBili  x   /  AST  13<L>  /  ALT  14  /  AlkPhos  79  11-15    Troponin I, High Sensitivity Result: 281.6:     CAPILLARY BLOOD GLUCOSE          RADIOLOGY & ADDITIONAL TESTS:    Imaging reports  Personally Reviewed:  [x ] YES  [ ] NO    Consultant(s) Notes Reviewed:  [ ] YES  [ ] NO    Care Discussed with Consultants/Other Providers [x ] YES  [ ] NO  tiProblem/Plan - 1:  ·  Problem: Acute UTI.   ·  Plan: rocephin. check  renal bladder  sono    Problem/Plan - 2:  ·  Problem: Benign prostate hyperplasia.   ·  Plan: cardura.  non  obstructive Rt  renal  stone    Problem/Plan - 3:  ·  Problem: PAF (paroxysmal atrial fibrillation).   ·  Plan: metoprolol  amiodarone.    Problem/Plan - 4:  ·  Problem: Hypertension.   ·  Plan: norvasc     Problem/Plan - 5:  ·  Problem: Unsteady gait.   ·  Plan: PT  eval.  CKD  nephrology  eval  appreciated  elevated  troponin   unclear  significance  in  setting  of  decreased  GFR  and  no  cardiac  symptoms trending  downward  
    INTERVAL HPI/OVERNIGHT EVENTS:        REVIEW OF SYSTEMS:  CONSTITUTIONAL:  no  complaints    NECK: No pain or stiffnes  RESPIRATORY: No SOB   CARDIOVASCULAR: No chest pain, palpitations, dizziness,   GASTROINTESTINAL: No abdominal pain. No nausea, vomiting,   NEUROLOGICAL: No headaches, no  blurry  vision no  dizziness  SKIN: No itching,   MUSCULOSKELETAL: No pain    MEDICATION:  acetaminophen     Tablet .. 650 milliGRAM(s) Oral every 6 hours PRN  aMIOdarone    Tablet 200 milliGRAM(s) Oral daily  amLODIPine   Tablet 10 milliGRAM(s) Oral daily  aspirin enteric coated 81 milliGRAM(s) Oral daily  atorvastatin 80 milliGRAM(s) Oral at bedtime  doxazosin 4 milliGRAM(s) Oral daily  heparin   Injectable 5000 Unit(s) SubCutaneous every 8 hours  melatonin 3 milliGRAM(s) Oral at bedtime  metoprolol tartrate 25 milliGRAM(s) Oral every 6 hours  pantoprazole    Tablet 40 milliGRAM(s) Oral before breakfast  senna 2 Tablet(s) Oral at bedtime    Vital Signs Last 24 Hrs  T(C): 36.1 (17 Nov 2022 04:49), Max: 37.2 (16 Nov 2022 10:43)  T(F): 96.9 (17 Nov 2022 04:49), Max: 98.9 (16 Nov 2022 10:43)  HR: 62 (17 Nov 2022 04:49) (60 - 78)  BP: 185/70 (17 Nov 2022 04:49) (128/58 - 191/77)  BP(mean): 100 (16 Nov 2022 16:53) (100 - 100)  RR: 19 (17 Nov 2022 04:49) (18 - 19)  SpO2: 95% (17 Nov 2022 04:49) (95% - 98%)    Parameters below as of 16 Nov 2022 16:53  Patient On (Oxygen Delivery Method): room air        PHYSICAL EXAM:  GENERAL: NAD, well-groomed, well-developed  HEENT : Conjuntivae  clear sclerae anicteric  NECK: Supple, No JVD, Normal thyroid  NERVOUS SYSTEM:  Alert oriented   no  focal  deficits;   CHEST/LUNG: Clear    HEART: Regular rate and rhythm; No murmurs, rubs, or gallops  ABDOMEN: Soft, Nontender, Nondistended; Bowel sounds present  EXTREMITIES:  no  edema no  tenderness  SKIN: No rashes   LABS:                        11.8   8.95  )-----------( 207      ( 17 Nov 2022 07:25 )             37.0     11-17    146<H>  |  115<H>  |  49<H>  ----------------------------<  128<H>  4.3   |  25  |  2.47<H>    Ca    9.2      17 Nov 2022 07:25    TPro  6.8  /  Alb  2.6<L>  /  TBili  0.6  /  DBili  x   /  AST  13<L>  /  ALT  14  /  AlkPhos  78  11-17        CAPILLARY BLOOD GLUCOSE          RADIOLOGY & ADDITIONAL TESTS:    Imaging reports  Personally Reviewed:  [ ] YES  [ ] NO    Consultant(s) Notes Reviewed:  [x ] YES  [ ] NO    Care Discussed with Consultants/Other Providers [x ] YES  [ ] NO  tiProblem/Plan - 1:  ·  Problem: Acute UTI.   ·  Plan: rocephin.   Problem/Plan - 2:  ·  Problem: Benign prostate hyperplasia.   ·  Plan: cardura.  non  obstructive Rt  renal  stone    Problem/Plan - 3:  ·  Problem: PAF (paroxysmal atrial fibrillation).   ·  Plan: metoprolol  amiodarone.    Problem/Plan - 4:  ·  Problem: Hypertension.   ·  Plan: norvasc     Problem/Plan - 5:  ·  Problem: Unsteady gait.   ·  Plan: PT  eval.  CKD  nephrology  eval  appreciated  elevated  troponin   unclear  significance  in  setting  of  decreased  GFR  and  no  cardiac  symptoms trending  downward  will continue to  monitor  recheck  EKG  discussed  with  pt's  out patient  cardiologist  hanna Bauer  will communicate  office  findings    
    INTERVAL HPI/OVERNIGHT EVENTS:    no  further  wrist  pain    REVIEW OF SYSTEMS:  CONSTITUTIONAL:  no  complaints    NECK: No pain or stiffnes  RESPIRATORY: No SOB   CARDIOVASCULAR: No chest pain, palpitations, dizziness,   GASTROINTESTINAL: No abdominal pain. No nausea, vomiting,   NEUROLOGICAL: No headaches, no  blurry  vision no  dizziness  SKIN: No itching,   MUSCULOSKELETAL: No pain    MEDICATION:  acetaminophen     Tablet .. 650 milliGRAM(s) Oral every 6 hours PRN  aMIOdarone    Tablet 200 milliGRAM(s) Oral daily  amLODIPine   Tablet 10 milliGRAM(s) Oral daily  aspirin enteric coated 81 milliGRAM(s) Oral daily  atorvastatin 80 milliGRAM(s) Oral at bedtime  cefTRIAXone   IVPB 1000 milliGRAM(s) IV Intermittent every 24 hours  cefTRIAXone   IVPB      doxazosin 4 milliGRAM(s) Oral daily  heparin   Injectable 5000 Unit(s) SubCutaneous every 8 hours  lactobacillus acidophilus 1 Tablet(s) Oral every 12 hours  melatonin 3 milliGRAM(s) Oral at bedtime  metoprolol tartrate 25 milliGRAM(s) Oral every 6 hours  pantoprazole    Tablet 40 milliGRAM(s) Oral before breakfast  senna 2 Tablet(s) Oral at bedtime  sodium chloride 0.45%. 1000 milliLiter(s) IV Continuous <Continuous>    Vital Signs Last 24 Hrs  T(C): 36.3 (21 Nov 2022 05:36), Max: 36.3 (20 Nov 2022 10:05)  T(F): 97.3 (21 Nov 2022 05:36), Max: 97.4 (20 Nov 2022 23:40)  HR: 71 (21 Nov 2022 05:36) (53 - 71)  BP: 162/80 (21 Nov 2022 05:36) (116/57 - 162/80)  BP(mean): --  RR: 18 (21 Nov 2022 05:36) (18 - 18)  SpO2: 98% (21 Nov 2022 05:36) (97% - 98%)    Parameters below as of 20 Nov 2022 10:05  Patient On (Oxygen Delivery Method): room air        PHYSICAL EXAM:  GENERAL: NAD, well-groomed, well-developed  HEENT : Conjuntivae  clear sclerae anicteric  NECK: Supple, No JVD, Normal thyroid  NERVOUS SYSTEM:  Alert oriented   no  focal  deficits;   CHEST/LUNG: Clear    HEART: Regular rate and rhythm; No murmurs, rubs, or gallops  ABDOMEN: Soft, Nontender, Nondistended; Bowel sounds present  EXTREMITIES:  no  edema no  tenderness  SKIN: No rashes   LABS:                        11.2   8.09  )-----------( 286      ( 21 Nov 2022 05:45 )             36.8     11-21    143  |  114<H>  |  86<H>  ----------------------------<  113<H>  4.7   |  21<L>  |  3.04<H>    Ca    9.5      21 Nov 2022 05:45    TPro  7.5  /  Alb  2.7<L>  /  TBili  0.3  /  DBili  x   /  AST  27  /  ALT  41  /  AlkPhos  98  11-21        CAPILLARY BLOOD GLUCOSE          RADIOLOGY & ADDITIONAL TESTS:    Imaging reports  Personally Reviewed:  [ ] YES  [ ] NO    Consultant(s) Notes Reviewed:  [ x] YES  [ ] NO    Care Discussed with Consultants/Other Providers [ x] YES  [ ] NO  Problem/Plan - 1:  ·  Problem: Acute UTI.   ·  Plan: continue rocephin. as  per  ID  Problem/Plan - 2:  ·  Problem: Benign prostate hyperplasia.   ·  Plan: cardura.  L  wrist  pain swelling  normal   uric  acid  levels observe  with  AB  a   can  have  gout  with  normal  uric  acid  levels if  no  improvemetn  might  benfit  from  steroid  trial  acute  delirium appears  resolved   L  wrist  xr  degenerative  changes  continue  to  monitor  labs  non  obstructive Rt  renal  stone    Problem/Plan - 3:  ·  Problem: PAF (paroxysmal atrial fibrillation).   ·  Plan: metoprolol  amiodarone.    Problem/Plan - 4:  ·  Problem: Hypertension.   ·  Plan: norvasc     Problem/Plan - 5:  ·  Problem: Unsteady gait.   ·  Plan: PT  eval.  CKD  nephrology  eval  appreciated    hx  CAD  elevated  troponin   unclear  significance  in  setting  of  decreased  GFR  and  no  cardiac  symptoms trending  downward  repeat   EKG no  changes  (RBBB old )  TTE reviewed  no  change  from  prior   studies  [place]  BUN/CREAT  if  stable  can be  discharged to rehab
NEPHROLOGY PROGRESS NOTE    CHIEF COMPLAINT:  CKD    HPI:  Renal function about same.    ROS:  no SOB    EXAM:  T(F): 97.1 (11-15-22 @ 11:25)  HR: 58 (11-15-22 @ 11:25)  BP: 149/62 (11-15-22 @ 11:25)  RR: 18 (11-15-22 @ 11:25)  SpO2: 97% (11-15-22 @ 11:25)    Conversant, in no apparent distress  Normal respiratory effort, lungs clear bilaterally  Heart RRR with no murmur, no peripheral edema         LABS                             12.0   9.26  )-----------( 203      ( 15 Nov 2022 06:02 )             37.2          11-15    142  |  112<H>  |  45<H>  ----------------------------<  99  4.0   |  22  |  2.35<H>    Ca    9.0      15 Nov 2022 06:02    TPro  6.8  /  Alb  2.8<L>  /  TBili  0.7  /  DBili  x   /  AST  13<L>  /  ALT  14  /  AlkPhos  79  11-15           RADIOLOGY:  Renal sono shows 10-11 cm kidneys with multiple cysts, no hydro      ASSESSMENT:  1.  Azotemia - probably CKD  2.  E. Coli UTI    PLAN:  Antibiotics as per medicine  Conservative management of renal disease;  slow titration of BP meds      
Resting     Vital Signs Last 24 Hrs  T(C): 36.3 (11-20-22 @ 16:26), Max: 37 (11-20-22 @ 05:07)  T(F): 97.3 (11-20-22 @ 16:26), Max: 98.6 (11-20-22 @ 05:07)  HR: 54 (11-20-22 @ 16:26) (53 - 63)  BP: 116/57 (11-20-22 @ 16:26) (116/57 - 149/71)  RR: 18 (11-20-22 @ 16:26) (17 - 18)  SpO2: 97% (11-20-22 @ 16:26) (97% - 100%)    s1s2  b/l air entry  soft, ND  no edema                                10.7   7.87  )-----------( 260      ( 20 Nov 2022 07:10 )             34.8     20 Nov 2022 07:10    142    |  114    |  76     ----------------------------<  145    4.3     |  21     |  2.78     Ca    9.1        20 Nov 2022 07:10    TPro  7.1    /  Alb  2.3    /  TBili  0.2    /  DBili  x      /  AST  40     /  ALT  40     /  AlkPhos  104    20 Nov 2022 07:10    LIVER FUNCTIONS - ( 20 Nov 2022 07:10 )  Alb: 2.3 g/dL / Pro: 7.1 gm/dL / ALK PHOS: 104 U/L / ALT: 40 U/L / AST: 40 U/L / GGT: x           Culture - Blood (collected 18 Nov 2022 18:10)  Source: .Blood Blood  Preliminary Report (20 Nov 2022 03:01):    No growth to date.    Culture - Blood (collected 18 Nov 2022 18:00)  Source: .Blood Blood  Preliminary Report (20 Nov 2022 03:01):    No growth to date.    acetaminophen     Tablet .. 650 milliGRAM(s) Oral every 6 hours PRN  aMIOdarone    Tablet 200 milliGRAM(s) Oral daily  amLODIPine   Tablet 10 milliGRAM(s) Oral daily  aspirin enteric coated 81 milliGRAM(s) Oral daily  atorvastatin 80 milliGRAM(s) Oral at bedtime  cefTRIAXone   IVPB 1000 milliGRAM(s) IV Intermittent every 24 hours  cefTRIAXone   IVPB      doxazosin 4 milliGRAM(s) Oral daily  heparin   Injectable 5000 Unit(s) SubCutaneous every 8 hours  lactobacillus acidophilus 1 Tablet(s) Oral every 12 hours  melatonin 3 milliGRAM(s) Oral at bedtime  metoprolol tartrate 25 milliGRAM(s) Oral every 6 hours  pantoprazole    Tablet 40 milliGRAM(s) Oral before breakfast  senna 2 Tablet(s) Oral at bedtime  sodium chloride 0.45%. 1000 milliLiter(s) IV Continuous <Continuous>    ASSESSMENT/PLAN:    Hemodynamic CARLOS/CKD 4 in setting of UTI (Cr 2.21 - 11/13/22)  E. Coli UTI, abx as ordered  Cr appears to be reaching plateau  Would continue gentle IVF for another day  Avoid nephrotoxins  F/u [facility]    [phone]
[facility], [facility]  NEPHROLOGY AND HYPERTENSION  [address]  [address]  [place] [address]  [phone]    MD GEOVANNI REYNOLDS MD ANDREY GONCHARUK, MD MADHU KORRAPATI, MD YELENA ROSENBERG, MD GAURANG AGUIRRE, MD DAVIN GOLDEN MD          Patient events noted    MEDICATIONS  (STANDING):  aMIOdarone    Tablet 200 milliGRAM(s) Oral daily  amLODIPine   Tablet 10 milliGRAM(s) Oral daily  aspirin enteric coated 81 milliGRAM(s) Oral daily  atorvastatin 80 milliGRAM(s) Oral at bedtime  cefTRIAXone   IVPB      cefTRIAXone   IVPB 1000 milliGRAM(s) IV Intermittent every 24 hours  doxazosin 4 milliGRAM(s) Oral daily  heparin   Injectable 5000 Unit(s) SubCutaneous every 8 hours  lactobacillus acidophilus 1 Tablet(s) Oral every 12 hours  melatonin 3 milliGRAM(s) Oral at bedtime  metoprolol tartrate 25 milliGRAM(s) Oral every 6 hours  pantoprazole    Tablet 40 milliGRAM(s) Oral before breakfast  senna 2 Tablet(s) Oral at bedtime  sodium chloride 0.45%. 1000 milliLiter(s) (60 mL/Hr) IV Continuous <Continuous>    MEDICATIONS  (PRN):  acetaminophen     Tablet .. 650 milliGRAM(s) Oral every 6 hours PRN Mild Pain (1 - 3)      PHYSICAL EXAM:      T(C): 36.6 (11-21-22 @ 23:07), Max: 36.6 (11-21-22 @ 23:07)  HR: 57 (11-21-22 @ 23:07) (54 - 71)  BP: 165/78 (11-21-22 @ 23:07) (120/62 - 165/78)  RR: 18 (11-21-22 @ 23:07) (18 - 18)  SpO2: 98% (11-21-22 @ 23:07) (97% - 99%)  Wt(kg): --  Lungs clear  Heart S1S2  Abd soft NT ND  Extremities:   tr edema                                    11.2   8.09  )-----------( 286      ( 21 Nov 2022 05:45 )             36.8     11-21    143  |  114<H>  |  86<H>  ----------------------------<  113<H>  4.7   |  21<L>  |  3.04<H>    Ca    9.5      21 Nov 2022 05:45    TPro  7.5  /  Alb  2.7<L>  /  TBili  0.3  /  DBili  x   /  AST  27  /  ALT  41  /  AlkPhos  98  11-21      LIVER FUNCTIONS - ( 21 Nov 2022 05:45 )  Alb: 2.7 g/dL / Pro: 7.5 gm/dL / ALK PHOS: 98 U/L / ALT: 41 U/L / AST: 27 U/L / GGT: x           Creatinine Trend: 3.04<--, 2.78<--, 2.72<--, 2.49<--, 2.47<--, 2.37<--        ASSESSMENT/PLAN:    Hemodynamic CARLOS/CKD 4 in setting of UTI (Cr 2.21 - 11/13/22)  E. Coli UTI, abx as ordered  Cr appears to be reaching plateau  Would continue gentle IVF   Avoid nephrotoxins  F/u BMP      Tan Leahy MD
    INTERVAL HPI/OVERNIGHT EVENTS:        REVIEW OF SYSTEMS:  CONSTITUTIONAL:   confused verbally  aggressive towards  staff    NECK: No pain or stiffnes  RESPIRATORY: No SOB   CARDIOVASCULAR: No chest pain, palpitations, dizziness,   GASTROINTESTINAL: No abdominal pain. No nausea, vomiting,   NEUROLOGICAL: No headaches, no  blurry  vision no  dizziness  SKIN: No itching,   MUSCULOSKELETAL: No pain    MEDICATION:  acetaminophen     Tablet .. 650 milliGRAM(s) Oral every 6 hours PRN  aMIOdarone    Tablet 200 milliGRAM(s) Oral daily  amLODIPine   Tablet 10 milliGRAM(s) Oral daily  aspirin enteric coated 81 milliGRAM(s) Oral daily  atorvastatin 80 milliGRAM(s) Oral at bedtime  cefTRIAXone   IVPB 1000 milliGRAM(s) IV Intermittent every 24 hours  doxazosin 4 milliGRAM(s) Oral daily  heparin   Injectable 5000 Unit(s) SubCutaneous every 12 hours  metoprolol tartrate 25 milliGRAM(s) Oral every 6 hours  pantoprazole    Tablet 40 milliGRAM(s) Oral before breakfast  senna 2 Tablet(s) Oral at bedtime    Vital Signs Last 24 Hrs  T(C): 36.6 (2022 15:24), Max: 36.7 (2022 23:36)  T(F): 97.8 (2022 15:24), Max: 98 (2022 23:36)  HR: 64 (2022 15:24) (52 - 64)  BP: 143/70 (2022 15:24) (143/70 - 172/67)  BP(mean): 108 (2022 15:24) (108 - 108)  RR: 19 (2022 15:24) (15 - 20)  SpO2: 100% (2022 15:24) (95% - 100%)    Parameters below as of 2022 11:17  Patient On (Oxygen Delivery Method): room air        PHYSICAL EXAM:  GENERAL: NAD, well-groomed, well-developed  HEENT : Conjuntivae  clear sclerae anicteric  NECK: Supple, No JVD, Normal thyroid  NERVOUS SYSTEM:  Alert oriented x2  no  focal  deficits;   CHEST/LUNG: Clear    HEART: Regular rate and rhythm; No murmurs, rubs, or gallops  ABDOMEN: Soft, Nontender, Nondistended; Bowel sounds present  EXTREMITIES:  no  edema no  tenderness  SKIN: No rashes   LABS:                        12.5   8.93  )-----------( 181      ( 2022 06:12 )             40.8     11-14    140  |  110<H>  |  40<H>  ----------------------------<  81  4.7   |  25  |  2.07<H>    Ca    9.3      2022 06:12    TPro  7.4  /  Alb  3.0<L>  /  TBili  0.9  /  DBili  x   /  AST  16  /  ALT  16  /  AlkPhos  86  11-14      Urinalysis Basic - ( 2022 12:00 )    Color: Yellow / Appearance: Clear / S.010 / pH: x  Gluc: x / Ketone: Negative  / Bili: Negative / Urobili: Negative mg/dL   Blood: x / Protein: 100 mg/dL / Nitrite: Positive   Leuk Esterase: Moderate / RBC: >50 /HPF / WBC >50   Sq Epi: x / Non Sq Epi: x / Bacteria: Many      CAPILLARY BLOOD GLUCOSE          RADIOLOGY & ADDITIONAL TESTS:    Imaging reports  Personally Reviewed:  [ ] YES  [ ] NO    Consultant(s) Notes Reviewed:  [x ] YES  [ ] NO    Care Discussed with Consultants/Other Providers [x ] YES  [ ] NO  Problem/Plan - 1:  ·  Problem: Acute UTI.   ·  Plan: rocephin. check  renal bladder  sono    Problem/Plan - 2:  ·  Problem: Benign prostate hyperplasia.   ·  Plan: cardura.    Problem/Plan - 3:  ·  Problem: PAF (paroxysmal atrial fibrillation).   ·  Plan: metoprolol  amiopdarone.    Problem/Plan - 4:  ·  Problem: Hypertension.   ·  Plan: norvasc     Problem/Plan - 5:  ·  Problem: Unsteady gait.   ·  Plan: PT  eval.  CKD  nephrology  eval  appreciated  elevated  troponin   unclear  significance  in  setting  of  decreased  GFR  and  no  cardiac  symptoms
    INTERVAL HPI/OVERNIGHT EVENTS:        REVIEW OF SYSTEMS:  CONSTITUTIONAL:  no  complaints    NECK: No pain or stiffnes  RESPIRATORY: No SOB   CARDIOVASCULAR: No chest pain, palpitations, dizziness,   GASTROINTESTINAL: No abdominal pain. No nausea, vomiting,   NEUROLOGICAL: No headaches, no  blurry  vision no  dizziness  SKIN: No itching,   MUSCULOSKELETAL: No pain    MEDICATION:  acetaminophen     Tablet .. 650 milliGRAM(s) Oral every 6 hours PRN  aMIOdarone    Tablet 200 milliGRAM(s) Oral daily  amLODIPine   Tablet 10 milliGRAM(s) Oral daily  aspirin enteric coated 81 milliGRAM(s) Oral daily  atorvastatin 80 milliGRAM(s) Oral at bedtime  doxazosin 4 milliGRAM(s) Oral daily  heparin   Injectable 5000 Unit(s) SubCutaneous every 8 hours  melatonin 3 milliGRAM(s) Oral at bedtime  metoprolol tartrate 25 milliGRAM(s) Oral every 6 hours  pantoprazole    Tablet 40 milliGRAM(s) Oral before breakfast  senna 2 Tablet(s) Oral at bedtime    Vital Signs Last 24 Hrs  T(C): 36.6 (16 Nov 2022 16:53), Max: 37.2 (16 Nov 2022 10:43)  T(F): 97.9 (16 Nov 2022 16:53), Max: 98.9 (16 Nov 2022 10:43)  HR: 66 (16 Nov 2022 16:53) (61 - 100)  BP: 166/67 (16 Nov 2022 16:53) (128/58 - 195/70)  BP(mean): 100 (16 Nov 2022 16:53) (100 - 100)  RR: 19 (16 Nov 2022 16:53) (18 - 19)  SpO2: 96% (16 Nov 2022 16:53) (95% - 99%)    Parameters below as of 16 Nov 2022 16:53  Patient On (Oxygen Delivery Method): room air        PHYSICAL EXAM:  GENERAL: NAD, well-groomed, well-developed  HEENT : Conjuntivae  clear sclerae anicteric  NECK: Supple, No JVD, Normal thyroid  NERVOUS SYSTEM:  Alert oriented x2  no  focal  deficits;   CHEST/LUNG: Clear    HEART: Regular rate and rhythm; No murmurs, rubs, or gallops  ABDOMEN: Soft, Nontender, Nondistended; Bowel sounds present  EXTREMITIES:  no  edema no  tenderness  SKIN: No rashes   LABS:                        12.1   8.77  )-----------( 232      ( 16 Nov 2022 12:17 )             38.1     11-16    145  |  116<H>  |  46<H>  ----------------------------<  114<H>  4.3   |  21<L>  |  2.37<H>    Ca    9.0      16 Nov 2022 12:17    TPro  7.2  /  Alb  2.8<L>  /  TBili  0.5  /  DBili  x   /  AST  12<L>  /  ALT  15  /  AlkPhos  81  11-16        CAPILLARY BLOOD GLUCOSE          RADIOLOGY & ADDITIONAL TESTS:    Imaging reports  Personally Reviewed:  [ ] YES  [ ] NO    Consultant(s) Notes Reviewed:  [x ] YES  [ ] NO    Care Discussed with Consultants/Other Providers [x ] YES  [ ] NO  tiProblem/Plan - 1:  ·  Problem: Acute UTI.   ·  Plan: rocephin.   Problem/Plan - 2:  ·  Problem: Benign prostate hyperplasia.   ·  Plan: cardura.  non  obstructive Rt  renal  stone    Problem/Plan - 3:  ·  Problem: PAF (paroxysmal atrial fibrillation).   ·  Plan: metoprolol  amiodarone.    Problem/Plan - 4:  ·  Problem: Hypertension.   ·  Plan: norvasc     Problem/Plan - 5:  ·  Problem: Unsteady gait.   ·  Plan: PT  eval.  CKD  nephrology  eval  appreciated  elevated  troponin   unclear  significance  in  setting  of  decreased  GFR  and  no  cardiac  symptoms trending  downward  will continue to  monitor  recheck  EKG      
    INTERVAL HPI/OVERNIGHT EVENTS:        REVIEW OF SYSTEMS:  CONSTITUTIONAL:  patient  confused  aggressive  states  he's  being  held  prisoner     reported with  L  wrist  pain    NECK: No pain or stiffnes  RESPIRATORY: No SOB   CARDIOVASCULAR: No chest pain, palpitations, dizziness,   GASTROINTESTINAL: No abdominal pain. No nausea, vomiting,   NEUROLOGICAL: No headaches, no  blurry  vision no  dizziness  SKIN: No itching,   MUSCULOSKELETAL:    MEDICATION:  acetaminophen     Tablet .. 650 milliGRAM(s) Oral every 6 hours PRN  aMIOdarone    Tablet 200 milliGRAM(s) Oral daily  amLODIPine   Tablet 10 milliGRAM(s) Oral daily  aspirin enteric coated 81 milliGRAM(s) Oral daily  atorvastatin 80 milliGRAM(s) Oral at bedtime  doxazosin 4 milliGRAM(s) Oral daily  heparin   Injectable 5000 Unit(s) SubCutaneous every 8 hours  melatonin 3 milliGRAM(s) Oral at bedtime  metoprolol tartrate 25 milliGRAM(s) Oral every 6 hours  pantoprazole    Tablet 40 milliGRAM(s) Oral before breakfast  senna 2 Tablet(s) Oral at bedtime  sodium chloride 0.45%. 1000 milliLiter(s) IV Continuous <Continuous>    Vital Signs Last 24 Hrs  T(C): 36.8 (18 Nov 2022 10:05), Max: 37 (17 Nov 2022 11:00)  T(F): 98.2 (18 Nov 2022 10:05), Max: 98.6 (17 Nov 2022 11:00)  HR: 70 (18 Nov 2022 10:05) (59 - 72)  BP: 126/62 (18 Nov 2022 10:05) (126/62 - 168/67)  BP(mean): 94 (18 Nov 2022 04:58) (94 - 101)  RR: 18 (18 Nov 2022 10:05) (18 - 20)  SpO2: 95% (18 Nov 2022 10:05) (95% - 97%)    Parameters below as of 18 Nov 2022 10:05  Patient On (Oxygen Delivery Method): room air        PHYSICAL EXAM:  GENERAL: NAD, well-groomed, well-developed  HEENT : Conjuntivae  clear sclerae anicteric  NECK: Supple, No JVD, Normal thyroid  NERVOUS SYSTEM:  Alert oriented x1  uncooperative moves  all  extr  CHEST/LUNG: Clear    HEART: Regular rate and rhythm; No murmurs, rubs, or gallops  ABDOMEN: Soft, Nontender, Nondistended; Bowel sounds present  EXTREMITIES:  mild  tenderness  L  wrist    SKIN: No rashes   LABS:                        11.9   10.70 )-----------( 221      ( 18 Nov 2022 07:30 )             38.1     11-18    144  |  113<H>  |  56<H>  ----------------------------<  120<H>  4.2   |  25  |  2.49<H>    Ca    9.4      18 Nov 2022 07:30    TPro  7.3  /  Alb  2.4<L>  /  TBili  0.6  /  DBili  x   /  AST  14<L>  /  ALT  17  /  AlkPhos  89  11-18        CAPILLARY BLOOD GLUCOSE          RADIOLOGY & ADDITIONAL TESTS:    Imaging reports  Personally Reviewed:  [ ] YES  [ ] NO    Consultant(s) Notes Reviewed:  [x ] YES  [ ] NO    Care Discussed with Consultants/Other Providers [x ] YES  [ ] NO  iProblem/Plan - 1:  ·  Problem: Acute UTI.   ·  Plan: continue rocephin. ID  eval 2/2 worsening  inflammatory  parameters   Problem/Plan - 2:  ·  Problem: Benign prostate hyperplasia.   ·  Plan: cardura.  L  wrist  pain xr  check  uric  acid  levels  acute  delirium redirection  continue  to  monitor  labs  non  obstructive Rt  renal  stone    Problem/Plan - 3:  ·  Problem: PAF (paroxysmal atrial fibrillation).   ·  Plan: metoprolol  amiodarone.    Problem/Plan - 4:  ·  Problem: Hypertension.   ·  Plan: norvasc     Problem/Plan - 5:  ·  Problem: Unsteady gait.   ·  Plan: PT  eval.  CKD  nephrology  eval  appreciated    hx  CAD  elevated  troponin   unclear  significance  in  setting  of  decreased  GFR  and  no  cardiac  symptoms trending  downward  repeat   EKG no  changes  (RBBB old )  will  check  TTE  befoire  discharge  discussed  with  pt's  out patient  cardiologist  hanna Bauer  will communicate  office  findings        
NEPHROLOGY PROGRESS NOTE    CHIEF COMPLAINT:  CKD    HPI:  Renal function a little worse.  BP variably elevated.    EXAM:  T(F): 98.6 (11-17-22 @ 11:00)  HR: 59 (11-17-22 @ 11:00)  BP: 152/67 (11-17-22 @ 11:00)  RR: 20 (11-17-22 @ 11:00)  SpO2: 97% (11-17-22 @ 11:00)    Somnolent  Normal respiratory effort, lungs clear bilaterally  Heart RRR with no murmur, no peripheral edema         LABS                             11.8   8.95  )-----------( 207      ( 17 Nov 2022 07:25 )             37.0          11-17    146<H>  |  115<H>  |  49<H>  ----------------------------<  128<H>  4.3   |  25  |  2.47<H>    Ca    9.2      17 Nov 2022 07:25    TPro  6.8  /  Alb  2.6<L>  /  TBili  0.6  /  DBili  x   /  AST  13<L>  /  ALT  14  /  AlkPhos  78  11-17        ASSESSMENT:  1.  CARLOS on CKD - possibly prerenal  2.  E. Coli UTI    PLAN:  Continue antibiotic  Start 1/2 NS x 24 hours        
Resting     Vital Signs Last 24 Hrs  T(C): 36.9 (11-19-22 @ 16:20), Max: 36.9 (11-19-22 @ 16:20)  T(F): 98.4 (11-19-22 @ 16:20), Max: 98.4 (11-19-22 @ 16:20)  HR: 63 (11-19-22 @ 16:20) (52 - 67)  BP: 138/71 (11-19-22 @ 16:20) (124/62 - 157/71)  BP(mean): 99 (11-19-22 @ 04:56) (94 - 99)  RR: 18 (11-19-22 @ 16:20) (17 - 19)  SpO2: 100% (11-19-22 @ 16:20) (96% - 100%)    s1s2  b/l air entry  soft, ND  no edema                        10.8   8.73  )-----------( 234      ( 19 Nov 2022 06:30 )             34.2     19 Nov 2022 06:30    144    |  115    |  66     ----------------------------<  135    4.2     |  21     |  2.72     Ca    9.2        19 Nov 2022 06:30    TPro  6.8    /  Alb  2.3    /  TBili  0.4    /  DBili  x      /  AST  15     /  ALT  23     /  AlkPhos  81     19 Nov 2022 06:30    LIVER FUNCTIONS - ( 19 Nov 2022 06:30 )  Alb: 2.3 g/dL / Pro: 6.8 gm/dL / ALK PHOS: 81 U/L / ALT: 23 U/L / AST: 15 U/L / GGT: x           acetaminophen     Tablet .. 650 milliGRAM(s) Oral every 6 hours PRN  aMIOdarone    Tablet 200 milliGRAM(s) Oral daily  amLODIPine   Tablet 10 milliGRAM(s) Oral daily  aspirin enteric coated 81 milliGRAM(s) Oral daily  atorvastatin 80 milliGRAM(s) Oral at bedtime  cefTRIAXone   IVPB      cefTRIAXone   IVPB 1000 milliGRAM(s) IV Intermittent every 24 hours  doxazosin 4 milliGRAM(s) Oral daily  heparin   Injectable 5000 Unit(s) SubCutaneous every 8 hours  lactobacillus acidophilus 1 Tablet(s) Oral every 12 hours  melatonin 3 milliGRAM(s) Oral at bedtime  metoprolol tartrate 25 milliGRAM(s) Oral every 6 hours  pantoprazole    Tablet 40 milliGRAM(s) Oral before breakfast  senna 2 Tablet(s) Oral at bedtime    ASSESSMENT/PLAN:    Worsening renal fx  E. Coli UTI, abx  Gentle IVF  Avoid nephrotoxins  F/u [facility]    [phone]
Subjective: no complaints.       MEDICATIONS  (STANDING):  aMIOdarone    Tablet 200 milliGRAM(s) Oral daily  amLODIPine   Tablet 10 milliGRAM(s) Oral daily  aspirin enteric coated 81 milliGRAM(s) Oral daily  atorvastatin 80 milliGRAM(s) Oral at bedtime  cefTRIAXone   IVPB 1000 milliGRAM(s) IV Intermittent every 24 hours  doxazosin 4 milliGRAM(s) Oral daily  heparin   Injectable 5000 Unit(s) SubCutaneous every 8 hours  melatonin 3 milliGRAM(s) Oral at bedtime  metoprolol tartrate 25 milliGRAM(s) Oral every 6 hours  pantoprazole    Tablet 40 milliGRAM(s) Oral before breakfast  senna 2 Tablet(s) Oral at bedtime    MEDICATIONS  (PRN):  acetaminophen     Tablet .. 650 milliGRAM(s) Oral every 6 hours PRN Mild Pain (1 - 3)          T(C): 37.2 (11-16-22 @ 10:43), Max: 37.2 (11-16-22 @ 10:43)  HR: 61 (11-16-22 @ 12:00) (54 - 100)  BP: 128/58 (11-16-22 @ 12:00) (128/58 - 195/70)  RR: 18 (11-16-22 @ 12:00) (18 - 19)  SpO2: 95% (11-16-22 @ 12:00) (95% - 99%)  Wt(kg): --        I&O's Detail    15 Nov 2022 07:01  -  16 Nov 2022 07:00  --------------------------------------------------------  IN:    IV PiggyBack: 50 mL    Oral Fluid: 240 mL  Total IN: 290 mL    OUT:    Incontinent per Condom Catheter (mL): 1700 mL  Total OUT: 1700 mL    Total NET: -1410 mL               PHYSICAL EXAM:    GENERAL: NAD  NECK: Supple, no inc in JVP  CHEST/LUNG: Clear  HEART: S1S2  ABDOMEN: Soft  EXTREMITIES:  no edema      LABS:  CBC Full  -  ( 15 Nov 2022 06:02 )  WBC Count : 9.26 K/uL  RBC Count : 4.27 M/uL  Hemoglobin : 12.0 g/dL  Hematocrit : 37.2 %  Platelet Count - Automated : 203 K/uL  Mean Cell Volume : 87.1 fl  Mean Cell Hemoglobin : 28.1 pg  Mean Cell Hemoglobin Concentration : 32.3 g/dL  Auto Neutrophil # : x  Auto Lymphocyte # : x  Auto Monocyte # : x  Auto Eosinophil # : x  Auto Basophil # : x  Auto Neutrophil % : x  Auto Lymphocyte % : x  Auto Monocyte % : x  Auto Eosinophil % : x  Auto Basophil % : x    11-15    142  |  112<H>  |  45<H>  ----------------------------<  99  4.0   |  22  |  2.35<H>    Ca    9.0      15 Nov 2022 06:02    TPro  6.8  /  Alb  2.8<L>  /  TBili  0.7  /  DBili  x   /  AST  13<L>  /  ALT  14  /  AlkPhos  79  11-15        ASSESSMENT:  1.  Azotemia - probably CKD  2.  E. Coli UTI    PLAN:  Antibiotics as per medicine  Cont to monitor Cr                
TMAX - 97.9    On day # 5 Rocephin / s/p Vanco x 2    Vital Signs Last 24 Hrs  T(C): 36.3 (22 Nov 2022 04:51), Max: 36.6 (21 Nov 2022 23:07)  T(F): 97.3 (22 Nov 2022 04:51), Max: 97.9 (21 Nov 2022 23:07)  HR: 52 (22 Nov 2022 10:46) (52 - 62)  BP: 93/58 (22 Nov 2022 10:46) (93/58 - 168/68)  RR: 18 (22 Nov 2022 10:46) (18 - 18)  SpO2: 98% (22 Nov 2022 10:46) (98% - 99%)  Parameters below as of 21 Nov 2022 21:26  Patient On (Oxygen Delivery Method): room air  Supplemental O2:  on RA    Awake, alert, no c/o cp, SOB, or abdominal pain now but still with some pain in the Lt hand although slowly improving.  O2 Sat's remain between 98 - 99% on RA.      PHYSICAL EXAM  General: 89 y/o  white male awake, alert, lying in bed in semi-reclining position, appears more comfortable today and in NAD  HEENT: conj pink, sclerae anicteric, PERRLA, no oral lesions noted and mucosa moist  Neck: supple, no nodes noted  Heart: RR  Lungs: clear bilaterally  Abdomen: soft, BS+, nontender to palpation, no masses or HS-megaly detected  Back: no CVA or Spinal tenderness elicited to palpation  Extremities: no edema LE's                    no calf tenderness LE's                    Lt hand with mild swelling and warmth along with mild erythema now on the dorsum of the  hand - mildly -tender to palpation now  - mproved ROM of the wrist and fingers                     Arthritic changes of both hands noted  Skin: warm, dry no rash noted  Condom Catheter in place and draining clear yellow urine now - Urine Output recorded as 850 cc over the prior 24hrs.    I&O's Summary :    21 Nov 2022 07:01  -  22 Nov 2022 07:00  --------------------------------------------------------  IN: 0 mL / OUT: 850 mL / NET: -850 mL      LABS:  CBC Full  -  ( 22 Nov 2022 06:00 )  WBC Count : 8.18 K/uL  RBC Count : 4.12 M/uL  Hemoglobin : 11.4 g/dL  Hematocrit : 37.0 %  Platelet Count - Automated : 299 K/uL  Mean Cell Volume : 89.8 fl  Mean Cell Hemoglobin : 27.7 pg  Mean Cell Hemoglobin Concentration : 30.8 g/dL  Auto Neutrophil # : x  Auto Lymphocyte # : x  Auto Monocyte # : x  Auto Eosinophil # : x  Auto Basophil # : x  Auto Neutrophil % : x  Auto Lymphocyte % : x  Auto Monocyte % : x  Auto Eosinophil % : x  Auto Basophil % : x    11-22    143  |  114<H>  |  71<H>  ----------------------------<  99  4.6   |  23  |  2.47<H>    Ca    9.3      22 Nov 2022 06:00    TPro  7.5  /  Alb  2.7<L>  /  TBili  0.3  /  DBili  x   /  AST  27  /  ALT  41  /  AlkPhos  98  11-21    LIVER FUNCTIONS - ( 21 Nov 2022 05:45 )  Alb: 2.7 g/dL / Pro: 7.5 gm/dL / ALK PHOS: 98 U/L / ALT: 41 U/L / AST: 27 U/L / GGT: x           Sedimentation Rate, Erythrocyte: 85 mm/hr (11-21 @ 05:45)  Sedimentation Rate, Erythrocyte: 87 mm/hr (11-20 @ 07:10)  Sedimentation Rate, Erythrocyte: 88 mm/hr (11-19 @ 06:30)  Sedimentation Rate, Erythrocyte: 79 mm/hr (11-18 @ 07:30)  Sedimentation Rate, Erythrocyte: 56 mm/hr (11-17 @ 07:25)    C-Reactive Protein, Serum (11.21.22 @ 05:45)    C-Reactive Protein, Serum: 52 mg/L  C-Reactive Protein, Serum (11.19.22 @ 06:30)    C-Reactive Protein, Serum: 150 mg/L  C-Reactive Protein, Serum (11.17.22 @ 08:00)    C-Reactive Protein, Serum: 136 mg/L  C-Reactive Protein, Serum (11.14.22 @ 19:50)    C-Reactive Protein, Serum: 38 mg/L    Procalcitonin, Serum (11.21.22 @ 05:45)    Procalcitonin, Serum: 0.21:     Procalcitonin, Serum (11.19.22 @ 06:30)    Procalcitonin, Serum: 0.41:     Uric Acid, Serum (11.18.22 @ 07:45)    Uric Acid, Serum: 8.1 mg/dL    MICROBIOLOGY:  Flu With COVID-19 By RALPH (11.21.22 @ 19:40)    SARS-CoV-2 Result: NotDetec: EUA/IVD    Influenza A Result: NotDetec: EUA/IVD    Influenza B Result: NotDetec: EUA/IVD    Specimen Source: .Blood Blood (11-18 @ 18:10)  Culture Results:   No growth to date. (11-18 @ 18:10)    Specimen Source: .Blood Blood (11-18 @ 18:00)  Culture Results:   No growth to date. (11-18 @ 18:00)      Culture Results:   No growth to date. (11-18 @ 18:00)      Specimen Source: Clean Catch Clean Catch (Midstream) (11-13 @ 12:00)  Culture Results:   >100,000 CFU/ml Escherichia coli (11-13 @ 12:00)     -  Amikacin: S <=16    -  Amoxicillin/Clavulanic Acid: S <=8/4    -  Ampicillin: R >16 These ampicillin results predict results for amoxicillin    -  Ampicillin/Sulbactam: S 8/4 Enterobacter, Klebsiella aerogenes, Citrobacter, and Serratia may develop resistance during prolonged therapy (3-4 days)    -  Aztreonam: S <=4    -  Cefazolin: S <=2 For uncomplicated UTI with K. pneumoniae, E. coli, or P. mirablis: LUIS <=16 is sensitive and LUIS >=32 is resistant. This also predicts results for oral agents cefaclor, cefdinir, cefpodoxime, cefprozil, cefuroxime axetil, cephalexin and locarbef for uncomplicated UTI. Note that some isolates may be susceptible to these agents while testing resistant to cefazolin.    -  Cefepime: S <=2    -  Cefoxitin: S <=8    -  Ceftriaxone: S <=1 Enterobacter, Klebsiella aerogenes, Citrobacter, and Serratia may develop resistance during prolonged therapy    -  Ciprofloxacin: S <=0.25    -  Ertapenem: S <=0.5    -  Gentamicin: R >8    -  Imipenem: S <=1    -  Levofloxacin: S <=0.5    -  Meropenem: S <=1    -  Nitrofurantoin: S <=32 Should not be used to treat pyelonephritis    -  Piperacillin/Tazobactam: S <=8    -  Tobramycin: I 8    -  Trimethoprim/Sulfamethoxazole: R >2/38    Method Type: LUIS      Flu With COVID-19 By RALPH (11.13.22 @ 17:07)    SARS-CoV-2 Result: NotDetec: EUA/IVD    Influenza A Result: NotDetec: EUA/IVD    Influenza B Result: NotDetec: EUA/IVD     RADIOLOGY:  < from: Xray Wrist 3 Views, Left (11.18.22 @ 14:36) >  ACC: [id number] EXAM:  XR WRIST COMP MIN 3 VIEWS LT                        PROCEDURE DATE:  11/18/2022    INTERPRETATION:  clinical history: Pain  Left wrist 3 views  No fracture or dislocation. No focal osseous lesion. Degenerative changes   seen at the radiocarpal joint.  IMPRESSION:   No fracture. Mild degenerative change    < from: US Kidney and Bladder (11.15.22 @ 11:07) >  ACC: [id number] EXAM:  US KIDNEYS AND BLADDER                        PROCEDURE DATE:  11/15/2022    INTERPRETATION:  CLINICAL INFORMATION: Urinary incontinence, Hx UTI.  COMPARISON: Abdominal ultrasound from 1/28/2018.  TECHNIQUE: Sonography of the kidneys and bladder.  FINDINGS:  Right kidney: 11.4 cm. Multiple simple renal cysts, largest of which   measures 2.2 cm in the lower pole. In addition there is a complex cyst   with septations in the lower pole and measures 1.9 x 1.5 x 2.2 cm and is   stable. There is a 0.7 cm nonobstructive renal calculus in the midpole   region. No hydronephrosis.  Left kidney: 10.0 cm. Multiple renal cysts, largest of which is in the   lower pole and measures 2.3 cm. No renal calculi or hydronephrosis.  Urinary bladder: Within normal limits.  Prostate: Enlarged. Measures 4.7 x 3.4 x 4.3 cm, with an estimated volume   of 45 mL.  IMPRESSION:  Bilateral renal cysts.  Nonobstructing right renal calculus.  Prostate gland enlargement.    < from: Xray Chest 1 View- PORTABLE-Urgent (Xray Chest 1 View- PORTABLE-Urgent .) (11.13.22 @ 11:41) >  ACC: [id number] EXAM:  XR CHEST PORTABLE URGENT 1V                        PROCEDURE DATE:  11/13/2022    INTERPRETATION:  AP chest.  CLINICAL INDICATION: Hypertension  IMPRESSION: Cardiac silhouette is enlarged. The lungs are grossly clear.   Degenerative changes are noted of the bones.    ECHO:  < from: TTE Echo Complete w/o Contrast w/ Doppler (11.20.22 @ 11:37) >  PHYSICIAN INTERPRETATION:  Left Ventricle: Normal left ventricular size and wall thicknesses, with   normal systolic and diastolic function.  Global LV systolic function was mildly decreased. Left ventricular   ejection fraction, by visual estimation, is 45 to 50%. Spectral Doppler   shows normal pattern of LV diastolic filling.  LV Wall Scoring:  The basal and mid inferior septum is hypokinetic.  Right Ventricle: Normal right ventricular size and function.  Left Atrium: Mildly enlarged left atrium.  Right Atrium: The right atrium is normal in size.  Pericardium: There is no evidence of pericardial effusion.  Mitral Valve: Structurally normal mitral valve, with normal leaflet   excursion. Trace mitral valve regurgitation is seen.  Tricuspid Valve: Structurally normal tricuspid valve, with normal leaflet   excursion. Trivial tricuspid regurgitation is visualized.  Aortic Valve: Normal trileaflet aortic valve with normal opening. Trivial   aortic valve regurgitation is seen.  Pulmonic Valve: Structurally normal pulmonic valve, with normal leaflet   excursion. No indication of pulmonic valve regurgitation.  Aorta: The aortic root and ascending aorta are structurally normal, with   no evidence of dilitation.  Pulmonary Artery: The main pulmonary artery is normal in size.    Summary:   1. Left ventricular ejection fraction, by visual estimation, is 45 to   50%.   2. Mildlydecreased global left ventricular systolic function.   3. Basal and mid inferior septum is abnormal as described above.   4. Mildly enlarged left atrium.   5. Trace mitral valve regurgitation.    Impression:  89 y/o white male with hx of HTN. HLD, Paroxysmal AFib, s/p MI in 2018,  s/p AAA Repair with Endovascular Stent Grafting of  an Infrarenal AAA along with Bilateral Common Iliac Artery Stent Grafting about 10yrs. ago, Hemorrhoids, BPH with urinary incontinence, admitted via the ER to [facility] on 11/13/22 with c/o worsening urinary incontinence along with increased urinary frequency since the early AM.  He denied any fevers or chills at that time and in the ER was found to be afebrile and with WBC's WNL.  W/u with U/A with Micro revealed marked pyuria with >50WBC's, >50 RBC's, and LE was positive.  Urine Cx ws obtained and he was begun empirically on Rocephin which was completed on 11/16/22 after Urine  Cx  subseqeuntly grew >100.000 EColi which was sensitive to the Rocephin.  Further w/u with CXR was done and negative for any infiltrates and  Renal Sono was done and reported with bilateral Renal Cysts, a Rt. Renal non-obstructing Calculus, and an enlarged Prostate.  Patient has remained afebrile but over the past few days has been noted to have increased ESR up to 79 on 11/18/22 and increased CRP up to 167.  Also noted with slightly increased WBC's to 10,700  on 11/18/22 and the patient began to c/o pain in his Lt. Hand. He denies any hx of trauma and ? had 1 prior episode of Gout in his foot many yrs. ago.  Patient also noted with elevated BUN/ Creat since admission consistent with CKD.  Noted Uric Acid done 11/18/22 is WNL. Now with Cellulitis/ ? early abscess of the Lt hand - ? etiology?  Today noted again with continued improvement clinically although ESR remains elevated but CRP and Procalcitonin are trending downwards now.  WBC's are decreased and patient remains afebrile.  Blood Cx's remain negative thus far.  Noted XRay of the hand with no fx but some degenerative changes seen.    Suggestions:  Will therefore continue present ab rx with Rocephin and would have no objection from ID standpoint to change to po Keflex or Duricef when otherwise ready for d/c to Rehab to continue rx for another 5-7 days depending upon continued clinical improvement.  Follow-up temps and labs.  Maintain Lt. Hand elevated.  Discussed with patient at the bedside and discussed with Dr. Lord via phone.
TMAX - 98.3    On day # 2 Rocephin / s/p Vanco x 1 dose yesterday    Vital Signs Last 24 Hrs  T(C): 36.9 (19 Nov 2022 16:20), Max: 36.9 (19 Nov 2022 16:20)  T(F): 98.4 (19 Nov 2022 16:20), Max: 98.4 (19 Nov 2022 16:20)  HR: 63 (19 Nov 2022 16:20) (52 - 67)  BP: 138/71 (19 Nov 2022 16:20) (124/62 - 157/71)  BP(mean): 99 (19 Nov 2022 04:56) (94 - 99)  RR: 18 (19 Nov 2022 16:20) (17 - 19)  SpO2: 100% (19 Nov 2022 16:20) (96% - 100%)  Parameters below as of 19 Nov 2022 16:20  Patient On (Oxygen Delivery Method): room air  Supplemental O2:  on RA    Awake, alert, feeling better today but still with some pain Lt. hand although somewhat improved.  No c/o cp, SOB, or abdominal pain and claims his appetite is good today. O2 Sat's remain between 95 -98% on RA.    PHYSICAL EXAM  General: 89 y/o  white male awake, alert, lying in bed in semi-reclining position, appears more comfortable today and in NAD  HEENT: conj pink, sclerae anicteric, PERRLA, no oral lesions noted and mucosa moist  Neck: supple, no nodes noted  Heart: RR  Lungs: clear bilaterally  Abdomen: soft, BS+, nontender to palpation, no masses or HS-megaly detected  Back: no CVA or Spinal tenderness elicited to palpation  Extremities: no edema LE's                    no calf tenderness LE's                    Lt hand with some decreased swelling and warmth along with slightly less intense erythema on the dorsum of the hand with mild tenderness to palpation now  and improved ROM of the wrist and fingers                     Arthritic changes of both hands noted  Skin: warm, dry no rash noted  Condom Catheter in place and draining clear yellow urine now - Urine Output recorded as 850 cc over the prior 24hrs.    I&O's Summary    LABS:  CBC Full  -  ( 19 Nov 2022 06:30 )  WBC Count : 8.73 K/uL  RBC Count : 3.86 M/uL  Hemoglobin : 10.8 g/dL  Hematocrit : 34.2 %  Platelet Count - Automated : 234 K/uL  Mean Cell Volume : 88.6 fl  Mean Cell Hemoglobin : 28.0 pg  Mean Cell Hemoglobin Concentration : 31.6 g/dL  Auto Neutrophil # : 6.68 K/uL  Auto Lymphocyte # : 1.24 K/uL  Auto Monocyte # : 0.55 K/uL  Auto Eosinophil # : 0.14 K/uL  Auto Basophil # : 0.05 K/uL  Auto Neutrophil % : 76.5 %  Auto Lymphocyte % : 14.2 %  Auto Monocyte % : 6.3 %  Auto Eosinophil % : 1.6 %  Auto Basophil % : 0.6 %    11-19    144  |  115<H>  |  66<H>  ----------------------------<  135<H>  4.2   |  21<L>  |  2.72<H>    Ca    9.2      19 Nov 2022 06:30    TPro  6.8  /  Alb  2.3<L>  /  TBili  0.4  /  DBili  x   /  AST  15  /  ALT  23  /  AlkPhos  81  11-19    LIVER FUNCTIONS - ( 19 Nov 2022 06:30 )  Alb: 2.3 g/dL / Pro: 6.8 gm/dL / ALK PHOS: 81 U/L / ALT: 23 U/L / AST: 15 U/L / GGT: x           Sedimentation Rate, Erythrocyte: 88 mm/hr (11-19 @ 06:30)  Sedimentation Rate, Erythrocyte: 79 mm/hr (11-18 @ 07:30)  Sedimentation Rate, Erythrocyte: 56 mm/hr (11-17 @ 07:25)    C-Reactive Protein, Serum (11.19.22 @ 06:30)    C-Reactive Protein, Serum: 150 mg/L    C-Reactive Protein, Serum (11.17.22 @ 08:00)    C-Reactive Protein, Serum: 136 mg/L    C-Reactive Protein, Serum (11.14.22 @ 19:50)    C-Reactive Protein, Serum: 38 mg/L    Procalcitonin, Serum (11.19.22 @ 06:30)    Procalcitonin, Serum: 0.41:     Uric Acid, Serum (11.18.22 @ 07:45)    Uric Acid, Serum: 8.1 mg/dL    Vancomycin Level, Trough: 9.4 ug/mL (11-19 @ 06:30)    MICROBIOLOGY:  Specimen Source: Clean Catch Clean Catch (Midstream) (11-13 @ 12:00)  Culture Results:   >100,000 CFU/ml Escherichia coli (11-13 @ 12:00)     -  Amikacin: S <=16    -  Amoxicillin/Clavulanic Acid: S <=8/4    -  Ampicillin: R >16 These ampicillin results predict results for amoxicillin    -  Ampicillin/Sulbactam: S 8/4 Enterobacter, Klebsiella aerogenes, Citrobacter, and Serratia may develop resistance during prolonged therapy (3-4 days)    -  Aztreonam: S <=4    -  Cefazolin: S <=2 For uncomplicated UTI with K. pneumoniae, E. coli, or P. mirablis: LUIS <=16 is sensitive and LUIS >=32 is resistant. This also predicts results for oral agents cefaclor, cefdinir, cefpodoxime, cefprozil, cefuroxime axetil, cephalexin and locarbef for uncomplicated UTI. Note that some isolates may be susceptible to these agents while testing resistant to cefazolin.    -  Cefepime: S <=2    -  Cefoxitin: S <=8    -  Ceftriaxone: S <=1 Enterobacter, Klebsiella aerogenes, Citrobacter, and Serratia may develop resistance during prolonged therapy    -  Ciprofloxacin: S <=0.25    -  Ertapenem: S <=0.5    -  Gentamicin: R >8    -  Imipenem: S <=1    -  Levofloxacin: S <=0.5    -  Meropenem: S <=1    -  Nitrofurantoin: S <=32 Should not be used to treat pyelonephritis    -  Piperacillin/Tazobactam: S <=8    -  Tobramycin: I 8    -  Trimethoprim/Sulfamethoxazole: R >2/38    Method Type: LUIS      Flu With COVID-19 By RALPH (11.13.22 @ 17:07)    SARS-CoV-2 Result: NotDetec: EUA/IVD    Influenza A Result: NotDetec: EUA/IVD    Influenza B Result: NotDetec: EUA/IVD     RADIOLOGY:  < from: US Kidney and Bladder (11.15.22 @ 11:07) >  ACC: [id number] EXAM:  US KIDNEYS AND BLADDER                        PROCEDURE DATE:  11/15/2022    INTERPRETATION:  CLINICAL INFORMATION: Urinary incontinence, Hx UTI.  COMPARISON: Abdominal ultrasound from 1/28/2018.  TECHNIQUE: Sonography of the kidneys and bladder.  FINDINGS:  Right kidney: 11.4 cm. Multiple simple renal cysts, largest of which   measures 2.2 cm in the lower pole. In addition there is a complex cyst   with septations in the lower pole and measures 1.9 x 1.5 x 2.2 cm and is   stable. There is a 0.7 cm nonobstructive renal calculus in the midpole   region. No hydronephrosis.  Left kidney: 10.0 cm. Multiple renal cysts, largest of which is in the   lower pole and measures 2.3 cm. No renal calculi or hydronephrosis.  Urinary bladder: Within normal limits.  Prostate: Enlarged. Measures 4.7 x 3.4 x 4.3 cm, with an estimated volume   of 45 mL.  IMPRESSION:  Bilateral renal cysts.  Nonobstructing right renal calculus.  Prostate gland enlargement.      < from: Xray Chest 1 View- PORTABLE-Urgent (Xray Chest 1 View- PORTABLE-Urgent .) (11.13.22 @ 11:41) >  ACC: [id number] EXAM:  XR CHEST PORTABLE URGENT 1V                        PROCEDURE DATE:  11/13/2022    INTERPRETATION:  AP chest.  CLINICAL INDICATION: Hypertension  IMPRESSION: Cardiac silhouette is enlarged. The lungs are grossly clear.   Degenerative changes are noted of the bones.    Impression:  89 y/o white male with hx of HTN. HLD, Paroxysmal AFib, s/p MI in 2018,  s/p AAA Repair with Endovascular Stent Grafting of  an Infrarenal AAA along with Bilateral Common Iliac Artery Stent Grafting about 10yrs. ago, Hemorrhoids, BPH with urinary incontinence, admitted via the ER to [facility] on 11/13/22 with c/o worsening urinary incontinence along with increased urinary frequency since the early AM.  He denied any fevers or chills at that time and in the ER was found to be afebrile and with WBC's WNL.  W/u with U/A with Micro revealed marked pyuria with >50WBC's, >50 RBC's, and LE was positive.  Urine Cx ws obtained and he was begun empirically on Rocephin which was completed on 11/16/22 after Urine  Cx  subseqeuntly grew >100.000 EColi which was sensitive to the Rocephin.  Further w/u with CXR was done and negative for any infiltrates and  Renal Sono was done and reported with bilateral Renal Cysts, a Rt. Renal non-obstructing Calculus, and an enlarged Prostate.  Patient has remained afebrile but over the past few days has been noted to have increased ESR up to 79 on 11/18/22 and increased CRP up to 167.  Also noted with slightly increased WBC's to 10,700  on 11/18/22 and the patient began to c/o pain in his Lt. Hand. He denies any hx of trauma and ? had 1 prior episode of Gout in his foot many yrs. ago.  Patient also noted with elevated BUN/ Creat since admission consistent with CKD.  Noted Uric Acid done 11/18/22 is WNL. Now with Cellulitis/ ? early abscess of the Lt hand - ? etiology?  Today noted with some improvement clinically although ESR, CRP and Procalcitonin are increased but WBC's are decreased and patient remains afebrile.     Suggestions:  Will therefore continue present ab rx with Rocephin and rx with Vanco x 1 more dose today  pending Cx results and continued clinical response.  Await XRay of the Lt hand - may need to consider further w/u with CT or MRI of the Lt Hand to r/o early abscess formation.  Follow-up temps and labs closely.  Maintain Lt. Hand elevated.  Discussed in detail with patient and his daughter at the bedside.  
TMAX - 98.6    On day # 4 Rocephin / s/p Vanco x 2    Vital Signs Last 24 Hrs  T(C): 36.3 (21 Nov 2022 10:36), Max: 36.3 (20 Nov 2022 16:26)  T(F): 97.3 (21 Nov 2022 10:36), Max: 97.4 (20 Nov 2022 23:40)  HR: 54 (21 Nov 2022 11:37) (54 - 71)  BP: 120/62 (21 Nov 2022 11:37) (116/57 - 162/80)  RR: 18 (21 Nov 2022 11:37) (18 - 18)  SpO2: 97% (21 Nov 2022 11:37) (97% - 99%)  Parameters below as of 21 Nov 2022 11:37  Patient On (Oxygen Delivery Method): room air  Supplemental O2:  on RA    Awake, alert, c/o constipation and some nausea - unable to eat.  Otherwise has no c/o cp or SOB and claims his Lt hand is feeling better with no pain at present and increased mobility.    PHYSICAL EXAM  General: 89 y/o  white male awake, alert, lying in bed in semi-reclining position, appears more comfortable today and in NAD  HEENT: conj pink, sclerae anicteric, PERRLA, no oral lesions noted and mucosa moist  Neck: supple, no nodes noted  Heart: RR  Lungs: clear bilaterally  Abdomen: soft, BS+, nontender to palpation, no masses or HS-megaly detected  Back: no CVA or Spinal tenderness elicited to palpation  Extremities: no edema LE's                    no calf tenderness LE's                    Lt hand with decreased swelling and warmth along with only mild erythema now on the dorsum of the hand and non-tender to palpation now  and improved ROM of the wrist and fingers                     Arthritic changes of both hands noted  Skin: warm, dry no rash noted  Condom Catheter in place and draining clear yellow urine now - Urine Output recorded as 1300 cc over the prior 24hrs.    I&O's Summary :    LABS:  CBC Full  -  ( 21 Nov 2022 05:45 )  WBC Count : 8.09 K/uL  RBC Count : 4.03 M/uL  Hemoglobin : 11.2 g/dL  Hematocrit : 36.8 %  Platelet Count - Automated : 286 K/uL  Mean Cell Volume : 91.3 fl  Mean Cell Hemoglobin : 27.8 pg  Mean Cell Hemoglobin Concentration : 30.4 g/dL  Auto Neutrophil # : 6.19 K/uL  Auto Lymphocyte # : 1.17 K/uL  Auto Monocyte # : 0.44 K/uL  Auto Eosinophil # : 0.17 K/uL  Auto Basophil # : 0.06 K/uL  Auto Neutrophil % : 76.6 %  Auto Lymphocyte % : 14.5 %  Auto Monocyte % : 5.4 %  Auto Eosinophil % : 2.1 %  Auto Basophil % : 0.7 %    11-21  143  |  114<H>  |  86<H>  ----------------------------<  113<H>  4.7   |  21<L>  |  3.04<H>  Ca    9.5      21 Nov 2022 05:45  TPro  7.5  /  Alb  2.7<L>  /  TBili  0.3  /  DBili  x   /  AST  27  /  ALT  41  /  AlkPhos  98  11-21  LIVER FUNCTIONS - ( 21 Nov 2022 05:45 )  Alb: 2.7 g/dL / Pro: 7.5 gm/dL / ALK PHOS: 98 U/L / ALT: 41 U/L / AST: 27 U/L / GGT: x           Sedimentation Rate, Erythrocyte: 85 mm/hr (11-21 @ 05:45)  Sedimentation Rate, Erythrocyte: 87 mm/hr (11-20 @ 07:10)  Sedimentation Rate, Erythrocyte: 88 mm/hr (11-19 @ 06:30)  Sedimentation Rate, Erythrocyte: 79 mm/hr (11-18 @ 07:30)  Sedimentation Rate, Erythrocyte: 56 mm/hr (11-17 @ 07:25)    C-Reactive Protein, Serum (11.21.22 @ 05:45)    C-Reactive Protein, Serum: 52 mg/L  C-Reactive Protein, Serum (11.19.22 @ 06:30)    C-Reactive Protein, Serum: 150 mg/L  C-Reactive Protein, Serum (11.17.22 @ 08:00)    C-Reactive Protein, Serum: 136 mg/L  C-Reactive Protein, Serum (11.14.22 @ 19:50)    C-Reactive Protein, Serum: 38 mg/L    Procalcitonin, Serum (11.21.22 @ 05:45)    Procalcitonin, Serum: 0.21:     Procalcitonin, Serum (11.19.22 @ 06:30)    Procalcitonin, Serum: 0.41:     Uric Acid, Serum (11.18.22 @ 07:45)    Uric Acid, Serum: 8.1 mg/dL    Vancomycin Level, Trough: 9.4 ug/mL (11-19 @ 06:30)      MICROBIOLOGY:  Specimen Source: .Blood Blood (11-18 @ 18:10)  Culture Results:   No growth to date. (11-18 @ 18:10)    Specimen Source: .Blood Blood (11-18 @ 18:00)  Culture Results:   No growth to date. (11-18 @ 18:00)      Specimen Source: Clean Catch Clean Catch (Midstream) (11-13 @ 12:00)  Culture Results:   >100,000 CFU/ml Escherichia coli (11-13 @ 12:00)     -  Amikacin: S <=16    -  Amoxicillin/Clavulanic Acid: S <=8/4    -  Ampicillin: R >16 These ampicillin results predict results for amoxicillin    -  Ampicillin/Sulbactam: S 8/4 Enterobacter, Klebsiella aerogenes, Citrobacter, and Serratia may develop resistance during prolonged therapy (3-4 days)    -  Aztreonam: S <=4    -  Cefazolin: S <=2 For uncomplicated UTI with K. pneumoniae, E. coli, or P. mirablis: LUIS <=16 is sensitive and LUIS >=32 is resistant. This also predicts results for oral agents cefaclor, cefdinir, cefpodoxime, cefprozil, cefuroxime axetil, cephalexin and locarbef for uncomplicated UTI. Note that some isolates may be susceptible to these agents while testing resistant to cefazolin.    -  Cefepime: S <=2    -  Cefoxitin: S <=8    -  Ceftriaxone: S <=1 Enterobacter, Klebsiella aerogenes, Citrobacter, and Serratia may develop resistance during prolonged therapy    -  Ciprofloxacin: S <=0.25    -  Ertapenem: S <=0.5    -  Gentamicin: R >8    -  Imipenem: S <=1    -  Levofloxacin: S <=0.5    -  Meropenem: S <=1    -  Nitrofurantoin: S <=32 Should not be used to treat pyelonephritis    -  Piperacillin/Tazobactam: S <=8    -  Tobramycin: I 8    -  Trimethoprim/Sulfamethoxazole: R >2/38    Method Type: LUIS      Flu With COVID-19 By RALPH (11.13.22 @ 17:07)    SARS-CoV-2 Result: NotDetec: EUA/IVD    Influenza A Result: NotDetec: EUA/IVD    Influenza B Result: NotDetec: EUA/IVD     RADIOLOGY:  < from: Xray Wrist 3 Views, Left (11.18.22 @ 14:36) >  ACC: [id number] EXAM:  XR WRIST COMP MIN 3 VIEWS LT                        PROCEDURE DATE:  11/18/2022    INTERPRETATION:  clinical history: Pain  Left wrist 3 views  No fracture or dislocation. No focal osseous lesion. Degenerative changes   seen at the radiocarpal joint.  IMPRESSION:   No fracture. Mild degenerative change    < from: US Kidney and Bladder (11.15.22 @ 11:07) >  ACC: [id number] EXAM:  US KIDNEYS AND BLADDER                        PROCEDURE DATE:  11/15/2022    INTERPRETATION:  CLINICAL INFORMATION: Urinary incontinence, Hx UTI.  COMPARISON: Abdominal ultrasound from 1/28/2018.  TECHNIQUE: Sonography of the kidneys and bladder.  FINDINGS:  Right kidney: 11.4 cm. Multiple simple renal cysts, largest of which   measures 2.2 cm in the lower pole. In addition there is a complex cyst   with septations in the lower pole and measures 1.9 x 1.5 x 2.2 cm and is   stable. There is a 0.7 cm nonobstructive renal calculus in the midpole   region. No hydronephrosis.  Left kidney: 10.0 cm. Multiple renal cysts, largest of which is in the   lower pole and measures 2.3 cm. No renal calculi or hydronephrosis.  Urinary bladder: Within normal limits.  Prostate: Enlarged. Measures 4.7 x 3.4 x 4.3 cm, with an estimated volume   of 45 mL.  IMPRESSION:  Bilateral renal cysts.  Nonobstructing right renal calculus.  Prostate gland enlargement.    < from: Xray Chest 1 View- PORTABLE-Urgent (Xray Chest 1 View- PORTABLE-Urgent .) (11.13.22 @ 11:41) >  ACC: [id number] EXAM:  XR CHEST PORTABLE URGENT 1V                        PROCEDURE DATE:  11/13/2022    INTERPRETATION:  AP chest.  CLINICAL INDICATION: Hypertension  IMPRESSION: Cardiac silhouette is enlarged. The lungs are grossly clear.   Degenerative changes are noted of the bones.    Impression:   89 y/o white male with hx of HTN. HLD, Paroxysmal AFib, s/p MI in 2018,  s/p AAA Repair with Endovascular Stent Grafting of  an Infrarenal AAA along with Bilateral Common Iliac Artery Stent Grafting about 10yrs. ago, Hemorrhoids, BPH with urinary incontinence, admitted via the ER to [facility] on 11/13/22 with c/o worsening urinary incontinence along with increased urinary frequency since the early AM.  He denied any fevers or chills at that time and in the ER was found to be afebrile and with WBC's WNL.  W/u with U/A with Micro revealed marked pyuria with >50WBC's, >50 RBC's, and LE was positive.  Urine Cx ws obtained and he was begun empirically on Rocephin which was completed on 11/16/22 after Urine  Cx  subseqeuntly grew >100.000 EColi which was sensitive to the Rocephin.  Further w/u with CXR was done and negative for any infiltrates and  Renal Sono was done and reported with bilateral Renal Cysts, a Rt. Renal non-obstructing Calculus, and an enlarged Prostate.  Patient has remained afebrile but over the past few days has been noted to have increased ESR up to 79 on 11/18/22 and increased CRP up to 167.  Also noted with slightly increased WBC's to 10,700  on 11/18/22 and the patient began to c/o pain in his Lt. Hand. He denies any hx of trauma and ? had 1 prior episode of Gout in his foot many yrs. ago.  Patient also noted with elevated BUN/ Creat since admission consistent with CKD.  Noted Uric Acid done 11/18/22 is WNL. Now with Cellulitis/ ? early abscess of the Lt hand - ? etiology?  Today noted again with continued improvement clinically although ESR remains elevated but CRP and Procalcitonin are trending downwards now.  WBC's are decreased and patient remains afebrile.  Blod Cx's remain negative thus far.  Noted XRay of the hand with no fx but some degenerative changes seen.    Suggestions:   Will therefore continue present ab rx with Rocephin pending Cx results and continued clinical response.  Follow-up temps and labs closely.  Maintain Lt. Hand elevated.  Discussed in detail with patient at the bedside.  To receive MOM for constipation now.
    INTERVAL HPI/OVERNIGHT EVENTS:        REVIEW OF SYSTEMS:  CONSTITUTIONAL:  no  complaints    NECK: No pain or stiffnes  RESPIRATORY: No SOB   CARDIOVASCULAR: No chest pain, palpitations, dizziness,   GASTROINTESTINAL: No abdominal pain. No nausea, vomiting,   NEUROLOGICAL: No headaches, no  blurry  vision no  dizziness  SKIN: No itching,   MUSCULOSKELETAL: No pain    MEDICATION:  acetaminophen     Tablet .. 650 milliGRAM(s) Oral every 6 hours PRN  aMIOdarone    Tablet 200 milliGRAM(s) Oral daily  amLODIPine   Tablet 10 milliGRAM(s) Oral daily  aspirin enteric coated 81 milliGRAM(s) Oral daily  atorvastatin 80 milliGRAM(s) Oral at bedtime  cefTRIAXone   IVPB      cefTRIAXone   IVPB 1000 milliGRAM(s) IV Intermittent every 24 hours  doxazosin 4 milliGRAM(s) Oral daily  heparin   Injectable 5000 Unit(s) SubCutaneous every 8 hours  lactobacillus acidophilus 1 Tablet(s) Oral every 12 hours  melatonin 3 milliGRAM(s) Oral at bedtime  metoprolol tartrate 25 milliGRAM(s) Oral every 6 hours  pantoprazole    Tablet 40 milliGRAM(s) Oral before breakfast  senna 2 Tablet(s) Oral at bedtime  sodium chloride 0.45%. 1000 milliLiter(s) IV Continuous <Continuous>    Vital Signs Last 24 Hrs  T(C): 36.3 (22 Nov 2022 04:51), Max: 36.6 (21 Nov 2022 23:07)  T(F): 97.3 (22 Nov 2022 04:51), Max: 97.9 (21 Nov 2022 23:07)  HR: 60 (22 Nov 2022 04:51) (54 - 62)  BP: 168/68 (22 Nov 2022 04:51) (120/62 - 168/68)  BP(mean): --  RR: 18 (22 Nov 2022 04:51) (18 - 18)  SpO2: 99% (22 Nov 2022 04:51) (97% - 99%)    Parameters below as of 21 Nov 2022 21:26  Patient On (Oxygen Delivery Method): room air        PHYSICAL EXAM:  GENERAL: NAD, well-groomed, well-developed  HEENT : Conjuntivae  clear sclerae anicteric  NECK: Supple, No JVD, Normal thyroid  NERVOUS SYSTEM:  Alert oriented x2  no  focal  deficits;   CHEST/LUNG: Clear    HEART: Regular rate and rhythm; No murmurs, rubs, or gallops  ABDOMEN: Soft, Nontender, Nondistended; Bowel sounds present  EXTREMITIES:  no  edema no  tenderness  SKIN: No rashes   LABS:                        11.4   8.18  )-----------( 299      ( 22 Nov 2022 06:00 )             37.0     11-22    143  |  114<H>  |  71<H>  ----------------------------<  99  4.6   |  23  |  2.47<H>    Ca    9.3      22 Nov 2022 06:00    TPro  7.5  /  Alb  2.7<L>  /  TBili  0.3  /  DBili  x   /  AST  27  /  ALT  41  /  AlkPhos  98  11-21        CAPILLARY BLOOD GLUCOSE          RADIOLOGY & ADDITIONAL TESTS:    Imaging reports  Personally Reviewed:  [ ] YES  [ ] NO    Consultant(s) Notes Reviewed:  [x ] YES  [ ] NO    Care Discussed with Consultants/Other Providers [x ] YES  [ ] NO  Problem/Plan - 1:  ·  Problem: Acute UTI.   ·  Plan: continue rocephin. as  per  ID  Problem/Plan - 2:  ·  Problem: Benign prostate hyperplasia.   ·  Plan: cardura.  L  wrist  pain swelling  normal   uric  acid  levels observe  with  AB  a   can  have  gout  with  normal  uric  acid  levels if  no  improvemetn  might  benfit  from  steroid  trial  acute  delirium appears  resolved   L  wrist  xr  degenerative  changes  continue  to  monitor  labs  non  obstructive Rt  renal  stone    Problem/Plan - 3:  ·  Problem: PAF (paroxysmal atrial fibrillation).   ·  Plan: metoprolol  amiodarone.    Problem/Plan - 4:  ·  Problem: Hypertension.   ·  Plan: norvasc     Problem/Plan - 5:  ·  Problem: Unsteady gait.   ·  Plan: PT  eval.  CKD  nephrology  eval  appreciated    hx  CAD  elevated  troponin   unclear  significance  in  setting  of  decreased  GFR  and  no  cardiac  symptoms trending  downward  repeat   EKG no  changes  (RBBB old )  TTE reviewed  no  change  from  prior   studies  BUN/CREAT  stable  can be  discharged to rehab to  complete AB  course  as per  ID
    INTERVAL HPI/OVERNIGHT EVENTS:        REVIEW OF SYSTEMS:  CONSTITUTIONAL:  no  complaints    NECK: No pain or stiffnes  RESPIRATORY: No SOB   CARDIOVASCULAR: No chest pain, palpitations, dizziness,   GASTROINTESTINAL: No abdominal pain. No nausea, vomiting,   NEUROLOGICAL: No headaches, no  blurry  vision no  dizziness  SKIN: No itching,   MUSCULOSKELETAL: No pain    MEDICATION:  acetaminophen     Tablet .. 650 milliGRAM(s) Oral every 6 hours PRN  aMIOdarone    Tablet 200 milliGRAM(s) Oral daily  amLODIPine   Tablet 10 milliGRAM(s) Oral daily  aspirin enteric coated 81 milliGRAM(s) Oral daily  atorvastatin 80 milliGRAM(s) Oral at bedtime  cefTRIAXone   IVPB      cefTRIAXone   IVPB 1000 milliGRAM(s) IV Intermittent every 24 hours  doxazosin 4 milliGRAM(s) Oral daily  heparin   Injectable 5000 Unit(s) SubCutaneous every 8 hours  lactobacillus acidophilus 1 Tablet(s) Oral every 12 hours  melatonin 3 milliGRAM(s) Oral at bedtime  metoprolol tartrate 25 milliGRAM(s) Oral every 6 hours  pantoprazole    Tablet 40 milliGRAM(s) Oral before breakfast  senna 2 Tablet(s) Oral at bedtime  sodium chloride 0.45%. 1000 milliLiter(s) IV Continuous <Continuous>    Vital Signs Last 24 Hrs  T(C): 37 (20 Nov 2022 05:07), Max: 37 (20 Nov 2022 05:07)  T(F): 98.6 (20 Nov 2022 05:07), Max: 98.6 (20 Nov 2022 05:07)  HR: 57 (20 Nov 2022 05:07) (57 - 67)  BP: 128/69 (20 Nov 2022 05:07) (124/62 - 149/71)  BP(mean): --  RR: 18 (20 Nov 2022 05:07) (17 - 18)  SpO2: 97% (20 Nov 2022 05:07) (97% - 100%)    Parameters below as of 19 Nov 2022 23:02  Patient On (Oxygen Delivery Method): room air        PHYSICAL EXAM:  GENERAL: NAD, well-groomed, well-developed  HEENT : Conjuntivae  clear sclerae anicteric  NECK: Supple, No JVD, Normal thyroid  NERVOUS SYSTEM:  Alert oriented   no  focal  deficits;   CHEST/LUNG: Clear    HEART: Regular rate and rhythm; No murmurs, rubs, or gallops  ABDOMEN: Soft, Nontender, Nondistended; Bowel sounds present  EXTREMITIES:  mild  diffuse   tenderness  dorsal  aspect  l wrist  SKIN: No rashes   LABS:                        10.7   7.87  )-----------( 260      ( 20 Nov 2022 07:10 )             34.8     11-20    142  |  114<H>  |  76<H>  ----------------------------<  145<H>  4.3   |  21<L>  |  2.78<H>    Ca    9.1      20 Nov 2022 07:10    TPro  7.1  /  Alb  2.3<L>  /  TBili  0.2  /  DBili  x   /  AST  40<H>  /  ALT  40  /  AlkPhos  104  11-20        CAPILLARY BLOOD GLUCOSE          RADIOLOGY & ADDITIONAL TESTS:    Imaging reports  Personally Reviewed:  [ ] YES  [ ] NO    Consultant(s) Notes Reviewed:  [x ] YES  [ ] NO    Care Discussed with Consultants/Other Providers [ x] YES  [ ] NO  Problem/Plan - 1:  ·  Problem: Acute UTI.   ·  Plan: continue rocephin. ID  eval 2/2 worsening  inflammatory  parameters   Problem/Plan - 2:  ·  Problem: Benign prostate hyperplasia.   ·  Plan: cardura.  L  wrist  pain swelling  normal   uric  acid  levels observe  with  AB     can  have  gout  with  normal  uric  acid  levels if  no  improvemetn  might  benfit  from  steroid  trial  acute  delirium appears  resolved   xr  report  still paending  continue  to  monitor  labs  non  obstructive Rt  renal  stone    Problem/Plan - 3:  ·  Problem: PAF (paroxysmal atrial fibrillation).   ·  Plan: metoprolol  amiodarone.    Problem/Plan - 4:  ·  Problem: Hypertension.   ·  Plan: norvasc     Problem/Plan - 5:  ·  Problem: Unsteady gait.   ·  Plan: PT  eval.  CKD  nephrology  eval  appreciated    hx  CAD  elevated  troponin   unclear  significance  in  setting  of  decreased  GFR  and  no  cardiac  symptoms trending  downward  repeat   EKG no  changes  (RBBB old )  will  check  TTE  befoire  discharge

## 2022-11-22 NOTE — PROGRESS NOTE ADULT - PROVIDER SPECIALTY LIST ADULT
Infectious Disease
Internal Medicine
Nephrology
Internal Medicine
Internal Medicine
Nephrology
Nephrology
Internal Medicine
Nephrology
Internal Medicine

## 2022-11-22 NOTE — DISCHARGE NOTE NURSING/CASE MANAGEMENT/SOCIAL WORK - NSDCPEFALRISK_GEN_ALL_CORE
For information on Fall & Injury Prevention, visit: [email or web address] OR  [email or web address] OR  https://www.cdc.gov/steadi/patient.html

## 2022-11-22 NOTE — PROGRESS NOTE ADULT - REASON FOR ADMISSION
uti urinary  retention
uti wekaness
uti
uti weakness hypertension
uti wekness
uti wekness
uti weakness
uti wekness htn troponin  elevation

## 2022-11-22 NOTE — DISCHARGE NOTE PROVIDER - NPI NUMBER (FOR SYSADMIN USE ONLY) :
02/19/21 0700   Pain Assessment   Pain Assessment Tool Pain Assessment not indicated - pt denies pain   Restrictions/Precautions   Precautions Aspiration;Cognitive; Fall Risk   Weight Bearing Restrictions No   ROM Restrictions No   Eating   Type of Assistance Needed Set-up / clean-up   Eating CARE Score 5   Eating Assessment   Findings drank nectar diet pepsi during session   Grooming   Able To Initiate Tasks;Comb/Brush Hair;Wash/Dry Face   Limitation Noted In Coordination; Safety   Shower/Bathe Self   Type of Assistance Needed Supervision; Adaptive equipment   Amount of Physical Assistance Provided No physical assistance   Shower/Bathe Self CARE Score 4   Bathing   Assessed Bath Style Shower   Anticipated D/C Bath Style Shower;Sponge Bath   Able to Chris Brendon No   Able to [employer] Temperature No   Able to Wash/Rinse/Dry (body part) Left Arm;Right Arm;L Upper Leg;R Upper Leg;L Lower Leg/Foot;R Lower Leg/Foot;Chest;Abdomen;Perineal Area; Buttocks   Limitations Noted in Balance; Coordination;ROM;Safety;Strength   Positioning Seated;Standing   Adaptive Equipment Longhand Sponge; Tub Bench; Shower [employer]   Limitations Noted In Balance;Problem Solving;ROM;Safety;LE Strength   Adaptive Equipment Grab Bars;Seat with out Back   Assessed Tub/shower combo   Findings CG   Upper Body Dressing   Type of Assistance Needed Set-up / clean-up   Amount of Physical Assistance Provided No physical assistance   Upper Body Dressing CARE Score 5   Lower Body Dressing   Type of Assistance Needed Supervision; Adaptive equipment   Amount of Physical Assistance Provided No physical assistance   Lower Body Dressing CARE Score 4   Putting On/Taking Off Footwear   Type of Assistance Needed Supervision; Adaptive equipment   Amount of Physical Assistance Provided No physical assistance   Putting On/Taking Off Footwear CARE Score 4   Dressing/Undressing Clothing   Remove UB Clothes Pullover Shirt   Luci Stabs UB Clothes Pullover Shirt   Remove LB Clothes Pants; Undergarment;Socks   Don LB Clothes Pants; Undergarment;Socks   Limitations Noted In Balance; Coordination; Safety;Strength;ROM   Adaptive Equipment Reacher;Sock Aide   Positioning Supported Sit   Lying to Sitting on Side of Bed   Type of Assistance Needed Independent   Amount of Physical Assistance Provided No physical assistance   Lying to Sitting on Side of Bed CARE Score 6   Sit to Stand   Type of Assistance Needed Supervision   Amount of Physical Assistance Provided No physical assistance   Sit to Stand CARE Score 4   Toileting Hygiene   Type of Assistance Needed Supervision   Amount of Physical Assistance Provided No physical assistance   Toileting Hygiene CARE Score 4   Toileting   Able to [address] down yes, up yes  Able to Manage Clothing Closures Yes   Manage Hygiene Bladder   Limitations Noted In Balance; Coordination;ROM;Safety;LE Strength   Adaptive Equipment Grab Bar   Toilet Transfer   Type of Assistance Needed Supervision   Amount of Physical Assistance Provided No physical assistance   Toilet Transfer CARE Score 4   Toilet Transfer   Surface Assessed Standard Toilet   Transfer Technique Stand Pivot   Limitations Noted In Balance;ROM;Safety;LE Strength   Adaptive Equipment Grab Bar   Functional Standing Tolerance   Time x2 trials during grocery shopping, first 2m30s and second 4m   Therapeutic Exercise - ROM   UE-ROM Yes   ROM- Right Upper Extremities   RUE ROM Comment pushed weighted wooden box x20 all planes of motion   ROM - Left Upper Extremities    LUE ROM Comment pushed weighted wooden box x20 all planes of motion   Cognition   Overall Cognitive Status Impaired   Arousal/Participation Alert; Responsive; Cooperative   Attention Attends with cues to redirect   Orientation Level Oriented X4   Memory Decreased recall of precautions   Following Commands Follows one step commands without difficulty   Additional Activities   Additional Activities Comments discussed and demonstrated how to online order groceries via an iPhone; pt reported having ordered online before but never with groceries; pt verbalized understanding and reported son will be available to assist   Assessment   Treatment Assessment Pt agreeable to OT session this AM  Received lying supine in bed  ADL session completed; current LOF and details listed in respective sections  Pt is overall supervision for ADL tasks with AE as needed for LB tasks  Pt continues with decreased balance, impaired coordination on R side, and decreased safety awareness that impacts independence in ADL completion and transfer ability  Functional mobility to and from bathroom using RW was supervision/CG with occasional running into objects on either side of hallway  After ADL, completed ROM, standing tolerance, and grocery shopping activities; pt verbalizes understanding of online grocery ordering and reported son will be available to help  Set up required for breakfast tray at end of session  Pt is set to d/c home tomorrow with family assist and [facility]  Prognosis Good   Problem List Decreased strength;Decreased range of motion;Decreased endurance; Impaired balance;Decreased coordination;Decreased cognition; Impaired judgement;Decreased safety awareness   Plan   Treatment/Interventions ADL retraining;Functional transfer training;LE strengthening/ROM; Therapeutic exercise; Endurance training;Cognitive reorientation;Patient/family training;Equipment eval/education; Compensatory technique education;OT   Progress Progressing toward goals   OT Therapy Minutes   OT Time In 0700   OT Time Out 0820   OT Total Time (minutes) 80   OT Mode of treatment - Individual (minutes) 80   Therapy Time missed   Time missed?  No [[id number]]

## 2022-11-22 NOTE — DISCHARGE NOTE NURSING/CASE MANAGEMENT/SOCIAL WORK - PATIENT PORTAL LINK FT
You can access the FollowMyHealth Patient Portal offered by [facility] by registering at the following website: [email or web address]. By joining [employer]’s FollowMyHealth portal, you will also be able to view your health information using other applications (apps) compatible with our system.

## 2022-11-22 NOTE — DISCHARGE NOTE PROVIDER - CARE PROVIDER_API CALL
Varun Lord)  Physician  NPs  [address] [place] [address]  Phone: [phone]  Fax: [phone]  Follow Up Time:

## 2022-11-24 LAB
CULTURE RESULTS: SIGNIFICANT CHANGE UP
CULTURE RESULTS: SIGNIFICANT CHANGE UP
SPECIMEN SOURCE: SIGNIFICANT CHANGE UP
SPECIMEN SOURCE: SIGNIFICANT CHANGE UP

## 2022-11-28 DIAGNOSIS — R32 UNSPECIFIED URINARY INCONTINENCE: ICD-10-CM

## 2022-11-28 DIAGNOSIS — N18.9 CHRONIC KIDNEY DISEASE, UNSPECIFIED: ICD-10-CM

## 2022-11-28 DIAGNOSIS — N17.9 ACUTE KIDNEY FAILURE, UNSPECIFIED: ICD-10-CM

## 2022-11-28 DIAGNOSIS — L03.114 CELLULITIS OF LEFT UPPER LIMB: ICD-10-CM

## 2022-11-28 DIAGNOSIS — E78.5 HYPERLIPIDEMIA, UNSPECIFIED: ICD-10-CM

## 2022-11-28 DIAGNOSIS — B96.20 UNSPECIFIED ESCHERICHIA COLI [E. COLI] AS THE CAUSE OF DISEASES CLASSIFIED ELSEWHERE: ICD-10-CM

## 2022-11-28 DIAGNOSIS — F05 DELIRIUM DUE TO KNOWN PHYSIOLOGICAL CONDITION: ICD-10-CM

## 2022-11-28 DIAGNOSIS — K59.00 CONSTIPATION, UNSPECIFIED: ICD-10-CM

## 2022-11-28 DIAGNOSIS — I48.0 PAROXYSMAL ATRIAL FIBRILLATION: ICD-10-CM

## 2022-11-28 DIAGNOSIS — N40.1 BENIGN PROSTATIC HYPERPLASIA WITH LOWER URINARY TRACT SYMPTOMS: ICD-10-CM

## 2022-11-28 DIAGNOSIS — R26.81 UNSTEADINESS ON FEET: ICD-10-CM

## 2022-11-28 DIAGNOSIS — I25.10 ATHEROSCLEROTIC HEART DISEASE OF NATIVE CORONARY ARTERY WITHOUT ANGINA PECTORIS: ICD-10-CM

## 2022-11-28 DIAGNOSIS — E43 UNSPECIFIED SEVERE PROTEIN-CALORIE MALNUTRITION: ICD-10-CM

## 2022-11-28 DIAGNOSIS — Z79.82 LONG TERM (CURRENT) USE OF ASPIRIN: ICD-10-CM

## 2022-11-28 DIAGNOSIS — N28.1 CYST OF KIDNEY, ACQUIRED: ICD-10-CM

## 2022-11-28 DIAGNOSIS — I21.A1 MYOCARDIAL INFARCTION TYPE 2: ICD-10-CM

## 2022-11-28 DIAGNOSIS — Z95.1 PRESENCE OF AORTOCORONARY BYPASS GRAFT: ICD-10-CM

## 2022-11-28 DIAGNOSIS — N20.0 CALCULUS OF KIDNEY: ICD-10-CM

## 2022-11-28 DIAGNOSIS — N39.0 URINARY TRACT INFECTION, SITE NOT SPECIFIED: ICD-10-CM

## 2022-11-28 DIAGNOSIS — R53.1 WEAKNESS: ICD-10-CM

## 2022-11-28 DIAGNOSIS — I12.9 HYPERTENSIVE CHRONIC KIDNEY DISEASE WITH STAGE 1 THROUGH STAGE 4 CHRONIC KIDNEY DISEASE, OR UNSPECIFIED CHRONIC KIDNEY DISEASE: ICD-10-CM
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
## 2023-05-15 NOTE — PATIENT PROFILE ADULT - BILL PAYMENT
Aklief counseling:  Patient advised to apply a pea-sized amount only at bedtime and wait 30 minutes after washing their face before applying.  If too drying, patient may add a non-comedogenic moisturizer.  The most commonly reported side effects including irritation, redness, scaling, dryness, stinging, burning, itching, and increased risk of sunburn.  The patient verbalized understanding of the proper use and possible adverse effects of retinoids.  All of the patient's questions and concerns were addressed. no

## 2024-02-29 NOTE — PHYSICAL THERAPY INITIAL EVALUATION ADULT - NS ASR RISK AREAS PT EVAL
abdomen and pelvis.      5.  No bowel obstruction.             Nik Jamil MD  02/29/24 1:32 AM           fall

## 2024-10-29 NOTE — PATIENT PROFILE ADULT. - VISION (WITH CORRECTIVE LENSES IF THE PATIENT USUALLY WEARS THEM):
Normal vision: sees adequately in most situations; can see medication labels, newsprint Detail Level: Zone Initiate Treatment: Fluorouracil 5% cream twice daily for four weeks